# Patient Record
Sex: FEMALE | Race: WHITE | NOT HISPANIC OR LATINO | Employment: FULL TIME | URBAN - METROPOLITAN AREA
[De-identification: names, ages, dates, MRNs, and addresses within clinical notes are randomized per-mention and may not be internally consistent; named-entity substitution may affect disease eponyms.]

---

## 2017-09-07 RX ORDER — MEDROXYPROGESTERONE ACETATE 150 MG/ML
INJECTION, SUSPENSION INTRAMUSCULAR
Qty: 1 ML | Refills: 0 | OUTPATIENT
Start: 2017-09-07

## 2024-03-19 ENCOUNTER — HOSPITAL ENCOUNTER (EMERGENCY)
Facility: HOSPITAL | Age: 39
Discharge: HOME | End: 2024-03-19
Attending: STUDENT IN AN ORGANIZED HEALTH CARE EDUCATION/TRAINING PROGRAM | Admitting: STUDENT IN AN ORGANIZED HEALTH CARE EDUCATION/TRAINING PROGRAM
Payer: COMMERCIAL

## 2024-03-19 ENCOUNTER — APPOINTMENT (OUTPATIENT)
Dept: CT IMAGING | Facility: HOSPITAL | Age: 39
End: 2024-03-19
Payer: COMMERCIAL

## 2024-03-19 VITALS
TEMPERATURE: 97.9 F | SYSTOLIC BLOOD PRESSURE: 128 MMHG | OXYGEN SATURATION: 97 % | BODY MASS INDEX: 36.87 KG/M2 | HEART RATE: 72 BPM | DIASTOLIC BLOOD PRESSURE: 79 MMHG | RESPIRATION RATE: 18 BRPM | HEIGHT: 62 IN

## 2024-03-19 DIAGNOSIS — K29.20 ACUTE ALCOHOLIC GASTRITIS WITHOUT HEMORRHAGE: Primary | ICD-10-CM

## 2024-03-19 LAB
ALBUMIN SERPL-MCNC: 4.2 G/DL (ref 3.5–5.2)
ALBUMIN/GLOB SERPL: 1.8 G/DL
ALP SERPL-CCNC: 98 U/L (ref 39–117)
ALT SERPL W P-5'-P-CCNC: 24 U/L (ref 1–33)
ANION GAP SERPL CALCULATED.3IONS-SCNC: 11 MMOL/L (ref 5–15)
AST SERPL-CCNC: 14 U/L (ref 1–32)
BACTERIA UR QL AUTO: NORMAL /HPF
BASOPHILS # BLD AUTO: 0.02 10*3/MM3 (ref 0–0.2)
BASOPHILS NFR BLD AUTO: 0.3 % (ref 0–1.5)
BILIRUB SERPL-MCNC: 0.3 MG/DL (ref 0–1.2)
BILIRUB UR QL STRIP: NEGATIVE
BUN SERPL-MCNC: 9 MG/DL (ref 6–20)
BUN/CREAT SERPL: 10 (ref 7–25)
CALCIUM SPEC-SCNC: 9.2 MG/DL (ref 8.6–10.5)
CHLORIDE SERPL-SCNC: 106 MMOL/L (ref 98–107)
CLARITY UR: CLEAR
CO2 SERPL-SCNC: 21 MMOL/L (ref 22–29)
COLOR UR: YELLOW
CREAT SERPL-MCNC: 0.9 MG/DL (ref 0.57–1)
DEPRECATED RDW RBC AUTO: 41.3 FL (ref 37–54)
EGFRCR SERPLBLD CKD-EPI 2021: 82.5 ML/MIN/1.73
EOSINOPHIL # BLD AUTO: 0.1 10*3/MM3 (ref 0–0.4)
EOSINOPHIL NFR BLD AUTO: 1.6 % (ref 0.3–6.2)
ERYTHROCYTE [DISTWIDTH] IN BLOOD BY AUTOMATED COUNT: 11.6 % (ref 12.3–15.4)
GLOBULIN UR ELPH-MCNC: 2.3 GM/DL
GLUCOSE SERPL-MCNC: 120 MG/DL (ref 65–99)
GLUCOSE UR STRIP-MCNC: NEGATIVE MG/DL
HCG SERPL QL: NEGATIVE
HCT VFR BLD AUTO: 44.1 % (ref 34–46.6)
HGB BLD-MCNC: 14.5 G/DL (ref 12–15.9)
HGB UR QL STRIP.AUTO: NEGATIVE
HYALINE CASTS UR QL AUTO: NORMAL /LPF
IMM GRANULOCYTES # BLD AUTO: 0.01 10*3/MM3 (ref 0–0.05)
IMM GRANULOCYTES NFR BLD AUTO: 0.2 % (ref 0–0.5)
KETONES UR QL STRIP: NEGATIVE
LEUKOCYTE ESTERASE UR QL STRIP.AUTO: ABNORMAL
LIPASE SERPL-CCNC: 27 U/L (ref 13–60)
LYMPHOCYTES # BLD AUTO: 1.88 10*3/MM3 (ref 0.7–3.1)
LYMPHOCYTES NFR BLD AUTO: 30.2 % (ref 19.6–45.3)
MCH RBC QN AUTO: 31.8 PG (ref 26.6–33)
MCHC RBC AUTO-ENTMCNC: 32.9 G/DL (ref 31.5–35.7)
MCV RBC AUTO: 96.7 FL (ref 79–97)
MONOCYTES # BLD AUTO: 0.45 10*3/MM3 (ref 0.1–0.9)
MONOCYTES NFR BLD AUTO: 7.2 % (ref 5–12)
NEUTROPHILS NFR BLD AUTO: 3.76 10*3/MM3 (ref 1.7–7)
NEUTROPHILS NFR BLD AUTO: 60.5 % (ref 42.7–76)
NITRITE UR QL STRIP: NEGATIVE
NRBC BLD AUTO-RTO: 0 /100 WBC (ref 0–0.2)
PH UR STRIP.AUTO: 6.5 [PH] (ref 5–8)
PLATELET # BLD AUTO: 338 10*3/MM3 (ref 140–450)
PMV BLD AUTO: 9.2 FL (ref 6–12)
POTASSIUM SERPL-SCNC: 4.2 MMOL/L (ref 3.5–5.2)
PROT SERPL-MCNC: 6.5 G/DL (ref 6–8.5)
PROT UR QL STRIP: NEGATIVE
RBC # BLD AUTO: 4.56 10*6/MM3 (ref 3.77–5.28)
RBC # UR STRIP: NORMAL /HPF
REF LAB TEST METHOD: NORMAL
SODIUM SERPL-SCNC: 138 MMOL/L (ref 136–145)
SP GR UR STRIP: 1.02 (ref 1–1.03)
SQUAMOUS #/AREA URNS HPF: NORMAL /HPF
UROBILINOGEN UR QL STRIP: ABNORMAL
WBC # UR STRIP: NORMAL /HPF
WBC NRBC COR # BLD AUTO: 6.22 10*3/MM3 (ref 3.4–10.8)

## 2024-03-19 PROCEDURE — 25010000002 MORPHINE PER 10 MG: Performed by: STUDENT IN AN ORGANIZED HEALTH CARE EDUCATION/TRAINING PROGRAM

## 2024-03-19 PROCEDURE — 80053 COMPREHEN METABOLIC PANEL: CPT | Performed by: STUDENT IN AN ORGANIZED HEALTH CARE EDUCATION/TRAINING PROGRAM

## 2024-03-19 PROCEDURE — 96374 THER/PROPH/DIAG INJ IV PUSH: CPT

## 2024-03-19 PROCEDURE — 96375 TX/PRO/DX INJ NEW DRUG ADDON: CPT

## 2024-03-19 PROCEDURE — 85025 COMPLETE CBC W/AUTO DIFF WBC: CPT | Performed by: STUDENT IN AN ORGANIZED HEALTH CARE EDUCATION/TRAINING PROGRAM

## 2024-03-19 PROCEDURE — 74177 CT ABD & PELVIS W/CONTRAST: CPT

## 2024-03-19 PROCEDURE — 25010000002 ONDANSETRON PER 1 MG: Performed by: STUDENT IN AN ORGANIZED HEALTH CARE EDUCATION/TRAINING PROGRAM

## 2024-03-19 PROCEDURE — 81001 URINALYSIS AUTO W/SCOPE: CPT | Performed by: STUDENT IN AN ORGANIZED HEALTH CARE EDUCATION/TRAINING PROGRAM

## 2024-03-19 PROCEDURE — 99285 EMERGENCY DEPT VISIT HI MDM: CPT

## 2024-03-19 PROCEDURE — 25510000001 IOPAMIDOL 61 % SOLUTION: Performed by: STUDENT IN AN ORGANIZED HEALTH CARE EDUCATION/TRAINING PROGRAM

## 2024-03-19 PROCEDURE — 83690 ASSAY OF LIPASE: CPT | Performed by: STUDENT IN AN ORGANIZED HEALTH CARE EDUCATION/TRAINING PROGRAM

## 2024-03-19 PROCEDURE — 25810000003 SODIUM CHLORIDE 0.9 % SOLUTION: Performed by: STUDENT IN AN ORGANIZED HEALTH CARE EDUCATION/TRAINING PROGRAM

## 2024-03-19 PROCEDURE — 84703 CHORIONIC GONADOTROPIN ASSAY: CPT | Performed by: STUDENT IN AN ORGANIZED HEALTH CARE EDUCATION/TRAINING PROGRAM

## 2024-03-19 RX ORDER — ONDANSETRON 2 MG/ML
4 INJECTION INTRAMUSCULAR; INTRAVENOUS ONCE
Status: COMPLETED | OUTPATIENT
Start: 2024-03-19 | End: 2024-03-19

## 2024-03-19 RX ORDER — OMEPRAZOLE 20 MG/1
40 CAPSULE, DELAYED RELEASE ORAL DAILY
Qty: 60 CAPSULE | Refills: 0 | Status: SHIPPED | OUTPATIENT
Start: 2024-03-19 | End: 2024-04-18

## 2024-03-19 RX ORDER — MORPHINE SULFATE 2 MG/ML
4 INJECTION, SOLUTION INTRAMUSCULAR; INTRAVENOUS ONCE
Status: COMPLETED | OUTPATIENT
Start: 2024-03-19 | End: 2024-03-19

## 2024-03-19 RX ORDER — PANTOPRAZOLE SODIUM 40 MG/10ML
40 INJECTION, POWDER, LYOPHILIZED, FOR SOLUTION INTRAVENOUS ONCE
Status: COMPLETED | OUTPATIENT
Start: 2024-03-19 | End: 2024-03-19

## 2024-03-19 RX ORDER — SUCRALFATE 1 G/1
1 TABLET ORAL 4 TIMES DAILY
Qty: 120 TABLET | Refills: 0 | Status: SHIPPED | OUTPATIENT
Start: 2024-03-19 | End: 2024-04-18

## 2024-03-19 RX ORDER — ALUMINA, MAGNESIA, AND SIMETHICONE 2400; 2400; 240 MG/30ML; MG/30ML; MG/30ML
15 SUSPENSION ORAL ONCE
Status: COMPLETED | OUTPATIENT
Start: 2024-03-19 | End: 2024-03-19

## 2024-03-19 RX ADMIN — PANTOPRAZOLE SODIUM 40 MG: 40 INJECTION, POWDER, FOR SOLUTION INTRAVENOUS at 12:16

## 2024-03-19 RX ADMIN — IOPAMIDOL 85 ML: 612 INJECTION, SOLUTION INTRAVENOUS at 11:03

## 2024-03-19 RX ADMIN — MORPHINE SULFATE 4 MG: 2 INJECTION, SOLUTION INTRAMUSCULAR; INTRAVENOUS at 10:20

## 2024-03-19 RX ADMIN — SODIUM CHLORIDE 1000 ML: 9 INJECTION, SOLUTION INTRAVENOUS at 10:19

## 2024-03-19 RX ADMIN — ALUMINUM HYDROXIDE, MAGNESIUM HYDROXIDE, AND DIMETHICONE 15 ML: 400; 400; 40 SUSPENSION ORAL at 12:16

## 2024-03-19 RX ADMIN — ONDANSETRON 4 MG: 2 INJECTION INTRAMUSCULAR; INTRAVENOUS at 10:20

## 2024-03-19 NOTE — DISCHARGE INSTRUCTIONS
Please take all medication as prescribed.  Please decrease your alcohol use    You may get a bottle of Maalox over-the-counter and follow directions on the packaging to help relieve your symptoms as well    Please return to the ER with new or worsening symptoms including but not limited to uncontrolled pain, uncontrolled nausea or vomiting, fevers, chills    Please adhere to a bland diet over the next 1 to 2 weeks    Follow-up with your primary care physician within 1 week for repeat evaluation    I have placed a referral order to [facility] neurology, they will contact you to schedule an outpatient appointment

## 2024-03-19 NOTE — ED PROVIDER NOTES
EMERGENCY DEPARTMENT ENCOUNTER  Room Number:  38/38  PCP: Sonja Broussard MD  Independent Historians: Patient      HPI:  Chief Complaint: had concerns including Abdominal Pain and Back Pain.     Context: Magdalena Carreon is a 39 y.o. female with a medical history of cholecystectomy, alcohol use, seizures who presents to the ED c/o acute abdominal pain.  Patient states over the last 24 hours she has been having significant epigastric pain and associated nausea.  Patient denies vomiting, changes in bowel habits.  Patient states she is a daily alcohol user and has upwards of 3 alcoholic drinks per day.  Patient states her pain is located in the epigastrium and radiates to her back.  Patient does have history of previous cholecystectomy.      Review of prior external notes (non-ED) -and- Review of prior external test results outside of this encounter: Extensive review of the EPIC system as well as [facility] reveals no prior visit notes and no prior diagnostic studies available for review.    PAST MEDICAL HISTORY  Active Ambulatory Problems     Diagnosis Date Noted    No Active Ambulatory Problems     Resolved Ambulatory Problems     Diagnosis Date Noted    No Resolved Ambulatory Problems     Past Medical History:   Diagnosis Date    Abnormal Pap smear of cervix     Migraine     Seizure disorder          PAST SURGICAL HISTORY  Past Surgical History:   Procedure Laterality Date    CEREBRAL ANEURYSM REPAIR           FAMILY HISTORY  Family History   Problem Relation Age of Onset    Melanoma Father     Diabetes Father     Pancreatic cancer Paternal Grandmother     Diabetes Paternal Grandmother     Diabetes Maternal Grandmother          SOCIAL HISTORY  Social History     Socioeconomic History    Marital status: Single   Tobacco Use    Smoking status: Light Smoker   Substance and Sexual Activity    Drug use: Yes     Types: Marijuana    Sexual activity: Yes         ALLERGIES  Prednisone      REVIEW OF SYSTEMS  Review of  Systems  Included in HPI  All systems reviewed and negative except for those discussed in HPI.      PHYSICAL EXAM    I have reviewed the triage vital signs and nursing notes.    ED Triage Vitals [03/19/24 0912]   Temp Heart Rate Resp BP SpO2   97.9 °F (36.6 °C) 118 18 -- 99 %      Temp src Heart Rate Source Patient Position BP Location FiO2 (%)   Tympanic Monitor -- -- --       Physical Exam  GENERAL: alert, no acute distress  SKIN: Warm, dry  HENT: Normocephalic, atraumatic  EYES: no scleral icterus  CV: regular rhythm, regular rate  RESPIRATORY: normal effort, lungs clear  ABDOMEN: soft, tenderness to palpation in the epigastrium, soft, nondistended  MUSCULOSKELETAL: no deformity  NEURO: alert, moves all extremities, follows commands            LAB RESULTS  Recent Results (from the past 24 hour(s))   Comprehensive Metabolic Panel    Collection Time: 03/19/24  9:24 AM    Specimen: Blood   Result Value Ref Range    Glucose 120 (H) 65 - 99 mg/dL    BUN 9 6 - 20 mg/dL    Creatinine 0.90 0.57 - 1.00 mg/dL    Sodium 138 136 - 145 mmol/L    Potassium 4.2 3.5 - 5.2 mmol/L    Chloride 106 98 - 107 mmol/L    CO2 21.0 (L) 22.0 - 29.0 mmol/L    Calcium 9.2 8.6 - 10.5 mg/dL    Total Protein 6.5 6.0 - 8.5 g/dL    Albumin 4.2 3.5 - 5.2 g/dL    ALT (SGPT) 24 1 - 33 U/L    AST (SGOT) 14 1 - 32 U/L    Alkaline Phosphatase 98 39 - 117 U/L    Total Bilirubin 0.3 0.0 - 1.2 mg/dL    Globulin 2.3 gm/dL    A/G Ratio 1.8 g/dL    BUN/Creatinine Ratio 10.0 7.0 - 25.0    Anion Gap 11.0 5.0 - 15.0 mmol/L    eGFR 82.5 >60.0 mL/min/1.73   Lipase    Collection Time: 03/19/24  9:24 AM    Specimen: Blood   Result Value Ref Range    Lipase 27 13 - 60 U/L   hCG, Serum, Qualitative    Collection Time: 03/19/24  9:24 AM    Specimen: Blood   Result Value Ref Range    HCG Qualitative Negative Negative   CBC Auto Differential    Collection Time: 03/19/24  9:24 AM    Specimen: Blood   Result Value Ref Range    WBC 6.22 3.40 - 10.80 10*3/mm3    RBC 4.56  3.77 - 5.28 10*6/mm3    Hemoglobin 14.5 12.0 - 15.9 g/dL    Hematocrit 44.1 34.0 - 46.6 %    MCV 96.7 79.0 - 97.0 fL    MCH 31.8 26.6 - 33.0 pg    MCHC 32.9 31.5 - 35.7 g/dL    RDW 11.6 (L) 12.3 - 15.4 %    RDW-SD 41.3 37.0 - 54.0 fl    MPV 9.2 6.0 - 12.0 fL    Platelets 338 140 - 450 10*3/mm3    Neutrophil % 60.5 42.7 - 76.0 %    Lymphocyte % 30.2 19.6 - 45.3 %    Monocyte % 7.2 5.0 - 12.0 %    Eosinophil % 1.6 0.3 - 6.2 %    Basophil % 0.3 0.0 - 1.5 %    Immature Grans % 0.2 0.0 - 0.5 %    Neutrophils, Absolute 3.76 1.70 - 7.00 10*3/mm3    Lymphocytes, Absolute 1.88 0.70 - 3.10 10*3/mm3    Monocytes, Absolute 0.45 0.10 - 0.90 10*3/mm3    Eosinophils, Absolute 0.10 0.00 - 0.40 10*3/mm3    Basophils, Absolute 0.02 0.00 - 0.20 10*3/mm3    Immature Grans, Absolute 0.01 0.00 - 0.05 10*3/mm3    nRBC 0.0 0.0 - 0.2 /100 WBC   Urinalysis With Microscopic If Indicated (No Culture) - Urine, Clean Catch    Collection Time: 03/19/24 10:22 AM    Specimen: Urine, Clean Catch   Result Value Ref Range    Color, UA Yellow Yellow, Straw    Appearance, UA Clear Clear    pH, UA 6.5 5.0 - 8.0    Specific Gravity, UA 1.020 1.005 - 1.030    Glucose, UA Negative Negative    Ketones, UA Negative Negative    Bilirubin, UA Negative Negative    Blood, UA Negative Negative    Protein, UA Negative Negative    Leuk Esterase, UA Small (1+) (A) Negative    Nitrite, UA Negative Negative    Urobilinogen, UA 0.2 E.U./dL 0.2 - 1.0 E.U./dL   Urinalysis, Microscopic Only - Urine, Clean Catch    Collection Time: 03/19/24 10:22 AM    Specimen: Urine, Clean Catch   Result Value Ref Range    RBC, UA 0-2 None Seen, 0-2 /HPF    WBC, UA 0-2 None Seen, 0-2 /HPF    Bacteria, UA None Seen None Seen /HPF    Squamous Epithelial Cells, UA 0-2 None Seen, 0-2 /HPF    Hyaline Casts, UA None Seen None Seen /LPF    Methodology Automated Microscopy          RADIOLOGY  CT Abdomen Pelvis With Contrast    Result Date: 3/19/2024  CT ABDOMEN AND PELVIS WITH IV CONTRAST   HISTORY: 39-year-old female with left upper quadrant pain for 2 days.  TECHNIQUE: Radiation dose reduction techniques were utilized, including automated exposure control and exposure modulation based on body size. 3 mm images were obtained through the abdomen and pelvis after the administration of IV contrast. No priors for comparison.  FINDINGS: 1. Noncontrasted liver appears unremarkable and there is no biliary dilatation. Splenic size is normal. The pancreas appears unremarkable. There is nodular hyperplasia of both adrenal glands. Kidneys appear unremarkable other than a small right renal cyst.  2. There is a paucity of formed stool within the colon, but there is no colonic thickening or convincing evidence for colitis. There is no significant colonic diverticulosis. Appendix is within normal limits.  3. Uterus and adnexa appear unremarkable. There is a tubal ligation clip on the right, but there is not one on the left. There is a similar-appearing clip just deep to the right rectus muscle belly, image 121. There is no free fluid or lymphadenopathy.  4. There is no evidence for pneumonia or pleural effusions within the visualized lower chest.         MEDICATIONS GIVEN IN ER  Medications   ondansetron (ZOFRAN) injection 4 mg (4 mg Intravenous Given 3/19/24 1020)   morphine injection 4 mg (4 mg Intravenous Given 3/19/24 1020)   sodium chloride 0.9 % bolus 1,000 mL (1,000 mL Intravenous New Bag 3/19/24 1019)   iopamidol (ISOVUE-300) 61 % injection 100 mL (85 mL Intravenous Given 3/19/24 1103)   aluminum-magnesium hydroxide-simethicone (MAALOX MAX) 400-400-40 MG/5ML suspension 15 mL (15 mL Oral Given 3/19/24 1216)   pantoprazole (PROTONIX) injection 40 mg (40 mg Intravenous Given 3/19/24 1216)         ORDERS PLACED DURING THIS VISIT:  Orders Placed This Encounter   Procedures    CT Abdomen Pelvis With Contrast    Comprehensive Metabolic Panel    Lipase    hCG, Serum, Qualitative    Urinalysis With Microscopic If  Indicated (No Culture) - Urine, Clean Catch    CBC Auto Differential    Urinalysis, Microscopic Only - Urine, Clean Catch    Ambulatory Referral to Gastroenterology    CBC & Differential         OUTPATIENT MEDICATION MANAGEMENT:  No current Epic-ordered facility-administered medications on file.     Current Outpatient Medications Ordered in Epic   Medication Sig Dispense Refill    gabapentin (NEURONTIN) 100 MG capsule Take 100 mg by mouth Daily.      levETIRAcetam (KEPPRA) 1000 MG tablet   0    medroxyPROGESTERone (DEPO-PROVERA) 150 MG/ML injection Inject 1 mL into the shoulder, thigh, or buttocks every 3 (three) months. 1 mL 3    omeprazole (priLOSEC) 20 MG capsule Take 2 capsules by mouth Daily for 30 days. 60 capsule 0    sucralfate (CARAFATE) 1 g tablet Take 1 tablet by mouth 4 (Four) Times a Day for 30 days. 120 tablet 0       PROGRESS, DATA ANALYSIS, CONSULTS, AND MEDICAL DECISION MAKING  All labs have been independently interpreted by me.  All radiology studies have been reviewed by me. All EKG's have been independently viewed and interpreted by me.  Discussion below represents my analysis of pertinent findings related to patient's condition, differential diagnosis, treatment plan and final disposition.    Differential diagnosis includes but is not limited to gastritis, pancreatitis, gastroenteritis.    Clinical Scores:                   ED Course as of 03/19/24 1301   Tue Mar 19, 2024   1200 CT abdomen interpreted by me and demonstrates no evidence of free air or SBO [MW]   1259 Laboratory evaluation is overall unremarkable.  Radiological evaluation demonstrates no acute findings.  I suspect patient is suffering from gastritis secondary to alcohol use.  Administered Maalox and pantoprazole in the emergency department.  Will start patient on a course of omeprazole, Carafate and counseled on use of Maalox.  Placed ambulatory referral to [facility] neurology.  Counseled patient to follow closely with primary care.   Return precautions discussed and patient discharged in stable condition. [MW]      ED Course User Index  [MW] Abiodun Moreno MD             AS OF 13:01 EDT VITALS:    BP - 128/79  HR - 72  TEMP - 97.9 °F (36.6 °C) (Tympanic)  O2 SATS - 97%    COMPLEXITY OF CARE  Admission was considered but after careful review of the patient's presentation, physical examination, diagnostic results, and response to treatment the patient may be safely discharged with outpatient follow-up.      DIAGNOSIS  Final diagnoses:   Acute alcoholic gastritis without hemorrhage         DISPOSITION  ED Disposition       ED Disposition   Discharge    Condition   Stable    Comment   --                Please note that portions of this document were completed with a voice recognition program.    Note Disclaimer: At [facility], we believe that sharing information builds trust and better relationships. You are receiving this note because you recently visited [facility]. It is possible you will see health information before a provider has talked with you about it. This kind of information can be easy to misunderstand. To help you fully understand what it means for your health, we urge you to discuss this note with your provider.         Abiodun Moreno MD  03/19/24 1740

## 2024-03-19 NOTE — ED TRIAGE NOTES
Pt to ed from home via PV    T c/o RUQ pain radiating to back x1 days. Pt states she has had her gallbladder removed. Pt also reports diarrhea

## 2024-05-07 ENCOUNTER — HOSPITAL ENCOUNTER (EMERGENCY)
Facility: HOSPITAL | Age: 39
Discharge: HOME | End: 2024-05-07
Attending: EMERGENCY MEDICINE
Payer: COMMERCIAL

## 2024-05-07 VITALS
WEIGHT: 260 LBS | OXYGEN SATURATION: 98 % | HEART RATE: 72 BPM | DIASTOLIC BLOOD PRESSURE: 69 MMHG | HEIGHT: 62 IN | RESPIRATION RATE: 16 BRPM | BODY MASS INDEX: 47.84 KG/M2 | TEMPERATURE: 99 F | SYSTOLIC BLOOD PRESSURE: 107 MMHG

## 2024-05-07 DIAGNOSIS — G43.809 OTHER MIGRAINE WITHOUT STATUS MIGRAINOSUS, NOT INTRACTABLE: Primary | ICD-10-CM

## 2024-05-07 LAB
ANION GAP SERPL CALCULATED.3IONS-SCNC: 7.8 MMOL/L (ref 5–15)
BASOPHILS # BLD AUTO: 0.02 10*3/MM3 (ref 0–0.2)
BASOPHILS NFR BLD AUTO: 0.3 % (ref 0–1.5)
BUN SERPL-MCNC: 12 MG/DL (ref 6–20)
BUN/CREAT SERPL: 15 (ref 7–25)
CALCIUM SPEC-SCNC: 9.6 MG/DL (ref 8.6–10.5)
CHLORIDE SERPL-SCNC: 106 MMOL/L (ref 98–107)
CO2 SERPL-SCNC: 24.2 MMOL/L (ref 22–29)
CREAT SERPL-MCNC: 0.8 MG/DL (ref 0.57–1)
DEPRECATED RDW RBC AUTO: 47.4 FL (ref 37–54)
EGFRCR SERPLBLD CKD-EPI 2021: 95.1 ML/MIN/1.73
EOSINOPHIL # BLD AUTO: 0.13 10*3/MM3 (ref 0–0.4)
EOSINOPHIL NFR BLD AUTO: 1.6 % (ref 0.3–6.2)
ERYTHROCYTE [DISTWIDTH] IN BLOOD BY AUTOMATED COUNT: 12.6 % (ref 12.3–15.4)
GLUCOSE SERPL-MCNC: 96 MG/DL (ref 65–99)
HCT VFR BLD AUTO: 41.6 % (ref 34–46.6)
HGB BLD-MCNC: 13.5 G/DL (ref 12–15.9)
IMM GRANULOCYTES # BLD AUTO: 0.01 10*3/MM3 (ref 0–0.05)
IMM GRANULOCYTES NFR BLD AUTO: 0.1 % (ref 0–0.5)
LYMPHOCYTES # BLD AUTO: 2.64 10*3/MM3 (ref 0.7–3.1)
LYMPHOCYTES NFR BLD AUTO: 33.2 % (ref 19.6–45.3)
MCH RBC QN AUTO: 33 PG (ref 26.6–33)
MCHC RBC AUTO-ENTMCNC: 32.5 G/DL (ref 31.5–35.7)
MCV RBC AUTO: 101.7 FL (ref 79–97)
MONOCYTES # BLD AUTO: 0.52 10*3/MM3 (ref 0.1–0.9)
MONOCYTES NFR BLD AUTO: 6.5 % (ref 5–12)
NEUTROPHILS NFR BLD AUTO: 4.63 10*3/MM3 (ref 1.7–7)
NEUTROPHILS NFR BLD AUTO: 58.3 % (ref 42.7–76)
PLATELET # BLD AUTO: 331 10*3/MM3 (ref 140–450)
PMV BLD AUTO: 9.5 FL (ref 6–12)
POTASSIUM SERPL-SCNC: 4 MMOL/L (ref 3.5–5.2)
RBC # BLD AUTO: 4.09 10*6/MM3 (ref 3.77–5.28)
SODIUM SERPL-SCNC: 138 MMOL/L (ref 136–145)
WBC NRBC COR # BLD AUTO: 7.95 10*3/MM3 (ref 3.4–10.8)

## 2024-05-07 PROCEDURE — 25010000002 METOCLOPRAMIDE PER 10 MG

## 2024-05-07 PROCEDURE — 99283 EMERGENCY DEPT VISIT LOW MDM: CPT

## 2024-05-07 PROCEDURE — 85025 COMPLETE CBC W/AUTO DIFF WBC: CPT

## 2024-05-07 PROCEDURE — 99284 EMERGENCY DEPT VISIT MOD MDM: CPT

## 2024-05-07 PROCEDURE — 36415 COLL VENOUS BLD VENIPUNCTURE: CPT

## 2024-05-07 PROCEDURE — 96374 THER/PROPH/DIAG INJ IV PUSH: CPT

## 2024-05-07 PROCEDURE — 25010000002 KETOROLAC TROMETHAMINE PER 15 MG

## 2024-05-07 PROCEDURE — 96375 TX/PRO/DX INJ NEW DRUG ADDON: CPT

## 2024-05-07 PROCEDURE — 80048 BASIC METABOLIC PNL TOTAL CA: CPT

## 2024-05-07 RX ORDER — METOCLOPRAMIDE HYDROCHLORIDE 5 MG/ML
20 INJECTION INTRAMUSCULAR; INTRAVENOUS ONCE
Status: DISCONTINUED | OUTPATIENT
Start: 2024-05-07 | End: 2024-05-07

## 2024-05-07 RX ORDER — ONDANSETRON 4 MG/1
8 TABLET, ORALLY DISINTEGRATING ORAL EVERY 8 HOURS PRN
Qty: 12 TABLET | Refills: 0 | Status: SHIPPED | OUTPATIENT
Start: 2024-05-07

## 2024-05-07 RX ORDER — ACETAMINOPHEN 500 MG
1000 TABLET ORAL ONCE
Status: COMPLETED | OUTPATIENT
Start: 2024-05-07 | End: 2024-05-07

## 2024-05-07 RX ORDER — KETOROLAC TROMETHAMINE 30 MG/ML
30 INJECTION, SOLUTION INTRAMUSCULAR; INTRAVENOUS ONCE
Status: COMPLETED | OUTPATIENT
Start: 2024-05-07 | End: 2024-05-07

## 2024-05-07 RX ORDER — METOCLOPRAMIDE HYDROCHLORIDE 5 MG/ML
10 INJECTION INTRAMUSCULAR; INTRAVENOUS ONCE
Status: COMPLETED | OUTPATIENT
Start: 2024-05-07 | End: 2024-05-07

## 2024-05-07 RX ORDER — PROMETHAZINE HYDROCHLORIDE 25 MG/1
25 TABLET ORAL EVERY 6 HOURS PRN
Qty: 12 TABLET | Refills: 0 | Status: SHIPPED | OUTPATIENT
Start: 2024-05-07

## 2024-05-07 RX ADMIN — ACETAMINOPHEN 1000 MG: 500 TABLET ORAL at 16:24

## 2024-05-07 RX ADMIN — KETOROLAC TROMETHAMINE 30 MG: 30 INJECTION, SOLUTION INTRAMUSCULAR at 15:38

## 2024-05-07 RX ADMIN — METOCLOPRAMIDE 10 MG: 5 INJECTION, SOLUTION INTRAMUSCULAR; INTRAVENOUS at 15:39

## 2024-05-07 NOTE — DISCHARGE INSTRUCTIONS
Alternate ibuprofen 800 mg and acetaminophen 1000 mg every 4 hours.  Increase electrolyte fluids and small meals.  Use Zofran and Phenergan as prescribed as needed for nausea vomiting.  Recommend follow-up with PCP and/or neurology.  Return to emergency department for worsening symptoms or other medical emergencies.  Refer to the attached instructions for further information.

## 2024-05-07 NOTE — Clinical Note
[facility] [place]  [address]  [place] [address]    Magdalena Carreon was seen and treated in our emergency department on 5/7/2024.  She may return to work on 05/09/2024.         Thank you for choosing [facility].    Gali Leiva PA-C

## 2024-05-07 NOTE — FSED PROVIDER NOTE
Subjective   History of Present Illness  Patient is a 39-year-old female who presents to the emergency department with a headache that onset yesterday when she woke up.  Primarily right-sided behind her eye.  Patient reports 1 episode of vomiting.  Describes some lightheadedness that comes and goes.  Tylenol and ibuprofen have been providing some relief.  Has had some allergy symptoms over the past week, and is taking Allegra.  Denies confusion, visual changes, vertigo, dizziness, speech changes, weakness, numbness, fever.  Patient has long history of migraines.  Does not currently take any preventative or abortive medications.          Review of Systems   Constitutional:  Negative for appetite change, chills, diaphoresis, fatigue and fever.   HENT:  Positive for rhinorrhea and sneezing.    Eyes:  Negative for pain, redness and itching.   Respiratory:  Negative for cough and shortness of breath.    Gastrointestinal:  Positive for nausea and vomiting. Negative for abdominal pain and diarrhea.   Musculoskeletal:  Negative for arthralgias, back pain, myalgias, neck pain and neck stiffness.   Skin:  Negative for color change, pallor, rash and wound.   Neurological:  Positive for light-headedness and headaches. Negative for dizziness, seizures, syncope, facial asymmetry, speech difficulty, weakness and numbness.       Past Medical History:   Diagnosis Date    Abnormal Pap smear of cervix     Migraine     Seizure disorder        Allergies   Allergen Reactions    Prednisone Hives       Past Surgical History:   Procedure Laterality Date    CEREBRAL ANEURYSM REPAIR         Family History   Problem Relation Age of Onset    Melanoma Father     Diabetes Father     Pancreatic cancer Paternal Grandmother     Diabetes Paternal Grandmother     Diabetes Maternal Grandmother        Social History     Socioeconomic History    Marital status: Single   Tobacco Use    Smoking status: Light Smoker   Substance and Sexual Activity    Drug  use: Yes     Types: Marijuana    Sexual activity: Yes           Objective   Physical Exam  Constitutional:       General: She is not in acute distress.     Appearance: She is not ill-appearing, toxic-appearing or diaphoretic.   HENT:      Head: Normocephalic and atraumatic.      Comments: No temporal tenderness.  Eyes:      Extraocular Movements: Extraocular movements intact.      Conjunctiva/sclera: Conjunctivae normal.      Pupils: Pupils are equal, round, and reactive to light.   Musculoskeletal:         General: Normal range of motion.      Cervical back: Normal range of motion. No rigidity.   Skin:     General: Skin is warm and dry.   Neurological:      General: No focal deficit present.      Mental Status: She is alert and oriented to person, place, and time.      Cranial Nerves: No cranial nerve deficit.      Sensory: No sensory deficit.      Motor: No weakness.      Coordination: Coordination normal.      Gait: Gait normal.   Psychiatric:         Mood and Affect: Mood normal.         Behavior: Behavior normal.         Procedures           ED Course  ED Course as of 05/07/24 1633   Tue May 07, 2024   1557 CBC and BMP unremarkable.  [AS]   1623 Patient reports headache significantly improved from a 7/10 to a 3/10 in severity after Reglan and Toradol.  Will give Tylenol prior to discharge.  Patient has not taken any medications today prior to arrival. [AS]      ED Course User Index  [AS] Gali Leiva, PAAngelaC                                           Medical Decision Making  Patient is a 39-year-old female who presents with headache that onset yesterday.  There is associated nausea vomiting.  Does improve with Tylenol and ibuprofen, but will go away.  Patient has a history of migraines, and this is consistent.  No concerning neurological signs or symptoms.  No temporal tenderness.  No neck rigidity.  Patient overall appears well, no acute distress, nontoxic.  Vital signs are WNL.  Does improve with migraine  cocktail.  Discussed when to return to the emergency department.  Answered all questions.  Patient verbalized understanding and was agreeable to plan and discharge.    My differential diagnosis for headache includes but is not limited to:  Migraine, cluster, ischemic stroke, subarachnoid hemorrhage, intracranial hemorrhage, vascular malformation, cerebral aneurysm, vascular dissection, vasculitis, temporal arteritis, malignant hypertension, pheochromocytoma, cerebral venous thrombosis, preeclampsia; bacterial meningitis, viral meningitis, fungal meningitis, encephalitis, brain abscess, pleural empyema, sinusitis, dental infection, influenza, viral syndrome; carbon monoxide exposure, analgesic abuse, hypoglycemia; trigeminal neuralgia, postherpetic neuralgia, occipital neuralgia; subdural hematoma, concussion, musculoskeletal tension, cervical osteoarthritis; glaucoma, TMJ disease, pseudotumor cerebri, post LP headache, intracranial neoplasm, sleep apnea     Amount and/or Complexity of Data Reviewed  Labs: ordered.    Risk  Prescription drug management.        Final diagnoses:   Other migraine without status migrainosus, not intractable       ED Disposition  ED Disposition       ED Disposition   Discharge    Condition   Stable    Comment   --               Sonja Broussard MD  [address] [facility] [address] [phone]    Schedule an appointment as soon as possible for a visit            Medication List        New Prescriptions      ondansetron ODT 4 MG disintegrating tablet  Commonly known as: ZOFRAN-ODT  Place 2 tablets on the tongue Every 8 (Eight) Hours As Needed for Nausea or Vomiting.     promethazine 25 MG tablet  Commonly known as: PHENERGAN  Take 1 tablet by mouth Every 6 (Six) Hours As Needed for Nausea or Vomiting. May cause drowsiness.               Where to Get Your Medications        These medications were sent to [id number] - [place], [place] - [address] - [phone]  - [phone] FX  [address] [address]      Phone: [phone]   ondansetron ODT 4 MG disintegrating tablet  promethazine 25 MG tablet

## 2024-06-04 ENCOUNTER — APPOINTMENT (OUTPATIENT)
Dept: GENERAL RADIOLOGY | Facility: HOSPITAL | Age: 39
End: 2024-06-04
Payer: COMMERCIAL

## 2024-06-04 ENCOUNTER — HOSPITAL ENCOUNTER (OUTPATIENT)
Facility: HOSPITAL | Age: 39
Setting detail: OBSERVATION
Discharge: HOME | End: 2024-06-07
Attending: EMERGENCY MEDICINE | Admitting: HOSPITALIST
Payer: COMMERCIAL

## 2024-06-04 ENCOUNTER — APPOINTMENT (OUTPATIENT)
Dept: CT IMAGING | Facility: HOSPITAL | Age: 39
End: 2024-06-04
Payer: COMMERCIAL

## 2024-06-04 DIAGNOSIS — E87.21 ACUTE LACTIC ACIDOSIS: ICD-10-CM

## 2024-06-04 DIAGNOSIS — L03.818 CELLULITIS OF OTHER SPECIFIED SITE: ICD-10-CM

## 2024-06-04 DIAGNOSIS — A41.9 SEPSIS WITHOUT ACUTE ORGAN DYSFUNCTION, DUE TO UNSPECIFIED ORGANISM: Primary | ICD-10-CM

## 2024-06-04 LAB
ALBUMIN SERPL-MCNC: 4.1 G/DL (ref 3.5–5.2)
ALBUMIN/GLOB SERPL: 1.4 G/DL
ALP SERPL-CCNC: 102 U/L (ref 39–117)
ALT SERPL W P-5'-P-CCNC: 29 U/L (ref 1–33)
ANION GAP SERPL CALCULATED.3IONS-SCNC: 13.6 MMOL/L (ref 5–15)
AST SERPL-CCNC: 24 U/L (ref 1–32)
BASOPHILS # BLD AUTO: 0 10*3/MM3 (ref 0–0.2)
BASOPHILS NFR BLD AUTO: 0 % (ref 0–1.5)
BILIRUB SERPL-MCNC: <0.2 MG/DL (ref 0–1.2)
BUN SERPL-MCNC: 8 MG/DL (ref 6–20)
BUN/CREAT SERPL: 11.3 (ref 7–25)
CALCIUM SPEC-SCNC: 9.2 MG/DL (ref 8.6–10.5)
CHLORIDE SERPL-SCNC: 107 MMOL/L (ref 98–107)
CO2 SERPL-SCNC: 20.4 MMOL/L (ref 22–29)
CREAT SERPL-MCNC: 0.71 MG/DL (ref 0.57–1)
D-LACTATE SERPL-SCNC: 3.7 MMOL/L (ref 0.5–2)
DEPRECATED RDW RBC AUTO: 41.7 FL (ref 37–54)
EGFRCR SERPLBLD CKD-EPI 2021: 109.7 ML/MIN/1.73
EOSINOPHIL # BLD AUTO: 0 10*3/MM3 (ref 0–0.4)
EOSINOPHIL NFR BLD AUTO: 0 % (ref 0.3–6.2)
ERYTHROCYTE [DISTWIDTH] IN BLOOD BY AUTOMATED COUNT: 11.6 % (ref 12.3–15.4)
ETHANOL BLD-MCNC: <10 MG/DL (ref 0–10)
ETHANOL UR QL: <0.01 %
GLOBULIN UR ELPH-MCNC: 2.9 GM/DL
GLUCOSE SERPL-MCNC: 107 MG/DL (ref 65–99)
HCG SERPL QL: NEGATIVE
HCT VFR BLD AUTO: 31.6 % (ref 34–46.6)
HGB BLD-MCNC: 10.6 G/DL (ref 12–15.9)
HOLD SPECIMEN: NORMAL
IMM GRANULOCYTES # BLD AUTO: 0.02 10*3/MM3 (ref 0–0.05)
IMM GRANULOCYTES NFR BLD AUTO: 0.3 % (ref 0–0.5)
LIPASE SERPL-CCNC: 41 U/L (ref 13–60)
LYMPHOCYTES # BLD AUTO: 0.25 10*3/MM3 (ref 0.7–3.1)
LYMPHOCYTES NFR BLD AUTO: 4.3 % (ref 19.6–45.3)
MAGNESIUM SERPL-MCNC: 1.9 MG/DL (ref 1.6–2.6)
MCH RBC QN AUTO: 32.8 PG (ref 26.6–33)
MCHC RBC AUTO-ENTMCNC: 33.5 G/DL (ref 31.5–35.7)
MCV RBC AUTO: 97.8 FL (ref 79–97)
MONOCYTES # BLD AUTO: 0.07 10*3/MM3 (ref 0.1–0.9)
MONOCYTES NFR BLD AUTO: 1.2 % (ref 5–12)
NEUTROPHILS NFR BLD AUTO: 5.46 10*3/MM3 (ref 1.7–7)
NEUTROPHILS NFR BLD AUTO: 94.2 % (ref 42.7–76)
NRBC BLD AUTO-RTO: 0 /100 WBC (ref 0–0.2)
PLATELET # BLD AUTO: 243 10*3/MM3 (ref 140–450)
PMV BLD AUTO: 9.4 FL (ref 6–12)
POTASSIUM SERPL-SCNC: 3.9 MMOL/L (ref 3.5–5.2)
PROT SERPL-MCNC: 7 G/DL (ref 6–8.5)
RBC # BLD AUTO: 3.23 10*6/MM3 (ref 3.77–5.28)
SODIUM SERPL-SCNC: 141 MMOL/L (ref 136–145)
WBC NRBC COR # BLD AUTO: 5.8 10*3/MM3 (ref 3.4–10.8)
WHOLE BLOOD HOLD COAG: NORMAL
WHOLE BLOOD HOLD SPECIMEN: NORMAL

## 2024-06-04 PROCEDURE — 74177 CT ABD & PELVIS W/CONTRAST: CPT

## 2024-06-04 PROCEDURE — 87637 SARSCOV2&INF A&B&RSV AMP PRB: CPT | Performed by: PHYSICIAN ASSISTANT

## 2024-06-04 PROCEDURE — 83690 ASSAY OF LIPASE: CPT

## 2024-06-04 PROCEDURE — 25510000001 IOPAMIDOL 61 % SOLUTION: Performed by: EMERGENCY MEDICINE

## 2024-06-04 PROCEDURE — 25010000002 ONDANSETRON PER 1 MG: Performed by: EMERGENCY MEDICINE

## 2024-06-04 PROCEDURE — 83735 ASSAY OF MAGNESIUM: CPT | Performed by: EMERGENCY MEDICINE

## 2024-06-04 PROCEDURE — 84703 CHORIONIC GONADOTROPIN ASSAY: CPT | Performed by: EMERGENCY MEDICINE

## 2024-06-04 PROCEDURE — 83605 ASSAY OF LACTIC ACID: CPT | Performed by: EMERGENCY MEDICINE

## 2024-06-04 PROCEDURE — 96375 TX/PRO/DX INJ NEW DRUG ADDON: CPT

## 2024-06-04 PROCEDURE — 99291 CRITICAL CARE FIRST HOUR: CPT

## 2024-06-04 PROCEDURE — 71045 X-RAY EXAM CHEST 1 VIEW: CPT

## 2024-06-04 PROCEDURE — 85025 COMPLETE CBC W/AUTO DIFF WBC: CPT

## 2024-06-04 PROCEDURE — 36415 COLL VENOUS BLD VENIPUNCTURE: CPT

## 2024-06-04 PROCEDURE — 25810000003 LACTATED RINGERS SOLUTION: Performed by: EMERGENCY MEDICINE

## 2024-06-04 PROCEDURE — 80053 COMPREHEN METABOLIC PANEL: CPT

## 2024-06-04 PROCEDURE — 87040 BLOOD CULTURE FOR BACTERIA: CPT | Performed by: EMERGENCY MEDICINE

## 2024-06-04 PROCEDURE — 84145 PROCALCITONIN (PCT): CPT | Performed by: PHYSICIAN ASSISTANT

## 2024-06-04 PROCEDURE — 82077 ASSAY SPEC XCP UR&BREATH IA: CPT | Performed by: EMERGENCY MEDICINE

## 2024-06-04 RX ORDER — MORPHINE SULFATE 2 MG/ML
4 INJECTION, SOLUTION INTRAMUSCULAR; INTRAVENOUS ONCE
Status: COMPLETED | OUTPATIENT
Start: 2024-06-05 | End: 2024-06-05

## 2024-06-04 RX ORDER — ACETAMINOPHEN 500 MG
1000 TABLET ORAL ONCE
Status: COMPLETED | OUTPATIENT
Start: 2024-06-04 | End: 2024-06-04

## 2024-06-04 RX ORDER — ONDANSETRON 2 MG/ML
4 INJECTION INTRAMUSCULAR; INTRAVENOUS ONCE
Status: COMPLETED | OUTPATIENT
Start: 2024-06-05 | End: 2024-06-05

## 2024-06-04 RX ORDER — SODIUM CHLORIDE 0.9 % (FLUSH) 0.9 %
10 SYRINGE (ML) INJECTION AS NEEDED
Status: DISCONTINUED | OUTPATIENT
Start: 2024-06-04 | End: 2024-06-07 | Stop reason: HOSPADM

## 2024-06-04 RX ORDER — ONDANSETRON 2 MG/ML
4 INJECTION INTRAMUSCULAR; INTRAVENOUS ONCE
Status: COMPLETED | OUTPATIENT
Start: 2024-06-04 | End: 2024-06-04

## 2024-06-04 RX ADMIN — SODIUM CHLORIDE, POTASSIUM CHLORIDE, SODIUM LACTATE AND CALCIUM CHLORIDE 1000 ML: 600; 310; 30; 20 INJECTION, SOLUTION INTRAVENOUS at 23:30

## 2024-06-04 RX ADMIN — ONDANSETRON 4 MG: 2 INJECTION INTRAMUSCULAR; INTRAVENOUS at 22:42

## 2024-06-04 RX ADMIN — IOPAMIDOL 85 ML: 612 INJECTION, SOLUTION INTRAVENOUS at 23:21

## 2024-06-04 RX ADMIN — ACETAMINOPHEN 1000 MG: 500 TABLET ORAL at 22:41

## 2024-06-05 PROBLEM — L30.9 DERMATITIS, UNSPECIFIED: Status: ACTIVE | Noted: 2024-06-05

## 2024-06-05 PROBLEM — G43.909 MIGRAINE: Status: ACTIVE | Noted: 2024-06-05

## 2024-06-05 PROBLEM — A41.9 SEPSIS: Status: ACTIVE | Noted: 2024-06-05

## 2024-06-05 PROBLEM — G40.909 SEIZURE DISORDER: Status: ACTIVE | Noted: 2024-06-05

## 2024-06-05 PROBLEM — R50.2 DRUG INDUCED FEVER: Status: ACTIVE | Noted: 2024-06-05

## 2024-06-05 PROBLEM — Z88.2 ALLERGY TO SULFA DRUGS: Status: ACTIVE | Noted: 2024-06-05

## 2024-06-05 PROBLEM — L03.90 CELLULITIS: Status: ACTIVE | Noted: 2024-06-05

## 2024-06-05 PROBLEM — E87.21 ACUTE LACTIC ACIDOSIS: Status: ACTIVE | Noted: 2024-06-05

## 2024-06-05 LAB
ANION GAP SERPL CALCULATED.3IONS-SCNC: 11.2 MMOL/L (ref 5–15)
B PARAPERT DNA SPEC QL NAA+PROBE: NOT DETECTED
B PERT DNA SPEC QL NAA+PROBE: NOT DETECTED
BACTERIA UR QL AUTO: ABNORMAL /HPF
BILIRUB UR QL STRIP: NEGATIVE
BUN SERPL-MCNC: 8 MG/DL (ref 6–20)
BUN/CREAT SERPL: 10.4 (ref 7–25)
C PNEUM DNA NPH QL NAA+NON-PROBE: NOT DETECTED
CALCIUM SPEC-SCNC: 8.3 MG/DL (ref 8.6–10.5)
CHLORIDE SERPL-SCNC: 107 MMOL/L (ref 98–107)
CLARITY UR: CLEAR
CO2 SERPL-SCNC: 20.8 MMOL/L (ref 22–29)
COLOR UR: YELLOW
CREAT SERPL-MCNC: 0.77 MG/DL (ref 0.57–1)
D-LACTATE SERPL-SCNC: 1.1 MMOL/L (ref 0.5–2)
D-LACTATE SERPL-SCNC: 2.2 MMOL/L (ref 0.5–2)
DEPRECATED RDW RBC AUTO: 43.4 FL (ref 37–54)
EGFRCR SERPLBLD CKD-EPI 2021: 99.5 ML/MIN/1.73
ERYTHROCYTE [DISTWIDTH] IN BLOOD BY AUTOMATED COUNT: 11.9 % (ref 12.3–15.4)
FLUAV RNA RESP QL NAA+PROBE: NOT DETECTED
FLUAV SUBTYP SPEC NAA+PROBE: NOT DETECTED
FLUBV RNA ISLT QL NAA+PROBE: NOT DETECTED
FLUBV RNA RESP QL NAA+PROBE: NOT DETECTED
GLUCOSE SERPL-MCNC: 131 MG/DL (ref 65–99)
GLUCOSE UR STRIP-MCNC: NEGATIVE MG/DL
HADV DNA SPEC NAA+PROBE: NOT DETECTED
HCOV 229E RNA SPEC QL NAA+PROBE: NOT DETECTED
HCOV HKU1 RNA SPEC QL NAA+PROBE: NOT DETECTED
HCOV NL63 RNA SPEC QL NAA+PROBE: NOT DETECTED
HCOV OC43 RNA SPEC QL NAA+PROBE: NOT DETECTED
HCT VFR BLD AUTO: 38.5 % (ref 34–46.6)
HGB BLD-MCNC: 12.7 G/DL (ref 12–15.9)
HGB UR QL STRIP.AUTO: ABNORMAL
HMPV RNA NPH QL NAA+NON-PROBE: NOT DETECTED
HPIV1 RNA ISLT QL NAA+PROBE: NOT DETECTED
HPIV2 RNA SPEC QL NAA+PROBE: NOT DETECTED
HPIV3 RNA NPH QL NAA+PROBE: NOT DETECTED
HPIV4 P GENE NPH QL NAA+PROBE: NOT DETECTED
HYALINE CASTS UR QL AUTO: ABNORMAL /LPF
KETONES UR QL STRIP: NEGATIVE
LEUKOCYTE ESTERASE UR QL STRIP.AUTO: ABNORMAL
M PNEUMO IGG SER IA-ACNC: NOT DETECTED
MCH RBC QN AUTO: 32.7 PG (ref 26.6–33)
MCHC RBC AUTO-ENTMCNC: 33 G/DL (ref 31.5–35.7)
MCV RBC AUTO: 99.2 FL (ref 79–97)
MRSA DNA SPEC QL NAA+PROBE: NORMAL
NITRITE UR QL STRIP: NEGATIVE
PH UR STRIP.AUTO: 5.5 [PH] (ref 5–8)
PLATELET # BLD AUTO: 283 10*3/MM3 (ref 140–450)
PMV BLD AUTO: 9.5 FL (ref 6–12)
POTASSIUM SERPL-SCNC: 4.1 MMOL/L (ref 3.5–5.2)
PROCALCITONIN SERPL-MCNC: 0.21 NG/ML (ref 0–0.25)
PROT UR QL STRIP: ABNORMAL
RBC # BLD AUTO: 3.88 10*6/MM3 (ref 3.77–5.28)
RBC # UR STRIP: ABNORMAL /HPF
REF LAB TEST METHOD: ABNORMAL
RHINOVIRUS RNA SPEC NAA+PROBE: NOT DETECTED
RSV RNA NPH QL NAA+NON-PROBE: NOT DETECTED
RSV RNA RESP QL NAA+PROBE: NOT DETECTED
SARS-COV-2 RNA NPH QL NAA+NON-PROBE: NOT DETECTED
SARS-COV-2 RNA RESP QL NAA+PROBE: NOT DETECTED
SODIUM SERPL-SCNC: 139 MMOL/L (ref 136–145)
SP GR UR STRIP: >1.03 (ref 1–1.03)
SQUAMOUS #/AREA URNS HPF: ABNORMAL /HPF
UROBILINOGEN UR QL STRIP: ABNORMAL
WAXY CASTS #/AREA URNS LPF: ABNORMAL /LPF
WBC # UR STRIP: ABNORMAL /HPF
WBC NRBC COR # BLD AUTO: 11.71 10*3/MM3 (ref 3.4–10.8)
YEAST URNS QL MICRO: ABNORMAL /HPF

## 2024-06-05 PROCEDURE — 25010000002 ONDANSETRON PER 1 MG: Performed by: EMERGENCY MEDICINE

## 2024-06-05 PROCEDURE — 81001 URINALYSIS AUTO W/SCOPE: CPT

## 2024-06-05 PROCEDURE — 80048 BASIC METABOLIC PNL TOTAL CA: CPT | Performed by: NURSE PRACTITIONER

## 2024-06-05 PROCEDURE — 25010000002 METOCLOPRAMIDE PER 10 MG: Performed by: HOSPITALIST

## 2024-06-05 PROCEDURE — 25810000003 SODIUM CHLORIDE 0.9 % SOLUTION: Performed by: NURSE PRACTITIONER

## 2024-06-05 PROCEDURE — G0378 HOSPITAL OBSERVATION PER HR: HCPCS

## 2024-06-05 PROCEDURE — 25010000002 PROCHLORPERAZINE 10 MG/2ML SOLUTION: Performed by: HOSPITALIST

## 2024-06-05 PROCEDURE — 83605 ASSAY OF LACTIC ACID: CPT | Performed by: EMERGENCY MEDICINE

## 2024-06-05 PROCEDURE — 85027 COMPLETE CBC AUTOMATED: CPT | Performed by: NURSE PRACTITIONER

## 2024-06-05 PROCEDURE — 87641 MR-STAPH DNA AMP PROBE: CPT | Performed by: NURSE PRACTITIONER

## 2024-06-05 PROCEDURE — 0202U NFCT DS 22 TRGT SARS-COV-2: CPT | Performed by: HOSPITALIST

## 2024-06-05 PROCEDURE — 25010000002 PIPERACILLIN SOD-TAZOBACTAM PER 1 G: Performed by: NURSE PRACTITIONER

## 2024-06-05 PROCEDURE — 25010000002 MORPHINE PER 10 MG: Performed by: EMERGENCY MEDICINE

## 2024-06-05 PROCEDURE — 25010000002 METHYLPREDNISOLONE PER 125 MG: Performed by: HOSPITALIST

## 2024-06-05 PROCEDURE — 25010000002 DIPHENHYDRAMINE PER 50 MG: Performed by: HOSPITALIST

## 2024-06-05 PROCEDURE — 96365 THER/PROPH/DIAG IV INF INIT: CPT

## 2024-06-05 PROCEDURE — 25810000003 SODIUM CHLORIDE 0.9 % SOLUTION 250 ML FLEX CONT: Performed by: NURSE PRACTITIONER

## 2024-06-05 PROCEDURE — 25810000003 SODIUM CHLORIDE 0.9 % SOLUTION: Performed by: PHYSICIAN ASSISTANT

## 2024-06-05 PROCEDURE — 96375 TX/PRO/DX INJ NEW DRUG ADDON: CPT

## 2024-06-05 PROCEDURE — 96376 TX/PRO/DX INJ SAME DRUG ADON: CPT

## 2024-06-05 PROCEDURE — 63710000001 DIPHENHYDRAMINE PER 50 MG: Performed by: NURSE PRACTITIONER

## 2024-06-05 PROCEDURE — 25010000002 VANCOMYCIN 10 G RECONSTITUTED SOLUTION: Performed by: PHYSICIAN ASSISTANT

## 2024-06-05 PROCEDURE — 25010000002 VANCOMYCIN HCL 1.25 G RECONSTITUTED SOLUTION 1 EACH VIAL: Performed by: NURSE PRACTITIONER

## 2024-06-05 PROCEDURE — 25010000002 THIAMINE HCL 200 MG/2ML SOLUTION: Performed by: HOSPITALIST

## 2024-06-05 PROCEDURE — 25010000002 PIPERACILLIN SOD-TAZOBACTAM PER 1 G: Performed by: PHYSICIAN ASSISTANT

## 2024-06-05 RX ORDER — BISACODYL 10 MG
10 SUPPOSITORY, RECTAL RECTAL DAILY PRN
Status: DISCONTINUED | OUTPATIENT
Start: 2024-06-05 | End: 2024-06-07 | Stop reason: HOSPADM

## 2024-06-05 RX ORDER — CALCIUM CARBONATE 500 MG/1
2 TABLET, CHEWABLE ORAL 2 TIMES DAILY PRN
Status: DISCONTINUED | OUTPATIENT
Start: 2024-06-05 | End: 2024-06-07 | Stop reason: HOSPADM

## 2024-06-05 RX ORDER — BUTALBITAL, ACETAMINOPHEN AND CAFFEINE 50; 325; 40 MG/1; MG/1; MG/1
2 TABLET ORAL ONCE
Status: COMPLETED | OUTPATIENT
Start: 2024-06-05 | End: 2024-06-05

## 2024-06-05 RX ORDER — LORAZEPAM 1 MG/1
1 TABLET ORAL
Status: DISCONTINUED | OUTPATIENT
Start: 2024-06-05 | End: 2024-06-07 | Stop reason: HOSPADM

## 2024-06-05 RX ORDER — LEVETIRACETAM 500 MG/1
1000 TABLET ORAL 2 TIMES DAILY
Status: DISCONTINUED | OUTPATIENT
Start: 2024-06-05 | End: 2024-06-07 | Stop reason: HOSPADM

## 2024-06-05 RX ORDER — DIPHENHYDRAMINE HCL 25 MG
25 CAPSULE ORAL ONCE
Status: COMPLETED | OUTPATIENT
Start: 2024-06-05 | End: 2024-06-05

## 2024-06-05 RX ORDER — HYDROMORPHONE HYDROCHLORIDE 1 MG/ML
0.5 INJECTION, SOLUTION INTRAMUSCULAR; INTRAVENOUS; SUBCUTANEOUS
Status: DISCONTINUED | OUTPATIENT
Start: 2024-06-05 | End: 2024-06-07 | Stop reason: HOSPADM

## 2024-06-05 RX ORDER — NITROGLYCERIN 0.4 MG/1
0.4 TABLET SUBLINGUAL
Status: DISCONTINUED | OUTPATIENT
Start: 2024-06-05 | End: 2024-06-07 | Stop reason: HOSPADM

## 2024-06-05 RX ORDER — POLYETHYLENE GLYCOL 3350 17 G/17G
17 POWDER, FOR SOLUTION ORAL DAILY PRN
Status: DISCONTINUED | OUTPATIENT
Start: 2024-06-05 | End: 2024-06-07 | Stop reason: HOSPADM

## 2024-06-05 RX ORDER — BACLOFEN 10 MG/1
TABLET ORAL
COMMUNITY
Start: 2024-06-03

## 2024-06-05 RX ORDER — METOCLOPRAMIDE HYDROCHLORIDE 5 MG/ML
10 INJECTION INTRAMUSCULAR; INTRAVENOUS ONCE
Status: COMPLETED | OUTPATIENT
Start: 2024-06-05 | End: 2024-06-05

## 2024-06-05 RX ORDER — MIDAZOLAM HYDROCHLORIDE 1 MG/ML
4 INJECTION INTRAMUSCULAR; INTRAVENOUS
Status: DISCONTINUED | OUTPATIENT
Start: 2024-06-05 | End: 2024-06-07 | Stop reason: HOSPADM

## 2024-06-05 RX ORDER — METHYLPREDNISOLONE SODIUM SUCCINATE 125 MG/2ML
125 INJECTION, POWDER, LYOPHILIZED, FOR SOLUTION INTRAMUSCULAR; INTRAVENOUS ONCE
Status: COMPLETED | OUTPATIENT
Start: 2024-06-05 | End: 2024-06-05

## 2024-06-05 RX ORDER — HYDROCODONE BITARTRATE AND ACETAMINOPHEN 5; 325 MG/1; MG/1
1 TABLET ORAL EVERY 6 HOURS PRN
Status: DISCONTINUED | OUTPATIENT
Start: 2024-06-05 | End: 2024-06-07 | Stop reason: HOSPADM

## 2024-06-05 RX ORDER — DIPHENHYDRAMINE HYDROCHLORIDE 50 MG/ML
25 INJECTION INTRAMUSCULAR; INTRAVENOUS ONCE
Status: COMPLETED | OUTPATIENT
Start: 2024-06-05 | End: 2024-06-05

## 2024-06-05 RX ORDER — SODIUM CHLORIDE 9 MG/ML
40 INJECTION, SOLUTION INTRAVENOUS AS NEEDED
Status: DISCONTINUED | OUTPATIENT
Start: 2024-06-05 | End: 2024-06-07 | Stop reason: HOSPADM

## 2024-06-05 RX ORDER — SODIUM CHLORIDE 0.9 % (FLUSH) 0.9 %
10 SYRINGE (ML) INJECTION AS NEEDED
Status: DISCONTINUED | OUTPATIENT
Start: 2024-06-05 | End: 2024-06-07 | Stop reason: HOSPADM

## 2024-06-05 RX ORDER — ACETAMINOPHEN 160 MG/5ML
650 SOLUTION ORAL EVERY 4 HOURS PRN
Status: DISCONTINUED | OUTPATIENT
Start: 2024-06-05 | End: 2024-06-07 | Stop reason: HOSPADM

## 2024-06-05 RX ORDER — SODIUM CHLORIDE 9 MG/ML
100 INJECTION, SOLUTION INTRAVENOUS CONTINUOUS
Status: DISCONTINUED | OUTPATIENT
Start: 2024-06-05 | End: 2024-06-07 | Stop reason: HOSPADM

## 2024-06-05 RX ORDER — NICOTINE 21 MG/24HR
1 PATCH, TRANSDERMAL 24 HOURS TRANSDERMAL
Status: DISCONTINUED | OUTPATIENT
Start: 2024-06-05 | End: 2024-06-07 | Stop reason: HOSPADM

## 2024-06-05 RX ORDER — ONDANSETRON 2 MG/ML
4 INJECTION INTRAMUSCULAR; INTRAVENOUS EVERY 6 HOURS PRN
Status: DISCONTINUED | OUTPATIENT
Start: 2024-06-05 | End: 2024-06-07 | Stop reason: HOSPADM

## 2024-06-05 RX ORDER — LORAZEPAM 1 MG/1
2 TABLET ORAL
Status: DISCONTINUED | OUTPATIENT
Start: 2024-06-05 | End: 2024-06-07 | Stop reason: HOSPADM

## 2024-06-05 RX ORDER — MIDAZOLAM HYDROCHLORIDE 1 MG/ML
2 INJECTION INTRAMUSCULAR; INTRAVENOUS
Status: DISCONTINUED | OUTPATIENT
Start: 2024-06-05 | End: 2024-06-07 | Stop reason: HOSPADM

## 2024-06-05 RX ORDER — ONDANSETRON 4 MG/1
4 TABLET, ORALLY DISINTEGRATING ORAL EVERY 6 HOURS PRN
Status: DISCONTINUED | OUTPATIENT
Start: 2024-06-05 | End: 2024-06-07 | Stop reason: HOSPADM

## 2024-06-05 RX ORDER — ACETAMINOPHEN 650 MG/1
650 SUPPOSITORY RECTAL EVERY 4 HOURS PRN
Status: DISCONTINUED | OUTPATIENT
Start: 2024-06-05 | End: 2024-06-07 | Stop reason: HOSPADM

## 2024-06-05 RX ORDER — PROCHLORPERAZINE EDISYLATE 5 MG/ML
10 INJECTION INTRAMUSCULAR; INTRAVENOUS ONCE
Status: COMPLETED | OUTPATIENT
Start: 2024-06-05 | End: 2024-06-05

## 2024-06-05 RX ORDER — SULFAMETHOXAZOLE AND TRIMETHOPRIM 800; 160 MG/1; MG/1
1 TABLET ORAL 2 TIMES DAILY
COMMUNITY
End: 2024-06-07 | Stop reason: HOSPADM

## 2024-06-05 RX ORDER — IBUPROFEN 200 MG
400 TABLET ORAL EVERY 6 HOURS PRN
COMMUNITY

## 2024-06-05 RX ORDER — ACETAMINOPHEN 325 MG/1
650 TABLET ORAL EVERY 4 HOURS PRN
Status: DISCONTINUED | OUTPATIENT
Start: 2024-06-05 | End: 2024-06-07 | Stop reason: HOSPADM

## 2024-06-05 RX ORDER — AMOXICILLIN 250 MG
2 CAPSULE ORAL 2 TIMES DAILY PRN
Status: DISCONTINUED | OUTPATIENT
Start: 2024-06-05 | End: 2024-06-07 | Stop reason: HOSPADM

## 2024-06-05 RX ORDER — THIAMINE HYDROCHLORIDE 100 MG/ML
200 INJECTION, SOLUTION INTRAMUSCULAR; INTRAVENOUS EVERY 8 HOURS SCHEDULED
Status: DISCONTINUED | OUTPATIENT
Start: 2024-06-05 | End: 2024-06-07 | Stop reason: HOSPADM

## 2024-06-05 RX ORDER — FOLIC ACID 1 MG/1
1 TABLET ORAL DAILY
Status: DISCONTINUED | OUTPATIENT
Start: 2024-06-05 | End: 2024-06-07 | Stop reason: HOSPADM

## 2024-06-05 RX ORDER — SODIUM CHLORIDE 0.9 % (FLUSH) 0.9 %
10 SYRINGE (ML) INJECTION EVERY 12 HOURS SCHEDULED
Status: DISCONTINUED | OUTPATIENT
Start: 2024-06-05 | End: 2024-06-07 | Stop reason: HOSPADM

## 2024-06-05 RX ORDER — BISACODYL 5 MG/1
5 TABLET, DELAYED RELEASE ORAL DAILY PRN
Status: DISCONTINUED | OUTPATIENT
Start: 2024-06-05 | End: 2024-06-07 | Stop reason: HOSPADM

## 2024-06-05 RX ORDER — BUTALBITAL, ACETAMINOPHEN AND CAFFEINE 50; 325; 40 MG/1; MG/1; MG/1
1 TABLET ORAL EVERY 4 HOURS PRN
Status: DISCONTINUED | OUTPATIENT
Start: 2024-06-05 | End: 2024-06-07 | Stop reason: HOSPADM

## 2024-06-05 RX ADMIN — MORPHINE SULFATE 4 MG: 2 INJECTION, SOLUTION INTRAMUSCULAR; INTRAVENOUS at 00:13

## 2024-06-05 RX ADMIN — PIPERACILLIN AND TAZOBACTAM 4.5 G: 4; .5 INJECTION, POWDER, FOR SOLUTION INTRAVENOUS at 17:13

## 2024-06-05 RX ADMIN — THIAMINE HYDROCHLORIDE 200 MG: 100 INJECTION, SOLUTION INTRAMUSCULAR; INTRAVENOUS at 20:27

## 2024-06-05 RX ADMIN — DIPHENHYDRAMINE HYDROCHLORIDE 25 MG: 50 INJECTION, SOLUTION INTRAMUSCULAR; INTRAVENOUS at 11:00

## 2024-06-05 RX ADMIN — METHYLPREDNISOLONE SODIUM SUCCINATE 125 MG: 125 INJECTION, POWDER, FOR SOLUTION INTRAMUSCULAR; INTRAVENOUS at 17:14

## 2024-06-05 RX ADMIN — LEVETIRACETAM 1000 MG: 500 TABLET, FILM COATED ORAL at 20:27

## 2024-06-05 RX ADMIN — PIPERACILLIN AND TAZOBACTAM 4.5 G: 4; .5 INJECTION, POWDER, FOR SOLUTION INTRAVENOUS at 08:53

## 2024-06-05 RX ADMIN — Medication 10 ML: at 08:53

## 2024-06-05 RX ADMIN — ONDANSETRON 4 MG: 2 INJECTION INTRAMUSCULAR; INTRAVENOUS at 00:13

## 2024-06-05 RX ADMIN — BUTALBITAL, ACETAMINOPHEN, AND CAFFEINE 1 TABLET: 50; 325; 40 TABLET ORAL at 17:13

## 2024-06-05 RX ADMIN — VANCOMYCIN HYDROCHLORIDE 2250 MG: 10 INJECTION, POWDER, LYOPHILIZED, FOR SOLUTION INTRAVENOUS at 02:00

## 2024-06-05 RX ADMIN — SODIUM CHLORIDE 100 ML/HR: 9 INJECTION, SOLUTION INTRAVENOUS at 17:14

## 2024-06-05 RX ADMIN — ACETAMINOPHEN 325MG 650 MG: 325 TABLET ORAL at 14:20

## 2024-06-05 RX ADMIN — ACETAMINOPHEN 325MG 650 MG: 325 TABLET ORAL at 04:46

## 2024-06-05 RX ADMIN — METOCLOPRAMIDE 10 MG: 5 INJECTION, SOLUTION INTRAMUSCULAR; INTRAVENOUS at 11:00

## 2024-06-05 RX ADMIN — THIAMINE HYDROCHLORIDE 200 MG: 100 INJECTION, SOLUTION INTRAMUSCULAR; INTRAVENOUS at 14:20

## 2024-06-05 RX ADMIN — ACETAMINOPHEN 325MG 650 MG: 325 TABLET ORAL at 08:53

## 2024-06-05 RX ADMIN — PROCHLORPERAZINE EDISYLATE 10 MG: 5 INJECTION INTRAMUSCULAR; INTRAVENOUS at 10:59

## 2024-06-05 RX ADMIN — NICOTINE 1 PATCH: 21 PATCH, EXTENDED RELEASE TRANSDERMAL at 08:53

## 2024-06-05 RX ADMIN — SODIUM CHLORIDE 100 ML/HR: 9 INJECTION, SOLUTION INTRAVENOUS at 03:21

## 2024-06-05 RX ADMIN — BUTALBITAL, ACETAMINOPHEN, AND CAFFEINE 2 TABLET: 50; 325; 40 TABLET ORAL at 03:20

## 2024-06-05 RX ADMIN — DIPHENHYDRAMINE HYDROCHLORIDE 25 MG: 25 CAPSULE ORAL at 21:17

## 2024-06-05 RX ADMIN — SODIUM CHLORIDE 1250 MG: 9 INJECTION, SOLUTION INTRAVENOUS at 14:19

## 2024-06-05 RX ADMIN — PIPERACILLIN AND TAZOBACTAM 3.38 G: 3; .375 INJECTION, POWDER, FOR SOLUTION INTRAVENOUS at 00:52

## 2024-06-05 RX ADMIN — FOLIC ACID 1 MG: 1 TABLET ORAL at 12:49

## 2024-06-05 RX ADMIN — LEVETIRACETAM 1000 MG: 500 TABLET, FILM COATED ORAL at 12:49

## 2024-06-05 NOTE — PROGRESS NOTES
Discharge Planning Assessment  [facility]     Patient Name: Magdalena Carreon  MRN: [id number]  Today's Date: 6/5/2024    Admit Date: 6/4/2024    Plan: Return home with family   Discharge Needs Assessment       Row Name 06/05/24 0859       Living Environment    People in Home significant other;child(cintia), dependent;child(cintia), adult    Name(s) of People in Home S/O Kevan Sánchez [phone] and children ages 16 & 21    Current Living Arrangements home    Potentially Unsafe Housing Conditions none    Primary Care Provided by self    Provides Primary Care For no one    Family Caregiver if Needed child(cintia), adult    Quality of Family Relationships helpful    Able to Return to Prior Arrangements yes       Resource/Environmental Concerns    Resource/Environmental Concerns none    Transportation Concerns none       Transition Planning    Patient/Family Anticipates Transition to home with family    Patient/Family Anticipated Services at Transition none    Transportation Anticipated family or friend will provide       Discharge Needs Assessment    Readmission Within the Last 30 Days no previous admission in last 30 days    Equipment Currently Used at Home none    Concerns to be Addressed denies needs/concerns at this time    Anticipated Changes Related to Illness none    Equipment Needed After Discharge none                   Discharge Plan       Row Name 06/05/24 0900       Plan    Plan Return home with family    Patient/Family in Agreement with Plan yes    Plan Comments Spoke with patient at bedside.  Patient lives with S/O Kevan Sánchez [phone] and children ages 16 & 21.  She does not use any DME, has never used HH.  PCp is Dr. Sonja Broussard and pharmacy is [facility] on [address].  Patient plans to return home at [place].  Family will assist if needed.  CCP will follow.  Emma LEWIS                  Continued Care and Services - Admitted Since 6/4/2024    No active coordination exists for this  encounter.       Expected Discharge Date and Time       Expected Discharge Date Expected Discharge Time    Jun 7, 2024            Demographic Summary       Row Name 06/05/24 0858       General Information    Admission Type inpatient    Arrived From home    Referral Source admission list    Reason for Consult discharge planning    Preferred Language English                   Functional Status       Row Name 06/05/24 0858       Functional Status    Usual Activity Tolerance good    Current Activity Tolerance moderate       Functional Status, IADL    Medications independent    Meal Preparation independent    Housekeeping independent    Laundry independent    Shopping independent       Mental Status    General Appearance WDL WDL       Mental Status Summary    Recent Changes in Mental Status/Cognitive Functioning no changes                            Becky S. [place], RN

## 2024-06-05 NOTE — PROGRESS NOTES
Clinical Pharmacy Services: Medication History    Magdalena Carreon is a 39 y.o. female presenting to [facility] for Sepsis [A41.9]  Cellulitis of other specified site [L03.818]  Sepsis without acute organ dysfunction, due to unspecified organism [A41.9]  Acute lactic acidosis [E87.21]    She  has a past medical history of Abnormal Pap smear of cervix, Migraine, and Seizure disorder.    Allergies as of 06/04/2024 - Reviewed 06/04/2024   Allergen Reaction Noted    Prednisone Hives 03/19/2024       Medication information was obtained from: patient   Pharmacy and Phone Number:     Prior to Admission Medications       Prescriptions Last Dose Informant Patient Reported? Taking?    ibuprofen (ADVIL,MOTRIN) 200 MG tablet  Self Yes Yes    Take 2 tablets by mouth Every 6 (Six) Hours As Needed for Mild Pain.    levETIRAcetam (KEPPRA) 1000 MG tablet 6/4/2024 Self Yes Yes    Take 1 tablet by mouth 2 (Two) Times a Day.    ondansetron ODT (ZOFRAN-ODT) 4 MG disintegrating tablet Past Week Self No Yes    Place 2 tablets on the tongue Every 8 (Eight) Hours As Needed for Nausea or Vomiting.    promethazine (PHENERGAN) 25 MG tablet Past Week Self No Yes    Take 1 tablet by mouth Every 6 (Six) Hours As Needed for Nausea or Vomiting. May cause drowsiness.    sulfamethoxazole-trimethoprim (BACTRIM DS,SEPTRA DS) 800-160 MG per tablet 6/4/2024 Self Yes Yes    Take 1 tablet by mouth 2 (Two) Times a Day.    baclofen (LIORESAL) 10 MG tablet  Self Yes No    Has new Rx that she has not started as of June 2024       Medication notes:     This medication list is complete to the best of my knowledge as of 6/5/2024    Please call if questions.    Renee Soler, PharmDERICK, BCPS  6/5/2024 08:36 EDT

## 2024-06-05 NOTE — ED NOTES
Nursing report ED to floor  Magdalena Carreon  39 y.o.  female    HPI :  HPI (Adult)  Stated Reason for Visit: pt to er for Abd pain, N?V? D after statrting abx today  History Obtained From: patient    Chief Complaint  Chief Complaint   Patient presents with    Chills    Nausea    Vomiting     Pt is a 39 y.o. female with history of alcohol abuse who presents for nausea and vomiting with fever, chills, generalized bodyaches, headache, and abdominal cramping that began this evening around 6:37 PM.  Patient states she is also had a slight cough.  States she felt okay this morning.  Denies known sick contacts.  States she was just prescribed Bactrim and she took a dose around 6 PM, had not eaten much prior to taking it, and then went and ate at [employer] and had 1 piece of quesadilla and then started to feel ill.  Denies chest pain, shortness of breath, diarrhea, urinary symptoms, sore throat.  Admits to drinking 3-4 shots of liquor today.      Medical Record Review:  Reviewed pelvic ultrasound from 5/13/2022  IMPRESSION:   1. Normal ultrasound appearance of uterus, uterine endometrial echo complex and ovaries.       PAST MEDICAL HISTORY       Active Ambulatory Problems     Diagnosis Date Noted    No Active Ambulatory Problems           Resolved Ambulatory Problems     Diagnosis Date Noted    No Resolved Ambulatory Problems           Past Medical History:   Diagnosis Date    Abnormal Pap smear of cervix      Migraine      Seizure disorder        Admitting doctor:   Joey Appiah MD    Admitting diagnosis:   The primary encounter diagnosis was Sepsis without acute organ dysfunction, due to unspecified organism. Diagnoses of Cellulitis of other specified site and Acute lactic acidosis were also pertinent to this visit.    Code status:   Current Code Status       Date Active Code Status Order ID Comments User Context       Not on file        Allergies:   Prednisone    Isolation:   Enhanced Droplet/Contact     Intake and  Output  No intake or output data in the 24 hours ending 06/05/24 0135    Weight:       06/04/24 2209   Weight: 118 kg (260 lb 2.3 oz)     Most recent vitals:   Vitals:    06/04/24 2316 06/04/24 2337 06/05/24 0001 06/05/24 0101   BP:    114/69   BP Location:       Patient Position:       Pulse: (!) 126 (!) 125  116   Resp: 18   20   Temp:   (!) 101.1 °F (38.4 °C)    TempSrc:   Oral    SpO2: 93% 95%  91%   Weight:       Height:         Active LDAs/IV Access:   Lines, Drains & Airways       Active LDAs       Name Placement date Placement time Site Days    Peripheral IV 06/04/24 2242 Left Antecubital 06/04/24 2242  Antecubital  less than 1    Peripheral IV 06/05/24 0051 Right Antecubital 06/05/24  0051  Antecubital  less than 1                Labs (abnormal labs have a star):   Labs Reviewed   COMPREHENSIVE METABOLIC PANEL - Abnormal; Notable for the following components:       Result Value    Glucose 107 (*)     CO2 20.4 (*)     All other components within normal limits    Narrative:     GFR Normal >60  Chronic Kidney Disease <60  Kidney Failure <15     URINALYSIS W/ MICROSCOPIC IF INDICATED (NO CULTURE) - Abnormal; Notable for the following components:    Specific Gravity, UA >1.030 (*)     Blood, UA Large (3+) (*)     Protein, UA Trace (*)     Leuk Esterase, UA Trace (*)     All other components within normal limits   CBC WITH AUTO DIFFERENTIAL - Abnormal; Notable for the following components:    RBC 3.23 (*)     Hemoglobin 10.6 (*)     Hematocrit 31.6 (*)     MCV 97.8 (*)     RDW 11.6 (*)     Neutrophil % 94.2 (*)     Lymphocyte % 4.3 (*)     Monocyte % 1.2 (*)     Eosinophil % 0.0 (*)     Lymphocytes, Absolute 0.25 (*)     Monocytes, Absolute 0.07 (*)     All other components within normal limits   LACTIC ACID, PLASMA - Abnormal; Notable for the following components:    Lactate 3.7 (*)     All other components within normal limits   URINALYSIS, MICROSCOPIC ONLY - Abnormal; Notable for the following components:     RBC, UA 3-5 (*)     Squamous Epithelial Cells, UA 3-6 (*)     All other components within normal limits   COVID-19/FLUA&B/RSV, NP SWAB IN TRANSPORT MEDIA 1 HR TAT - Normal    Narrative:     Fact sheet for providers: https://www.fda.gov/media/588039/download    Fact sheet for patients: https://www.fda.gov/media/598092/download    Test performed by PCR.   LIPASE - Normal   HCG, SERUM, QUALITATIVE - Normal   MAGNESIUM - Normal   BLOOD CULTURE   BLOOD CULTURE   RAINBOW DRAW    Narrative:     The following orders were created for panel order [place] Draw.  Procedure                               Abnormality         Status                     ---------                               -----------         ------                     Green Top (Gel)[[id number]]                                  Final result               Lavender Top[[id number]]                                     Final result               Gold Top - SST[[id number]]                                                              Light Blue Top[[id number]]                                   Final result                 Please view results for these tests on the individual orders.   ETHANOL   LACTIC ACID, REFLEX   PROCALCITONIN   CBC AND DIFFERENTIAL    Narrative:     The following orders were created for panel order CBC & Differential.  Procedure                               Abnormality         Status                     ---------                               -----------         ------                     CBC Auto Differential[[id number]]        Abnormal            Final result                 Please view results for these tests on the individual orders.   GREEN TOP   LAVENDER TOP   LIGHT BLUE TOP     EKG:   No orders to display     Meds given in ED:   Medications   sodium chloride 0.9 % flush 10 mL (has no administration in time range)   lactated ringers bolus 1,000 mL (has no administration in time range)   Pharmacy to dose vancomycin (has no administration in time  range)   vancomycin 2250 mg/500 mL 0.9% NS IVPB ([facility]) (has no administration in time range)   Vancomycin HCl 1,250 mg in sodium chloride 0.9 % 250 mL VTB (has no administration in time range)   ondansetron (ZOFRAN) injection 4 mg (4 mg Intravenous Given 6/4/24 2242)   acetaminophen (TYLENOL) tablet 1,000 mg (1,000 mg Oral Given 6/4/24 2241)   lactated ringers bolus 1,000 mL (1,000 mL Intravenous New Bag 6/4/24 2330)   iopamidol (ISOVUE-300) 61 % injection 100 mL (85 mL Intravenous Given 6/4/24 2321)   morphine injection 4 mg (4 mg Intravenous Given 6/5/24 0013)   ondansetron (ZOFRAN) injection 4 mg (4 mg Intravenous Given 6/5/24 0013)   piperacillin-tazobactam (ZOSYN) 3.375 g IVPB in 100 mL NS MBP (CD) (3.375 g Intravenous New Bag 6/5/24 0052)     Imaging results:  CT Abdomen Pelvis With Contrast    Result Date: 6/5/2024  Electronically signed by Indra Segovia M.D. on 06-05-24 at 0001    XR Chest 1 View    Result Date: 6/4/2024  Electronically signed by Indra Segovia M.D. on 06-04-24 at 2352     Ambulatory status:   - with assist x1    Social issues:   Social History     Socioeconomic History    Marital status: Single   Tobacco Use    Smoking status: Light Smoker   Substance and Sexual Activity    Drug use: Yes     Types: Marijuana    Sexual activity: Yes     Peripheral Neurovascular  Peripheral Neurovascular (Adult)  Peripheral Neurovascular WDL: WDL    Neuro Cognitive  Neuro Cognitive (Adult)  Cognitive/Neuro/Behavioral WDL: .WDL except  Additional Documentation: (S) Dizziness Assessment (Group), Headache Assessment (Group) (hx of migraines)  Headache Assessment  Headache Location: generalized  Description/Character: sharp  Associated Signs/Symptoms: dizziness, photophobia, vomiting, nausea  Dizziness Assessment  Dizziness Reported Symptoms: (S) constant  Dizziness Occurs With:  (opening eyes)    Learning  Learning Assessment (Adult)  Learning Readiness and Ability: no barriers identified  Education  Provided  Person Taught: patient    Respiratory  Respiratory WDL  Respiratory WDL: (S) .WDL except, cough  Cough Frequency: frequent  Cough Type: (S) productive    Abdominal Pain     Pain Assessments  Pain (Adult)  (0-10) Pain Rating: Rest: 5  (0-10) Pain Rating: Activity: 0  Response to Pain Interventions: nonverbal indicators absent/decreased    NIH Stroke Scale     Shyam Foster RN  06/05/24 01:35 EDT

## 2024-06-05 NOTE — PROGRESS NOTES
[facility] Clinical Pharmacy Services: Piperacillin-Tazobactam Consult    Pt Name: Magdalena Carreon   : 1985    Indication: Sepsis    Relevant clinical data and objective history reviewed:    Past Medical History:   Diagnosis Date    Abnormal Pap smear of cervix     Migraine     Seizure disorder      Creatinine   Date Value Ref Range Status   2024 0.71 0.57 - 1.00 mg/dL Final   2024 0.80 0.57 - 1.00 mg/dL Final   2024 0.90 0.57 - 1.00 mg/dL Final   2022 0.92 0.55 - 1.02 mg/dL Final   2021 0.70 0.7 - 1.5 mg/dL Final   2020 0.7 0.7 - 1.5 mg/dL Final     BUN   Date Value Ref Range Status   2024 8 6 - 20 mg/dL Final   2022 10 7 - 19 mg/dL Final     Estimated Creatinine Clearance: 126.9 mL/min (by C-G formula based on SCr of 0.71 mg/dL).    Lab Results   Component Value Date    WBC 5.80 2024     Temp Readings from Last 3 Encounters:   24 (!) 101.1 °F (38.4 °C) (Oral)   24 99 °F (37.2 °C) (Oral)   24 97.9 °F (36.6 °C) (Tympanic)      Assessment/Plan  Estimated CrCl >20 mL/min at this time; BMI 47.75 kg/m2  Will start piperacillin-tazobactam 4.5 g IV every 8 hours     Pharmacy will continue to follow daily while on piperacillin-tazobactam and adjust as needed. Thank you for this consult.    Isac Adames [facility]  Clinical Pharmacist

## 2024-06-05 NOTE — PLAN OF CARE
Pt. VS WNL. C/o persistent headaches, some relief with oral pain meds and IV migraine cocktail. Pt. Receiving IV abx and IVFs. Pt. Up with assist x1, adequate UOP. Pt. On home dose of oral Keppra. Pt. Received IV steroid dose. Pt. With generalized rash. Will place SCDs when available. Pt. Resting comfortably at present, will continue to monitor closely for the remainder of this RN's shift.      Problem: Adult Inpatient Plan of Care  Goal: Plan of Care Review  Outcome: Ongoing, Progressing  Flowsheets (Taken 6/5/2024 1831)  Progress: improving  Plan of Care Reviewed With: patient

## 2024-06-05 NOTE — ED PROVIDER NOTES
MD ATTESTATION NOTE    The ANAYELI and I have discussed this patient's history, physical exam, and treatment plan.  I have reviewed the documentation and personally had a face to face interaction with the patient. I affirm the documentation and agree with the treatment and plan.  The attached note describes my personal findings.      I provided a substantive portion of the care of the patient.  I personally performed the physical exam in its entirety, and below are my findings.      Brief HPI: This patient is a 39-year-old female presenting to the emergency room today with fevers and chills as well as generalized bodyaches, abdominal pain, as well as nausea and vomiting that began earlier this evening.  She does have a nonproductive cough but denies shortness of breath, chest pain, diarrhea, or dysuria/hematuria.      PHYSICAL EXAM  ED Triage Vitals [06/04/24 2209]   Temp Heart Rate Resp BP SpO2   (!) 102.2 °F (39 °C) (!) 136 18 (!) 195/117 92 %      Temp src Heart Rate Source Patient Position BP Location FiO2 (%)   Tympanic Monitor Sitting Right arm --         GENERAL: Resting comfortably and in no acute distress, nontoxic in appearance  HENT: nares patent  EYES: no scleral icterus  CV: regular rhythm, tachycardic, no M/R/G  RESPIRATORY: normal effort, lungs clear bilaterally  ABDOMEN: soft, nontender, no rebound or guarding  MUSCULOSKELETAL: no deformity, no edema  NEURO: alert, moves all extremities, follows commands  PSYCH:  calm, cooperative  SKIN: warm, dry, mild erythema present to the lower abdomen with warmth to the touch    Vital signs and nursing notes reviewed.        Differential diagnosis includes but is not limited to sepsis, septic shock, urinary tract infection, pneumonia, COVID-19, viral upper respiratory infection, acute bronchitis, acute diverticulitis, acute cholecystitis, acute appendicitis.      Plan: This patient is certainly probably septic given her multiple positive SIRS criteria.  We will  begin IV fluids as well as antipyretics and antiemetics as we obtain labs, urinalysis, chest x-ray, as well as CT scan of the abdomen and pelvis.  Will monitor and reassess following.      Chest x-ray independently interpreted by myself with my interpretation showing no cardiomegaly no area of edema, infiltrate, or pneumothorax.      2325  We are certainly concerned for sepsis given the patient's multiple positive SIRS criteria.  Currently her WBC count is normal as is her chest x-ray.  We are awaiting the remainder of her labs as well as CT scans to hopefully determine the source of infection.  We will monitor closely.    0120  The patient's presentation, workup, as well as diagnosis and treatment plan was discussed at length with ISAAC Timmons for A.  She agrees to admit the patient to the hospital today for Dr. Tamez.        Critical Care    Performed by: Oliva Reynaga PA-C  Authorized by: Eleazar Nelson MD    Critical care provider statement:     Critical care time (minutes):  32    Critical care time was exclusive of:  Separately billable procedures and treating other patients    Critical care was necessary to treat or prevent imminent or life-threatening deterioration of the following conditions:  Sepsis    Critical care was time spent personally by me on the following activities:  Blood draw for specimens, discussions with consultants, development of treatment plan with patient or surrogate, evaluation of patient's response to treatment, examination of patient, obtaining history from patient or surrogate, ordering and performing treatments and interventions, ordering and review of laboratory studies, ordering and review of radiographic studies, pulse oximetry, re-evaluation of patient's condition and review of old charts    Care discussed with: admitting provider             Eleazar Nelson MD  06/05/24 0124

## 2024-06-05 NOTE — H&P
HISTORY AND PHYSICAL   [facility]        Patient Identification:  Name: Magdalena Carreon  Age: 39 y.o.  Sex: female  :  1985  MRN: [id number]                     Primary Care Physician: Sonja Broussard MD    Chief Complaint: Nausea and vomiting with fever    History of Present Illness:       The patient  is a 39 y.o. female with history of alcohol abuse who presents for nausea and vomiting with fever, chills, generalized bodyaches, headache, and abdominal cramping that began this evening around 6:30-7:00 PM.  Patient states she has also had a slight cough.  States she felt okay this morning.  Denies known sick contacts.  States she was just prescribed Bactrim for abdominal wall cellulitis and she took a dose around 6 PM, had not eaten much prior to taking it, and then went and ate at [employer] and had 1 piece of quesadilla and then started to feel ill.  Denies chest pain, shortness of breath, diarrhea, urinary symptoms, sore throat.  Admits to drinking 3-4 shots of liquor today.  She says she thinks she may have had an allergic reaction to Bactrim in the past.  After she took 1 dose she started feeling worse and had fevers.  She has had headaches as well and said redness all over her body now.  The patient was evaluated in the ER and cultures were done she was started on antibiotics and admitted for further evaluation treatment.  She denies having significant outdoor exposure or any tick bites that she is aware of.  She works in a warehouse and drives a forklift.    Past Medical History:  Past Medical History:   Diagnosis Date    Abnormal Pap smear of cervix     Migraine     Seizure disorder      Past Surgical History:  Past Surgical History:   Procedure Laterality Date    CEREBRAL ANEURYSM REPAIR        Home Meds:  Medications Prior to Admission   Medication Sig Dispense Refill Last Dose    ibuprofen (ADVIL,MOTRIN) 200 MG tablet Take 2 tablets by mouth Every 6 (Six) Hours As Needed for Mild  Pain.       levETIRAcetam (KEPPRA) 1000 MG tablet Take 1 tablet by mouth 2 (Two) Times a Day.  0 6/4/2024 at 1000    ondansetron ODT (ZOFRAN-ODT) 4 MG disintegrating tablet Place 2 tablets on the tongue Every 8 (Eight) Hours As Needed for Nausea or Vomiting. 12 tablet 0 Past Week    promethazine (PHENERGAN) 25 MG tablet Take 1 tablet by mouth Every 6 (Six) Hours As Needed for Nausea or Vomiting. May cause drowsiness. 12 tablet 0 Past Week    sulfamethoxazole-trimethoprim (BACTRIM DS,SEPTRA DS) 800-160 MG per tablet Take 1 tablet by mouth 2 (Two) Times a Day.   6/4/2024    baclofen (LIORESAL) 10 MG tablet Has new Rx that she has not started as of June 2024        Current meds    Current Facility-Administered Medications:     acetaminophen (TYLENOL) tablet 650 mg, 650 mg, Oral, Q4H PRN, 650 mg at 06/05/24 1420 **OR** acetaminophen (TYLENOL) 160 MG/5ML oral solution 650 mg, 650 mg, Oral, Q4H PRN **OR** acetaminophen (TYLENOL) suppository 650 mg, 650 mg, Rectal, Q4H PRN, Neena Melton APRN    sennosides-docusate (PERICOLACE) 8.6-50 MG per tablet 2 tablet, 2 tablet, Oral, BID PRN **AND** polyethylene glycol (MIRALAX) packet 17 g, 17 g, Oral, Daily PRN **AND** bisacodyl (DULCOLAX) EC tablet 5 mg, 5 mg, Oral, Daily PRN **AND** bisacodyl (DULCOLAX) suppository 10 mg, 10 mg, Rectal, Daily PRN, Neena Melton APRN    butalbital-acetaminophen-caffeine (FIORICET, ESGIC) -40 MG per tablet 1 tablet, 1 tablet, Oral, Q4H PRN, Nik Muhammad MD    calcium carbonate (TUMS) chewable tablet 500 mg (200 mg elemental), 2 tablet, Oral, BID PRN, Neena Melton APRN    folic acid (FOLVITE) tablet 1 mg, 1 mg, Oral, Daily, Nik Muhammad MD, 1 mg at 06/05/24 1249    HYDROcodone-acetaminophen (NORCO) 5-325 MG per tablet 1 tablet, 1 tablet, Oral, Q6H PRN, Nik Muhammad MD    HYDROmorphone (DILAUDID) injection 0.5 mg, 0.5 mg, Intravenous, Q2H PRN, Nik Muhammad MD    lactated ringers bolus 1,000 mL, 1,000 mL,  Intravenous, Once, Oliva Reynaga PA-C, Stopped at 06/05/24 0735    levETIRAcetam (KEPPRA) tablet 1,000 mg, 1,000 mg, Oral, BID, Nik Muhammad MD, 1,000 mg at 06/05/24 1249    LORazepam (ATIVAN) tablet 1 mg, 1 mg, Oral, Q1H PRN **OR** midazolam (VERSED) injection 2 mg, 2 mg, Intravenous, Q1H PRN **OR** LORazepam (ATIVAN) tablet 2 mg, 2 mg, Oral, Q1H PRN **OR** midazolam (VERSED) injection 4 mg, 4 mg, Intravenous, Q1H PRN **OR** midazolam (VERSED) injection 4 mg, 4 mg, Intravenous, Q15 Min PRN **OR** midazolam (VERSED) injection 4 mg, 4 mg, Intramuscular, Q15 Min PRN, Nik Muhammad MD    Magnesium Standard Dose Replacement - Follow Nurse / BPA Driven Protocol, , Does not apply, PRN, Nik Muhammad MD    methylPREDNISolone sodium succinate (SOLU-Medrol) injection 125 mg, 125 mg, Intravenous, Once, Nik Muhammad MD    nicotine (NICODERM CQ) 21 MG/24HR patch 1 patch, 1 patch, Transdermal, Q24H, Neena Melton APRN, 1 patch at 06/05/24 0853    nitroglycerin (NITROSTAT) SL tablet 0.4 mg, 0.4 mg, Sublingual, Q5 Min PRN, Neena Melton APRN    ondansetron ODT (ZOFRAN-ODT) disintegrating tablet 4 mg, 4 mg, Oral, Q6H PRN **OR** ondansetron (ZOFRAN) injection 4 mg, 4 mg, Intravenous, Q6H PRN, Neena Melton APRN    Pharmacy to dose vancomycin, , Does not apply, Continuous PRN, Neena Melton APRN    piperacillin-tazobactam (ZOSYN) 4.5 g IVPB in 100 mL NS MBP (CD), 4.5 g, Intravenous, Q8H, Neena Melton APRN, 4.5 g at 06/05/24 0853    sodium chloride 0.9 % flush 10 mL, 10 mL, Intravenous, PRN, Emergency, Triage Protocol, MD    sodium chloride 0.9 % flush 10 mL, 10 mL, Intravenous, Q12H, Neena Melton APRN, 10 mL at 06/05/24 0853    sodium chloride 0.9 % flush 10 mL, 10 mL, Intravenous, PRN, Neena Melton APRN    sodium chloride 0.9 % infusion 40 mL, 40 mL, Intravenous, PRN, Neena Melton APRN    sodium chloride 0.9 % infusion, 100 mL/hr, Intravenous,  Continuous, Neena Melton APRN, Last Rate: 100 mL/hr at 06/05/24 0321, 100 mL/hr at 06/05/24 0321    thiamine (B-1) injection 200 mg, 200 mg, Intravenous, Q8H, 200 mg at 06/05/24 1420 **FOLLOWED BY** [START ON 6/10/2024] thiamine (VITAMIN B-1) tablet 100 mg, 100 mg, Oral, Daily, Nik Muhammad MD    Vancomycin HCl 1,250 mg in sodium chloride 0.9 % 250 mL VTB, 1,250 mg, Intravenous, Q12H, Neena Melton APRN, Last Rate: 200 mL/hr at 06/05/24 1419, 1,250 mg at 06/05/24 1419  Allergies:  Allergies   Allergen Reactions    Prednisone Hives     Immunizations:  Immunization History   Administered Date(s) Administered    COVID-19 (MODERNA) 1st,2nd,3rd Dose Monovalent 04/08/2021, 05/06/2021     Social History:   Social History     Social History Narrative    Not on file     Social History     Socioeconomic History    Marital status: Single   Tobacco Use    Smoking status: Every Day     Current packs/day: 1.00     Average packs/day: 1 pack/day for 25.0 years (25.0 ttl pk-yrs)     Types: Cigarettes     Start date: 6/5/1999   Vaping Use    Vaping status: Never Used   Substance and Sexual Activity    Drug use: Yes     Types: Marijuana    Sexual activity: Yes       Family History:  Family History   Problem Relation Age of Onset    Melanoma Father     Diabetes Father     Pancreatic cancer Paternal Grandmother     Diabetes Paternal Grandmother     Diabetes Maternal Grandmother         Review of Systems  See history of present illness and past medical history.  Patient denies headache, dizziness, syncope, falls, trauma, change in vision, change in hearing, change in taste, changes in weight, changes in appetite, focal weakness, numbness, or paresthesia.  Patient denies chest pain, palpitations, dyspnea, orthopnea, PND, cough, sinus pressure, rhinorrhea, epistaxis, hemoptysis, nausea, vomiting, hematemesis, diarrhea, constipation or hematochezia. Denies cold or heat intolerance, polydipsia, polyuria, polyphagia. Denies  "hematuria, pyuria, dysuria, hesitancy, frequency or urgency. Denies consumption of raw and under cooked meats foods or change in water source.  Denies fever, chills, sweats, night sweats.  Denies missing any routine medications. Remainder of ROS is negative.    Objective:  tMax 24 hrs: Temp (24hrs), Av.2 °F (37.9 °C), Min:98.1 °F (36.7 °C), Max:102.2 °F (39 °C)    Vitals Ranges:   Temp:  [98.1 °F (36.7 °C)-102.2 °F (39 °C)] 101.3 °F (38.5 °C)  Heart Rate:  [103-136] 103  Resp:  [18-20] 18  BP: (109-195)/() 109/50      Exam:  /50 (BP Location: Right arm, Patient Position: Lying)   Pulse 103   Temp (!) 101.3 °F (38.5 °C) (Oral) Comment: rn notified  Resp 18   Ht 157.2 cm (61.89\")   Wt 118 kg (260 lb)   LMP 2024 (Approximate)   SpO2 97%   Breastfeeding No   BMI 47.72 kg/m²     General Appearance:    Alert, cooperative, no distress, appears stated age   Head:    Normocephalic, without obvious abnormality, atraumatic   Eyes:    PERRL, conjunctivae/corneas clear, EOM's intact, both eyes   Ears:    Normal external ear canals, both ears   Nose:   Nares normal, septum midline, mucosa normal, no drainage    or sinus tenderness   Throat:   Lips, mucosa, and tongue normal   Neck:   Supple, symmetrical, trachea midline, no adenopathy;     thyroid:  no enlargement/tenderness/nodules; no carotid    bruit or JVD   Back:     Symmetric, no curvature, ROM normal, no CVA tenderness   Lungs:     Clear to auscultation bilaterally, respirations unlabored   Chest Wall:    No tenderness or deformity    Heart:    Regular rate and rhythm, S1 and S2 normal, no murmur, rub   or gallop   Abdomen:     Soft, nontender, bowel sounds active all four quadrants,     no masses, no hepatomegaly, no splenomegaly   Extremities:   Extremities normal, atraumatic, no cyanosis or edema   Pulses:   2+ and symmetric all extremities   Skin:   Skin color, texture, turgor normal, dermatitis rash over anterior abdominal wall and she " is red all over which may be due to drug reaction   Lymph nodes:   Cervical, supraclavicular, and axillary nodes normal   Neurologic:   CNII-XII intact, normal strength, sensation intact throughout      .    Data Review:  Lab Results (last 72 hours)       Procedure Component Value Units Date/Time    STAT Lactic Acid, Reflex [[id number]]  (Normal) Collected: 06/05/24 1517    Specimen: Blood Updated: 06/05/24 1631     Lactate 1.1 mmol/L     STAT Lactic Acid, Reflex [[id number]]  (Abnormal) Collected: 06/05/24 0930    Specimen: Blood Updated: 06/05/24 1014     Lactate 2.2 mmol/L     STAT Lactic Acid, Reflex [[id number]]  (Abnormal) Collected: 06/05/24 0651    Specimen: Blood Updated: 06/05/24 0739     Lactate 2.2 mmol/L     Basic Metabolic Panel [[id number]]  (Abnormal) Collected: 06/05/24 0651    Specimen: Blood Updated: 06/05/24 0738     Glucose 131 mg/dL      BUN 8 mg/dL      Creatinine 0.77 mg/dL      Sodium 139 mmol/L      Potassium 4.1 mmol/L      Chloride 107 mmol/L      CO2 20.8 mmol/L      Calcium 8.3 mg/dL      BUN/Creatinine Ratio 10.4     Anion Gap 11.2 mmol/L      eGFR 99.5 mL/min/1.73     Narrative:      GFR Normal >60  Chronic Kidney Disease <60  Kidney Failure <15      CBC (No Diff) [[id number]]  (Abnormal) Collected: 06/05/24 0651    Specimen: Blood Updated: 06/05/24 0733     WBC 11.71 10*3/mm3      RBC 3.88 10*6/mm3      Hemoglobin 12.7 g/dL      Hematocrit 38.5 %      MCV 99.2 fL      MCH 32.7 pg      MCHC 33.0 g/dL      RDW 11.9 %      RDW-SD 43.4 fl      MPV 9.5 fL      Platelets 283 10*3/mm3     MRSA Screen, PCR (Inpatient) - Swab, Nares [[id number]]  (Normal) Collected: 06/05/24 0310    Specimen: Swab from Nares Updated: 06/05/24 0730     MRSA PCR No MRSA Detected    Narrative:      The negative predictive value of this diagnostic test is high and should only be used to consider de-escalating anti-MRSA therapy. A positive result may indicate colonization with MRSA and must be correlated clinically.     "STAT Lactic Acid, Reflex [[id number]]  (Abnormal) Collected: 06/05/24 0202    Specimen: Blood Updated: 06/05/24 0242     Lactate 2.2 mmol/L     Procalcitonin [[id number]]  (Normal) Collected: 06/04/24 2235    Specimen: Blood Updated: 06/05/24 0139     Procalcitonin 0.21 ng/mL     Narrative:      As a Marker for Sepsis (Non-Neonates):    1. <0.5 ng/mL represents a low risk of severe sepsis and/or septic shock.  2. >2 ng/mL represents a high risk of severe sepsis and/or septic shock.    As a Marker for Lower Respiratory Tract Infections that require antibiotic therapy:    PCT on Admission    Antibiotic Therapy       6-12 Hrs later    >0.5                Strongly Recommended  >0.25 - <0.5        Recommended   0.1 - 0.25          Discouraged              Remeasure/reassess PCT  <0.1                Strongly Discouraged     Remeasure/reassess PCT    As 28 day mortality risk marker: \"Change in Procalcitonin Result\" (>80% or <=80%) if Day 0 (or Day 1) and Day 4 values are available. Refer to http://www.[employer]-pct-calculator.com    Change in PCT <=80%  A decrease of PCT levels below or equal to 80% defines a positive change in PCT test result representing a higher risk for 28-day all-cause mortality of patients diagnosed with severe sepsis for septic shock.    Change in PCT >80%  A decrease of PCT levels of more than 80% defines a negative change in PCT result representing a lower risk for 28-day all-cause mortality of patients diagnosed with severe sepsis or septic shock.       Urinalysis, Microscopic Only - Urine, Clean Catch [[id number]]  (Abnormal) Collected: 06/05/24 0008    Specimen: Urine, Clean Catch Updated: 06/05/24 0109     RBC, UA 3-5 /HPF      WBC, UA 0-2 /HPF      Bacteria, UA None Seen /HPF      Squamous Epithelial Cells, UA 3-6 /HPF      Yeast, UA Small/1+ Budding Yeast /HPF      Hyaline Casts, UA None Seen /LPF      Waxy Casts 0-2 /LPF      Methodology Automated Microscopy    Urinalysis With Microscopic If " Indicated (No Culture) - Urine, Clean Catch [[id number]]  (Abnormal) Collected: 06/05/24 0008    Specimen: Urine, Clean Catch Updated: 06/05/24 0045     Color, UA Yellow     Appearance, UA Clear     pH, UA 5.5     Specific Gravity, UA >1.030     Glucose, UA Negative     Ketones, UA Negative     Bilirubin, UA Negative     Blood, UA Large (3+)     Protein, UA Trace     Leuk Esterase, UA Trace     Nitrite, UA Negative     Urobilinogen, UA 1.0 E.U./dL    COVID-19, FLU A/B, RSV PCR 1 HR TAT - Swab, Nasopharynx [[id number]]  (Normal) Collected: 06/04/24 2328    Specimen: Swab from Nasopharynx Updated: 06/05/24 0029     COVID19 Not Detected     Influenza A PCR Not Detected     Influenza B PCR Not Detected     RSV, PCR Not Detected    Narrative:      Fact sheet for providers: https://www.fda.gov/media/707260/download    Fact sheet for patients: https://www.fda.gov/media/520396/download    Test performed by PCR.    Lactic Acid, Plasma [[id number]]  (Abnormal) Collected: 06/04/24 2235    Specimen: Blood Updated: 06/04/24 2319     Lactate 3.7 mmol/L     Comprehensive Metabolic Panel [[id number]]  (Abnormal) Collected: 06/04/24 2235    Specimen: Blood Updated: 06/04/24 2316     Glucose 107 mg/dL      BUN 8 mg/dL      Creatinine 0.71 mg/dL      Sodium 141 mmol/L      Potassium 3.9 mmol/L      Comment: Specimen hemolyzed.  Result may be falsely elevated.        Chloride 107 mmol/L      CO2 20.4 mmol/L      Calcium 9.2 mg/dL      Total Protein 7.0 g/dL      Albumin 4.1 g/dL      ALT (SGPT) 29 U/L      Comment: Specimen hemolyzed.  Result may  be falsely elevated.        AST (SGOT) 24 U/L      Comment: Specimen hemolyzed.  Result may be falsely elevated.        Alkaline Phosphatase 102 U/L      Total Bilirubin <0.2 mg/dL      Globulin 2.9 gm/dL      A/G Ratio 1.4 g/dL      BUN/Creatinine Ratio 11.3     Anion Gap 13.6 mmol/L      eGFR 109.7 mL/min/1.73     Narrative:      GFR Normal >60  Chronic Kidney Disease <60  Kidney Failure <15       Magnesium [[id number]]  (Normal) Collected: 06/04/24 2235    Specimen: Blood Updated: 06/04/24 2316     Magnesium 1.9 mg/dL     Lipase [[id number]]  (Normal) Collected: 06/04/24 2235    Specimen: Blood Updated: 06/04/24 2316     Lipase 41 U/L     Ethanol [[id number]] Collected: 06/04/24 2235    Specimen: Blood Updated: 06/04/24 2316     Ethanol <10 mg/dL      Ethanol % <0.010 %     CBC & Differential [[id number]]  (Abnormal) Collected: 06/04/24 2235    Specimen: Blood Updated: 06/04/24 2301    Narrative:      The following orders were created for panel order CBC & Differential.  Procedure                               Abnormality         Status                     ---------                               -----------         ------                     CBC Auto Differential[[id number]]        Abnormal            Final result                 Please view results for these tests on the individual orders.    CBC Auto Differential [[id number]]  (Abnormal) Collected: 06/04/24 2235    Specimen: Blood Updated: 06/04/24 2301     WBC 5.80 10*3/mm3      RBC 3.23 10*6/mm3      Hemoglobin 10.6 g/dL      Hematocrit 31.6 %      MCV 97.8 fL      MCH 32.8 pg      MCHC 33.5 g/dL      RDW 11.6 %      RDW-SD 41.7 fl      MPV 9.4 fL      Platelets 243 10*3/mm3      Neutrophil % 94.2 %      Lymphocyte % 4.3 %      Monocyte % 1.2 %      Eosinophil % 0.0 %      Basophil % 0.0 %      Immature Grans % 0.3 %      Neutrophils, Absolute 5.46 10*3/mm3      Lymphocytes, Absolute 0.25 10*3/mm3      Monocytes, Absolute 0.07 10*3/mm3      Eosinophils, Absolute 0.00 10*3/mm3      Basophils, Absolute 0.00 10*3/mm3      Immature Grans, Absolute 0.02 10*3/mm3      nRBC 0.0 /100 WBC     hCG, Serum, Qualitative [[id number]]  (Normal) Collected: 06/04/24 2235    Specimen: Blood Updated: 06/04/24 2259     HCG Qualitative Negative    Center Draw [[id number]] Collected: 06/04/24 2235    Specimen: Blood Updated: 06/04/24 2246    Narrative:      The following orders  were created for panel order [place] Draw.  Procedure                               Abnormality         Status                     ---------                               -----------         ------                     Green Top (Gel)[[id number]]                                  Final result               Lavender Top[[id number]]                                     Final result               Gold Top - SST[[id number]]                                                              Light Blue Top[[id number]]                                   Final result                 Please view results for these tests on the individual orders.    Green Top (Gel) [[id number]] Collected: 06/04/24 2235    Specimen: Blood Updated: 06/04/24 2246     Extra Tube Hold for add-ons.     Comment: Auto resulted.       Lavender Top [[id number]] Collected: 06/04/24 2235    Specimen: Blood Updated: 06/04/24 2246     Extra Tube hold for add-on     Comment: Auto resulted       Light Blue Top [[id number]] Collected: 06/04/24 2235    Specimen: Blood Updated: 06/04/24 2246     Extra Tube Hold for add-ons.     Comment: Auto resulted       Blood Culture - Blood, Arm, Right [[id number]] Collected: 06/04/24 2237    Specimen: Blood from Arm, Right Updated: 06/04/24 2244    Blood Culture - Blood, Arm, Left [[id number]] Collected: 06/04/24 2237    Specimen: Blood from Arm, Left Updated: 06/04/24 2244                     Imaging Results (All)       Procedure Component Value Units Date/Time    CT Abdomen Pelvis With Contrast [[id number]] Collected: 06/05/24 0001     Updated: 06/05/24 0001    Narrative:        Patient: ASHOK HUIZAR  Time Out: 00:01  Exam(s): CT ABDOMEN + PELVIS With Contrast     EXAM:  CT Abdomen and Pelvis With Intravenous Contrast    CLINICAL HISTORY:  Reason for exam: generalized abdominal pain, nausea, vomiting, fever,   tachycardia.    TECHNIQUE:  Axial computed tomography images of the abdomen and pelvis with   intravenous contrast.  CTDI is 30 mGy  and DLP is 1409 mGy-cm.  This CT   exam was performed according to the principle of ALARA (As Low As   Reasonably Achievable) by using one or more of the following dose   reduction techniques: automated exposure control, adjustment of the mA   and or kV according to patient size, and or use of iterative   reconstruction technique.    COMPARISON:  No relevant prior studies available.    FINDINGS:  Lung bases:  Unremarkable.  No mass.  No consolidation.    ABDOMEN:  Liver:  Hepatomegaly.  Gallbladder and bile ducts:  Cholecystectomy.  No ductal dilation.  Pancreas:  Unremarkable.  No mass.  No ductal dilation.  Spleen:  Unremarkable.  No splenomegaly.  Adrenals:  Unremarkable.  No mass.  Kidneys and ureters:  Unremarkable.  No solid mass.  No hydronephrosis.  Stomach and bowel:  Unremarkable.  No mucosal thickening.  No bowel   obstruction.    PELVIS:  Appendix:  Normal appendix.  Bladder:  Unremarkable.  No mass.  Reproductive:  Previous tubal ligation.  Right tubal ligation clip is   appropriately positioned.  Left tubal ligation clip has migrated into the   anterior pelvic cavity.  Unremarkable uterus.    ABDOMEN and PELVIS:  Intraperitoneal space:  Unremarkable.  No free air, significant free   fluid, or fluid collection.  Bones joints:  No acute fracture.  No dislocation.  Soft tissues: Fat-containing periumbilical hernia.  Vasculature:  Unremarkable.  No abdominal aortic aneurysm.  Lymph nodes:  Unremarkable.  No enlarged lymph nodes.    IMPRESSION:       No acute findings in the abdomen or pelvis.      Impression:          Electronically signed by Indra Segovia M.D. on 06-05-24 at 0001    XR Chest 1 View [[id number]] Collected: 06/04/24 2353     Updated: 06/04/24 2353    Narrative:        Patient: ASHOK HUIZAR  Time Out: 23:52  Exam(s): XR CXR 1 VIEW     EXAM:  XR Chest, 1 View    CLINICAL HISTORY:  Reason for exam: cough, fever, chills, n v.    TECHNIQUE:  Frontal view of the chest.    COMPARISON:  No relevant  prior studies available.    FINDINGS:  Lungs:  Unremarkable.  No consolidation.  Pleural space:  Unremarkable.  No pleural effusion or pneumothorax.  Heart:  Unremarkable.  No cardiomegaly or pulmonary vascular congestion.  Bones joints:  No acute fracture.  No dislocation.    IMPRESSION:       No evidence of acute cardiopulmonary disease.      Impression:          Electronically signed by Indra Segovia M.D. on 06-04-24 at 2352          Past Medical History:   Diagnosis Date    Abnormal Pap smear of cervix     Migraine     Seizure disorder        Assessment:  Active Hospital Problems    Diagnosis  POA    **Sepsis [A41.9]  Yes    Acute lactic acidosis [E87.21]  Unknown    Cellulitis [L03.90]  Unknown    Migraine [G43.909]  Unknown    Seizure disorder [G40.909]  Unknown    Allergy to sulfa drugs [Z88.2]  Not Applicable    Drug induced fever [R50.2]  Unknown    Dermatitis, unspecified, on abdominal wall [L30.9]  Unknown      Resolved Hospital Problems   No resolved problems to display.       Plan:  The patient is admitted to the hospital for further evaluation treatment with concern for sepsis.  Sounds like she has probably had an allergic reaction to the Bactrim and likely has drug fever.  Cultures were done and she started on broad-spectrum antibiotics.  I am going to give her a dose of Solu-Medrol and ask for infectious disease consult.  Will get some follow-up lab and follow-up on cultures.  I suggest she avoid sulfa drugs in the future.    Nik Muhammad MD  6/5/2024  17:08 EDT

## 2024-06-05 NOTE — ED PROVIDER NOTES
Date seen: 6/5/2024        History provided by: Patient        Chief Complaints: Nausea and vomiting    HPI:  Pt is a 39 y.o. female with history of alcohol abuse who presents for nausea and vomiting with fever, chills, generalized bodyaches, headache, and abdominal cramping that began this evening around 6:30-7:00 PM.  Patient states she has also had a slight cough.  States she felt okay this morning.  Denies known sick contacts.  States she was just prescribed Bactrim for abdominal wall cellulitis and she took a dose around 6 PM, had not eaten much prior to taking it, and then went and ate at [employer] and had 1 piece of quesadilla and then started to feel ill.  Denies chest pain, shortness of breath, diarrhea, urinary symptoms, sore throat.  Admits to drinking 3-4 shots of liquor today.          Medical Record Review:  Reviewed pelvic ultrasound from 5/13/2022  IMPRESSION:   1. Normal ultrasound appearance of uterus, uterine endometrial echo complex and ovaries.       PAST MEDICAL HISTORY  Active Ambulatory Problems     Diagnosis Date Noted    No Active Ambulatory Problems     Resolved Ambulatory Problems     Diagnosis Date Noted    No Resolved Ambulatory Problems     Past Medical History:   Diagnosis Date    Abnormal Pap smear of cervix     Migraine     Seizure disorder          PAST SURGICAL HISTORY  Past Surgical History:   Procedure Laterality Date    CEREBRAL ANEURYSM REPAIR           FAMILY HISTORY  Family History   Problem Relation Age of Onset    Melanoma Father     Diabetes Father     Pancreatic cancer Paternal Grandmother     Diabetes Paternal Grandmother     Diabetes Maternal Grandmother          SOCIAL HISTORY  Social History     Socioeconomic History    Marital status: Single   Tobacco Use    Smoking status: Light Smoker   Substance and Sexual Activity    Drug use: Yes     Types: Marijuana    Sexual activity: Yes         ALLERGIES  Prednisone            PHYSICAL EXAM  ED Triage Vitals [06/04/24 2209]    Temp Heart Rate Resp BP SpO2   (!) 102.2 °F (39 °C) (!) 136 18 (!) 195/117 92 %      Temp src Heart Rate Source Patient Position BP Location FiO2 (%)   Tympanic Monitor Sitting Right arm --       Physical Exam  Vitals reviewed.   Constitutional:       General: She is not in acute distress.     Appearance: Normal appearance. She is ill-appearing.   HENT:      Head: Normocephalic.      Nose: Nose normal.      Mouth/Throat:      Mouth: Mucous membranes are moist.   Eyes:      Extraocular Movements: Extraocular movements intact.      Conjunctiva/sclera: Conjunctivae normal.   Cardiovascular:      Rate and Rhythm: Regular rhythm. Tachycardia present.      Pulses: Normal pulses.   Pulmonary:      Effort: Pulmonary effort is normal. No respiratory distress.      Breath sounds: Normal breath sounds.   Abdominal:      General: There is no distension.      Palpations: Abdomen is soft.      Tenderness: There is no abdominal tenderness. There is no guarding.   Musculoskeletal:         General: No swelling or deformity. Normal range of motion.      Cervical back: Normal range of motion. No rigidity.      Right lower leg: No edema.      Left lower leg: No edema.   Skin:     General: Skin is warm.   Neurological:      General: No focal deficit present.      Mental Status: She is alert and oriented to person, place, and time.   Psychiatric:         Mood and Affect: Mood normal.         Vital signs and nursing notes reviewed.            Differential Diagnosis includes but not limited to: COVID-19, influenza, pneumonia, gastroenteritis          LAB RESULTS  Recent Results (from the past 24 hour(s))   Comprehensive Metabolic Panel    Collection Time: 06/04/24 10:35 PM    Specimen: Blood   Result Value Ref Range    Glucose 107 (H) 65 - 99 mg/dL    BUN 8 6 - 20 mg/dL    Creatinine 0.71 0.57 - 1.00 mg/dL    Sodium 141 136 - 145 mmol/L    Potassium 3.9 3.5 - 5.2 mmol/L    Chloride 107 98 - 107 mmol/L    CO2 20.4 (L) 22.0 - 29.0 mmol/L     Calcium 9.2 8.6 - 10.5 mg/dL    Total Protein 7.0 6.0 - 8.5 g/dL    Albumin 4.1 3.5 - 5.2 g/dL    ALT (SGPT) 29 1 - 33 U/L    AST (SGOT) 24 1 - 32 U/L    Alkaline Phosphatase 102 39 - 117 U/L    Total Bilirubin <0.2 0.0 - 1.2 mg/dL    Globulin 2.9 gm/dL    A/G Ratio 1.4 g/dL    BUN/Creatinine Ratio 11.3 7.0 - 25.0    Anion Gap 13.6 5.0 - 15.0 mmol/L    eGFR 109.7 >60.0 mL/min/1.73   Lipase    Collection Time: 06/04/24 10:35 PM    Specimen: Blood   Result Value Ref Range    Lipase 41 13 - 60 U/L   Green Top (Gel)    Collection Time: 06/04/24 10:35 PM   Result Value Ref Range    Extra Tube Hold for add-ons.    Lavender Top    Collection Time: 06/04/24 10:35 PM   Result Value Ref Range    Extra Tube hold for add-on    Light Blue Top    Collection Time: 06/04/24 10:35 PM   Result Value Ref Range    Extra Tube Hold for add-ons.    CBC Auto Differential    Collection Time: 06/04/24 10:35 PM    Specimen: Blood   Result Value Ref Range    WBC 5.80 3.40 - 10.80 10*3/mm3    RBC 3.23 (L) 3.77 - 5.28 10*6/mm3    Hemoglobin 10.6 (L) 12.0 - 15.9 g/dL    Hematocrit 31.6 (L) 34.0 - 46.6 %    MCV 97.8 (H) 79.0 - 97.0 fL    MCH 32.8 26.6 - 33.0 pg    MCHC 33.5 31.5 - 35.7 g/dL    RDW 11.6 (L) 12.3 - 15.4 %    RDW-SD 41.7 37.0 - 54.0 fl    MPV 9.4 6.0 - 12.0 fL    Platelets 243 140 - 450 10*3/mm3    Neutrophil % 94.2 (H) 42.7 - 76.0 %    Lymphocyte % 4.3 (L) 19.6 - 45.3 %    Monocyte % 1.2 (L) 5.0 - 12.0 %    Eosinophil % 0.0 (L) 0.3 - 6.2 %    Basophil % 0.0 0.0 - 1.5 %    Immature Grans % 0.3 0.0 - 0.5 %    Neutrophils, Absolute 5.46 1.70 - 7.00 10*3/mm3    Lymphocytes, Absolute 0.25 (L) 0.70 - 3.10 10*3/mm3    Monocytes, Absolute 0.07 (L) 0.10 - 0.90 10*3/mm3    Eosinophils, Absolute 0.00 0.00 - 0.40 10*3/mm3    Basophils, Absolute 0.00 0.00 - 0.20 10*3/mm3    Immature Grans, Absolute 0.02 0.00 - 0.05 10*3/mm3    nRBC 0.0 0.0 - 0.2 /100 WBC   hCG, Serum, Qualitative    Collection Time: 06/04/24 10:35 PM    Specimen: Blood    Result Value Ref Range    HCG Qualitative Negative Negative   Lactic Acid, Plasma    Collection Time: 06/04/24 10:35 PM    Specimen: Blood   Result Value Ref Range    Lactate 3.7 (C) 0.5 - 2.0 mmol/L   Magnesium    Collection Time: 06/04/24 10:35 PM    Specimen: Blood   Result Value Ref Range    Magnesium 1.9 1.6 - 2.6 mg/dL   Ethanol    Collection Time: 06/04/24 10:35 PM    Specimen: Blood   Result Value Ref Range    Ethanol <10 0 - 10 mg/dL    Ethanol % <0.010 %   Procalcitonin    Collection Time: 06/04/24 10:35 PM    Specimen: Blood   Result Value Ref Range    Procalcitonin 0.21 0.00 - 0.25 ng/mL   COVID-19, FLU A/B, RSV PCR 1 HR TAT - Swab, Nasopharynx    Collection Time: 06/04/24 11:28 PM    Specimen: Nasopharynx; Swab   Result Value Ref Range    COVID19 Not Detected Not Detected - Ref. Range    Influenza A PCR Not Detected Not Detected    Influenza B PCR Not Detected Not Detected    RSV, PCR Not Detected Not Detected   Urinalysis With Microscopic If Indicated (No Culture) - Urine, Clean Catch    Collection Time: 06/05/24 12:08 AM    Specimen: Urine, Clean Catch   Result Value Ref Range    Color, UA Yellow Yellow, Straw    Appearance, UA Clear Clear    pH, UA 5.5 5.0 - 8.0    Specific Gravity, UA >1.030 (H) 1.005 - 1.030    Glucose, UA Negative Negative    Ketones, UA Negative Negative    Bilirubin, UA Negative Negative    Blood, UA Large (3+) (A) Negative    Protein, UA Trace (A) Negative    Leuk Esterase, UA Trace (A) Negative    Nitrite, UA Negative Negative    Urobilinogen, UA 1.0 E.U./dL 0.2 - 1.0 E.U./dL   Urinalysis, Microscopic Only - Urine, Clean Catch    Collection Time: 06/05/24 12:08 AM    Specimen: Urine, Clean Catch   Result Value Ref Range    RBC, UA 3-5 (A) None Seen, 0-2 /HPF    WBC, UA 0-2 None Seen, 0-2 /HPF    Bacteria, UA None Seen None Seen /HPF    Squamous Epithelial Cells, UA 3-6 (A) None Seen, 0-2 /HPF    Yeast, UA Small/1+ Budding Yeast None Seen /HPF    Hyaline Casts, UA None Seen  None Seen /LPF    Waxy Casts 0-2 None Seen /LPF    Methodology Automated Microscopy        Ordered the above labs and reviewed the results.          RADIOLOGY  CT Abdomen Pelvis With Contrast    Result Date: 6/5/2024  Patient: ASHOK HUIZAR  Time Out: 00:01 Exam(s): CT ABDOMEN + PELVIS With Contrast EXAM: CT Abdomen and Pelvis With Intravenous Contrast CLINICAL HISTORY: Reason for exam: generalized abdominal pain, nausea, vomiting, fever, tachycardia. TECHNIQUE: Axial computed tomography images of the abdomen and pelvis with intravenous contrast.  CTDI is 30 mGy and DLP is 1409 mGy-cm.  This CT exam was performed according to the principle of ALARA (As Low As Reasonably Achievable) by using one or more of the following dose reduction techniques: automated exposure control, adjustment of the mA and or kV according to patient size, and or use of iterative reconstruction technique. COMPARISON: No relevant prior studies available. FINDINGS: Lung bases:  Unremarkable.  No mass.  No consolidation. ABDOMEN: Liver:  Hepatomegaly. Gallbladder and bile ducts:  Cholecystectomy.  No ductal dilation. Pancreas:  Unremarkable.  No mass.  No ductal dilation. Spleen:  Unremarkable.  No splenomegaly. Adrenals:  Unremarkable.  No mass. Kidneys and ureters:  Unremarkable.  No solid mass.  No hydronephrosis. Stomach and bowel:  Unremarkable.  No mucosal thickening.  No bowel obstruction. PELVIS: Appendix:  Normal appendix. Bladder:  Unremarkable.  No mass. Reproductive:  Previous tubal ligation.  Right tubal ligation clip is appropriately positioned.  Left tubal ligation clip has migrated into the anterior pelvic cavity.  Unremarkable uterus. ABDOMEN and PELVIS: Intraperitoneal space:  Unremarkable.  No free air, significant free fluid, or fluid collection. Bones joints:  No acute fracture.  No dislocation. Soft tissues: Fat-containing periumbilical hernia. Vasculature:  Unremarkable.  No abdominal aortic aneurysm. Lymph nodes:   Unremarkable.  No enlarged lymph nodes. IMPRESSION:     No acute findings in the abdomen or pelvis.     Electronically signed by Indra Segovia M.D. on 06-05-24 at 0001    XR Chest 1 View    Result Date: 6/4/2024  Patient: ASHOK HUIZAR  Time Out: 23:52 Exam(s): XR CXR 1 VIEW EXAM: XR Chest, 1 View CLINICAL HISTORY: Reason for exam: cough, fever, chills, n v. TECHNIQUE: Frontal view of the chest. COMPARISON: No relevant prior studies available. FINDINGS: Lungs:  Unremarkable.  No consolidation. Pleural space:  Unremarkable.  No pleural effusion or pneumothorax. Heart:  Unremarkable.  No cardiomegaly or pulmonary vascular congestion. Bones joints:  No acute fracture.  No dislocation. IMPRESSION:     No evidence of acute cardiopulmonary disease.     Electronically signed by Indra Segovia M.D. on 06-04-24 at 2352     Ordered the above noted radiological studies. Reviewed by me in PACS.      - My independent interpretation of CXR:  no pneumothorax                  Medications given in ED:   Medications   sodium chloride 0.9 % flush 10 mL (has no administration in time range)   lactated ringers bolus 1,000 mL (has no administration in time range)   Pharmacy to dose vancomycin (has no administration in time range)   vancomycin 2250 mg/500 mL 0.9% NS IVPB ([facility]) (has no administration in time range)   Vancomycin HCl 1,250 mg in sodium chloride 0.9 % 250 mL VTB (has no administration in time range)   ondansetron (ZOFRAN) injection 4 mg (4 mg Intravenous Given 6/4/24 2242)   acetaminophen (TYLENOL) tablet 1,000 mg (1,000 mg Oral Given 6/4/24 2241)   lactated ringers bolus 1,000 mL (1,000 mL Intravenous New Bag 6/4/24 2330)   iopamidol (ISOVUE-300) 61 % injection 100 mL (85 mL Intravenous Given 6/4/24 2321)   morphine injection 4 mg (4 mg Intravenous Given 6/5/24 0013)   ondansetron (ZOFRAN) injection 4 mg (4 mg Intravenous Given 6/5/24 0013)   piperacillin-tazobactam (ZOSYN) 3.375 g IVPB in 100 mL NS MBP (CD) (0 g Intravenous  Stopped 6/5/24 0152)             Procedures:   Procedures                     Progress Notes/ED Course:   ED Course as of 06/05/24 0158   Tue Jun 04, 2024   2330 I spoke with Dr. Nelson about patient, agrees with plan of care.   [SA]   Wed Jun 05, 2024   0008 CT Abdomen Pelvis With Contrast [SA]   0008 XR Chest 1 View  CT abdomen pelvis and chest x-ray negative for acute findings, no infectious process identified at this time.  Patient meeting severe sepsis criteria.  Lactic acid 3.7.  White blood cell count within normal limits.  Urinalysis and COVID-19 swab pending at this time.  Will start broad-spectrum antibiotics. [SA]   0157 Pt re-checked.  Tested negative for COVID-19, influenza, RSV.  UA negative for acute infectious process.  Will admit for severe sepsis presumably abdominal wall cellulitis which she was being treated for outpatient with Bactrim.  Labs significant for lactic acidosis, presented febrile with tachycardia.  Temperature and heart rate improving with Tylenol and IV fluids.  Blood pressure stable.  Discussed need for admission, reviewed treatment plan and reason for admission with pt/family and admitting physician.  Pt/family voiced understanding of the plan for admission for further testing/treatment as needed.    [SA]      ED Course User Index  [SA] Oliva Reynaga PA-C                 Critical care excluding treating other patients and other billable procedures, 16 minutes.   My critical care time is exclusively of critical care time provided by MD and did not run concurrent.               DIAGNOSIS  Final diagnoses:   Sepsis without acute organ dysfunction, due to unspecified organism   Cellulitis of other specified site   Acute lactic acidosis                 Disposition:   ED Disposition       ED Disposition   Decision to Admit    Condition   --    Comment   Level of Care: Telemetry [5]   Diagnosis: Sepsis [[id number]]   Admitting Physician: DENISE PEREZ [[id number]]   Attending Physician:  DENISE PEREZ [[id number]]   Certification: I Certify That Inpatient Hospital Services Are Medically Necessary For Greater Than 2 Midnights                       Latest Documented Vital Signs:  As of 01:58 EDT  BP- 114/69 HR- 116 Temp- (!) 101.1 °F (38.4 °C) (Oral) O2 sat- 91%      --        Provider Attestation:   I personally reviewed the past medical history, past surgical history, social history, family history, current medications, and allergies as they appear in the chart.    The patient was seen and examined by myself and Dr. Nelson who agrees with plan.      Please note that portions of this were completed with a voice recognition program.     Note Disclaimer: At [facility], we believe that sharing information builds trust and better relationships. You are receiving this note because you are receiving care at [facility] or recently visited. It is possible you will see health information before a provider has talked with you about it. This kind of information can be easy to misunderstand. To help you fully understand what it means for your health, we urge you to discuss this note with your provider.        Provider note signed by:       Oliva Reynaga PA-C  06/05/24 0159

## 2024-06-06 LAB
ALBUMIN SERPL-MCNC: 3.4 G/DL (ref 3.5–5.2)
ALBUMIN/GLOB SERPL: 1.3 G/DL
ALP SERPL-CCNC: 115 U/L (ref 39–117)
ALT SERPL W P-5'-P-CCNC: 77 U/L (ref 1–33)
ANION GAP SERPL CALCULATED.3IONS-SCNC: 9.1 MMOL/L (ref 5–15)
AST SERPL-CCNC: 31 U/L (ref 1–32)
BASOPHILS # BLD AUTO: 0 10*3/MM3 (ref 0–0.2)
BASOPHILS NFR BLD AUTO: 0 % (ref 0–1.5)
BILIRUB SERPL-MCNC: 0.3 MG/DL (ref 0–1.2)
BUN SERPL-MCNC: 7 MG/DL (ref 6–20)
BUN/CREAT SERPL: 9.9 (ref 7–25)
CALCIUM SPEC-SCNC: 8.9 MG/DL (ref 8.6–10.5)
CHLORIDE SERPL-SCNC: 111 MMOL/L (ref 98–107)
CO2 SERPL-SCNC: 18.9 MMOL/L (ref 22–29)
CREAT SERPL-MCNC: 0.71 MG/DL (ref 0.57–1)
DEPRECATED RDW RBC AUTO: 42.7 FL (ref 37–54)
EGFRCR SERPLBLD CKD-EPI 2021: 109.7 ML/MIN/1.73
EOSINOPHIL # BLD AUTO: 0.01 10*3/MM3 (ref 0–0.4)
EOSINOPHIL NFR BLD AUTO: 0.1 % (ref 0.3–6.2)
ERYTHROCYTE [DISTWIDTH] IN BLOOD BY AUTOMATED COUNT: 11.9 % (ref 12.3–15.4)
GLOBULIN UR ELPH-MCNC: 2.7 GM/DL
GLUCOSE SERPL-MCNC: 148 MG/DL (ref 65–99)
HCT VFR BLD AUTO: 38.4 % (ref 34–46.6)
HGB BLD-MCNC: 12.7 G/DL (ref 12–15.9)
IMM GRANULOCYTES # BLD AUTO: 0.04 10*3/MM3 (ref 0–0.05)
IMM GRANULOCYTES NFR BLD AUTO: 0.5 % (ref 0–0.5)
LYMPHOCYTES # BLD AUTO: 0.4 10*3/MM3 (ref 0.7–3.1)
LYMPHOCYTES NFR BLD AUTO: 4.7 % (ref 19.6–45.3)
MCH RBC QN AUTO: 32.7 PG (ref 26.6–33)
MCHC RBC AUTO-ENTMCNC: 33.1 G/DL (ref 31.5–35.7)
MCV RBC AUTO: 99 FL (ref 79–97)
MONOCYTES # BLD AUTO: 0.1 10*3/MM3 (ref 0.1–0.9)
MONOCYTES NFR BLD AUTO: 1.2 % (ref 5–12)
NEUTROPHILS NFR BLD AUTO: 7.98 10*3/MM3 (ref 1.7–7)
NEUTROPHILS NFR BLD AUTO: 93.5 % (ref 42.7–76)
NRBC BLD AUTO-RTO: 0 /100 WBC (ref 0–0.2)
PLATELET # BLD AUTO: 253 10*3/MM3 (ref 140–450)
PMV BLD AUTO: 9.7 FL (ref 6–12)
POTASSIUM SERPL-SCNC: 4.1 MMOL/L (ref 3.5–5.2)
PROT SERPL-MCNC: 6.1 G/DL (ref 6–8.5)
RBC # BLD AUTO: 3.88 10*6/MM3 (ref 3.77–5.28)
SODIUM SERPL-SCNC: 139 MMOL/L (ref 136–145)
WBC NRBC COR # BLD AUTO: 8.53 10*3/MM3 (ref 3.4–10.8)

## 2024-06-06 PROCEDURE — 25010000002 PROCHLORPERAZINE 10 MG/2ML SOLUTION: Performed by: PSYCHIATRY & NEUROLOGY

## 2024-06-06 PROCEDURE — 63710000001 DIPHENHYDRAMINE PER 50 MG: Performed by: PSYCHIATRY & NEUROLOGY

## 2024-06-06 PROCEDURE — 96376 TX/PRO/DX INJ SAME DRUG ADON: CPT

## 2024-06-06 PROCEDURE — G0378 HOSPITAL OBSERVATION PER HR: HCPCS

## 2024-06-06 PROCEDURE — 25010000002 CEFAZOLIN PER 500 MG: Performed by: INTERNAL MEDICINE

## 2024-06-06 PROCEDURE — 25010000002 THIAMINE HCL 200 MG/2ML SOLUTION: Performed by: HOSPITALIST

## 2024-06-06 PROCEDURE — 25810000003 SODIUM CHLORIDE 0.9 % SOLUTION 250 ML FLEX CONT: Performed by: NURSE PRACTITIONER

## 2024-06-06 PROCEDURE — 80053 COMPREHEN METABOLIC PANEL: CPT | Performed by: HOSPITALIST

## 2024-06-06 PROCEDURE — 25010000002 METHYLPREDNISOLONE PER 125 MG: Performed by: HOSPITALIST

## 2024-06-06 PROCEDURE — 25010000002 VANCOMYCIN HCL 1.25 G RECONSTITUTED SOLUTION 1 EACH VIAL: Performed by: NURSE PRACTITIONER

## 2024-06-06 PROCEDURE — 99222 1ST HOSP IP/OBS MODERATE 55: CPT | Performed by: PSYCHIATRY & NEUROLOGY

## 2024-06-06 PROCEDURE — 25010000002 PIPERACILLIN SOD-TAZOBACTAM PER 1 G: Performed by: NURSE PRACTITIONER

## 2024-06-06 PROCEDURE — 85025 COMPLETE CBC W/AUTO DIFF WBC: CPT | Performed by: HOSPITALIST

## 2024-06-06 PROCEDURE — 25010000002 MAGNESIUM SULFATE IN D5W 1G/100ML (PREMIX) 1-5 GM/100ML-% SOLUTION: Performed by: PSYCHIATRY & NEUROLOGY

## 2024-06-06 RX ORDER — FLUCONAZOLE 200 MG/1
200 TABLET ORAL EVERY 24 HOURS
Status: DISCONTINUED | OUTPATIENT
Start: 2024-06-06 | End: 2024-06-07 | Stop reason: HOSPADM

## 2024-06-06 RX ORDER — METHYLPREDNISOLONE SODIUM SUCCINATE 125 MG/2ML
125 INJECTION, POWDER, LYOPHILIZED, FOR SOLUTION INTRAMUSCULAR; INTRAVENOUS ONCE
Status: COMPLETED | OUTPATIENT
Start: 2024-06-06 | End: 2024-06-06

## 2024-06-06 RX ORDER — DIPHENHYDRAMINE HCL 25 MG
25 CAPSULE ORAL EVERY 8 HOURS
Status: COMPLETED | OUTPATIENT
Start: 2024-06-06 | End: 2024-06-07

## 2024-06-06 RX ORDER — PROCHLORPERAZINE EDISYLATE 5 MG/ML
10 INJECTION INTRAMUSCULAR; INTRAVENOUS EVERY 8 HOURS
Status: COMPLETED | OUTPATIENT
Start: 2024-06-06 | End: 2024-06-07

## 2024-06-06 RX ORDER — MAGNESIUM SULFATE 1 G/100ML
1 INJECTION INTRAVENOUS ONCE
Status: COMPLETED | OUTPATIENT
Start: 2024-06-06 | End: 2024-06-06

## 2024-06-06 RX ADMIN — LEVETIRACETAM 1000 MG: 500 TABLET, FILM COATED ORAL at 09:10

## 2024-06-06 RX ADMIN — THIAMINE HYDROCHLORIDE 200 MG: 100 INJECTION, SOLUTION INTRAMUSCULAR; INTRAVENOUS at 21:18

## 2024-06-06 RX ADMIN — Medication 10 ML: at 09:13

## 2024-06-06 RX ADMIN — FOLIC ACID 1 MG: 1 TABLET ORAL at 09:10

## 2024-06-06 RX ADMIN — LEVETIRACETAM 1000 MG: 500 TABLET, FILM COATED ORAL at 21:18

## 2024-06-06 RX ADMIN — PROCHLORPERAZINE EDISYLATE 10 MG: 5 INJECTION INTRAMUSCULAR; INTRAVENOUS at 16:13

## 2024-06-06 RX ADMIN — BUTALBITAL, ACETAMINOPHEN, AND CAFFEINE 1 TABLET: 50; 325; 40 TABLET ORAL at 02:10

## 2024-06-06 RX ADMIN — PIPERACILLIN AND TAZOBACTAM 4.5 G: 4; .5 INJECTION, POWDER, FOR SOLUTION INTRAVENOUS at 00:09

## 2024-06-06 RX ADMIN — MAGNESIUM SULFATE IN DEXTROSE 1 G: 10 INJECTION, SOLUTION INTRAVENOUS at 16:14

## 2024-06-06 RX ADMIN — FLUCONAZOLE 200 MG: 200 TABLET ORAL at 10:55

## 2024-06-06 RX ADMIN — CEFAZOLIN 2000 MG: 2 INJECTION, POWDER, FOR SOLUTION INTRAVENOUS at 09:11

## 2024-06-06 RX ADMIN — DIPHENHYDRAMINE HYDROCHLORIDE 25 MG: 25 CAPSULE ORAL at 16:13

## 2024-06-06 RX ADMIN — THIAMINE HYDROCHLORIDE 200 MG: 100 INJECTION, SOLUTION INTRAMUSCULAR; INTRAVENOUS at 05:09

## 2024-06-06 RX ADMIN — NYSTATIN, TRIAMCINOLONE ACETONIDE 1 APPLICATION: 100000; 1 CREAM TOPICAL at 21:18

## 2024-06-06 RX ADMIN — NICOTINE 1 PATCH: 21 PATCH, EXTENDED RELEASE TRANSDERMAL at 09:13

## 2024-06-06 RX ADMIN — CEFAZOLIN 2000 MG: 2 INJECTION, POWDER, FOR SOLUTION INTRAVENOUS at 16:14

## 2024-06-06 RX ADMIN — SODIUM CHLORIDE 1250 MG: 9 INJECTION, SOLUTION INTRAVENOUS at 00:37

## 2024-06-06 RX ADMIN — NYSTATIN, TRIAMCINOLONE ACETONIDE 1 APPLICATION: 100000; 1 CREAM TOPICAL at 15:11

## 2024-06-06 RX ADMIN — HYDROCODONE BITARTRATE AND ACETAMINOPHEN 1 TABLET: 5; 325 TABLET ORAL at 07:43

## 2024-06-06 RX ADMIN — Medication 10 ML: at 21:19

## 2024-06-06 RX ADMIN — THIAMINE HYDROCHLORIDE 200 MG: 100 INJECTION, SOLUTION INTRAMUSCULAR; INTRAVENOUS at 15:11

## 2024-06-06 RX ADMIN — METHYLPREDNISOLONE SODIUM SUCCINATE 125 MG: 125 INJECTION INTRAMUSCULAR; INTRAVENOUS at 16:13

## 2024-06-06 RX ADMIN — ANTACID TABLETS 2 TABLET: 500 TABLET, CHEWABLE ORAL at 21:32

## 2024-06-06 NOTE — CONSULTS
"Neurology Consult Note    Consult Date: 6/6/2024    Referring MD: Eleazar Nelson MD    Reason for Consult I have been asked to see the patient in neurological consultation to render advice and opinion regarding migraine    Magdalena Carreon is a 39 y.o. 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  Last seizure 2 years ago.  She also has chronic migraines which recently have been worse with persistent occipital headache and associated light sensitivity.  She is admitted for cellulitis which is being treated with antibiotics.  We were consulted for persistent migraine.  She reports that her headache is a bit positional and occasionally feels worse with lying down.  She denies any unilateral weakness or numbness but does endorse an odd yawning like sensation at times.    Past Medical History:   Diagnosis Date    Abnormal Pap smear of cervix     Migraine     Seizure disorder        Exam  /83 (BP Location: Right arm, Patient Position: Lying)   Pulse 86   Temp 98.1 °F (36.7 °C) (Oral)   Resp 18   Ht 157.2 cm (61.89\")   Wt 118 kg (260 lb)   LMP 04/29/2024 (Approximate)   SpO2 94%   Breastfeeding No   BMI 47.72 kg/m²   Gen: NAD, vitals reviewed  Eyes: Fundi normal  HEENT: No tenderness over the occipital nerves bilaterally  MS: oriented x3, recent/remote memory intact, normal attention/concentration, language intact, no neglect.  CN: visual acuity grossly normal, PERRL, EOMI, no facial droop, no dysarthria  Motor: 5/5 throughout upper and lower extremities, normal tone    DATA:    Lab Results   Component Value Date    GLUCOSE 148 (H) 06/06/2024    CALCIUM 8.9 06/06/2024     06/06/2024    K 4.1 06/06/2024    CO2 18.9 (L) 06/06/2024     (H) 06/06/2024    BUN 7 06/06/2024    CREATININE 0.71 06/06/2024    EGFRIFAFRI >60 01/12/2022    BCR 9.9 06/06/2024    ANIONGAP 9.1 06/06/2024     Lab Results   Component Value Date    " WBC 8.53 06/06/2024    HGB 12.7 06/06/2024    HCT 38.4 06/06/2024    MCV 99.0 (H) 06/06/2024     06/06/2024       Lab review: CBC, BMP unremarkable    Imaging review: CT venogram ordered    Diagnoses:  Chronic migraine without aura, without status migrainosus, not intractable, with new occipital component of headache  Cellulitis    Comment: Chronic migraine with current exacerbation and persistent occipital headache which is a change in the character of her usual headache.  Her headache does have somewhat of a positional quality which raises the question of cerebral venous sinus thrombosis.  I would recommend checking a CT venogram and also continuing to treat for migraine.  Fundus exam is normal which essentially rules out pseudotumor.  I think LP can be deferred for now.    Incidentally she has fairly stable epilepsy, last seizure 2 years ago.  Her neurologist retired and she needs to reestablish care.    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  May have some bearing on chronic headaches

## 2024-06-06 NOTE — PROGRESS NOTES
"DAILY PROGRESS NOTE  [facility]    Patient Identification:  Name: Magdalena Carreon  Age: 39 y.o.  Sex: female  :  1985  MRN: [id number]         Primary Care Physician: Sonja Broussard MD    Subjective:  Interval History: The patient is feeling better today but still complaining of headaches.  Her temperature is down and she still has rash on her abdomen and is a little bit flushed.    Objective:    Scheduled Meds:ceFAZolin, 2,000 mg, Intravenous, Q8H  fluconazole, 200 mg, Oral, Q24H  folic acid, 1 mg, Oral, Daily  lactated ringers, 1,000 mL, Intravenous, Once  levETIRAcetam, 1,000 mg, Oral, BID  nicotine, 1 patch, Transdermal, Q24H  nystatin-triamcinolone, 1 Application, Topical, Q12H  sodium chloride, 10 mL, Intravenous, Q12H  thiamine (B-1) IV, 200 mg, Intravenous, Q8H   Followed by  [START ON 6/10/2024] thiamine, 100 mg, Oral, Daily      Continuous Infusions:sodium chloride, 100 mL/hr, Last Rate: 100 mL/hr (24 1714)        Vital signs in last 24 hours:  Temp:  [97.9 °F (36.6 °C)-101.3 °F (38.5 °C)] 97.9 °F (36.6 °C)  Heart Rate:  [] 86  Resp:  [18] 18  BP: (101-127)/(50-98) 127/98    Intake/Output:    Intake/Output Summary (Last 24 hours) at 2024 1158  Last data filed at 2024 0923  Gross per 24 hour   Intake 1018 ml   Output --   Net 1018 ml       Exam:  /98 (BP Location: Left arm, Patient Position: Sitting)   Pulse 86   Temp 97.9 °F (36.6 °C) (Oral)   Resp 18   Ht 157.2 cm (61.89\")   Wt 118 kg (260 lb)   LMP 2024 (Approximate)   SpO2 94%   Breastfeeding No   BMI 47.72 kg/m²     General Appearance:    Alert, cooperative, no distress   Head:    Normocephalic, without obvious abnormality, atraumatic   Eyes:       Throat:   Lips, tongue, gums normal   Neck:   Supple, symmetrical, trachea midline, no JVD   Lungs:     Clear to auscultation bilaterally, respirations unlabored   Chest Wall:    No tenderness or deformity    Heart:    Regular rate and " rhythm, S1 and S2 normal, no murmur,no  Rub or gallop   Abdomen:     Soft, nontender, bowel sounds active, no masses, no organomegaly    Extremities:   Extremities normal, atraumatic, no cyanosis or edema   Pulses:      Skin:   Skin is warm and dry,  no rashes or palpable lesions   Neurologic:   no focal deficits noted      Lab Results (last 72 hours)       Procedure Component Value Units Date/Time    Comprehensive Metabolic Panel [[id number]]  (Abnormal) Collected: 06/06/24 0620    Specimen: Blood Updated: 06/06/24 0647     Glucose 148 mg/dL      BUN 7 mg/dL      Creatinine 0.71 mg/dL      Sodium 139 mmol/L      Potassium 4.1 mmol/L      Chloride 111 mmol/L      CO2 18.9 mmol/L      Calcium 8.9 mg/dL      Total Protein 6.1 g/dL      Albumin 3.4 g/dL      ALT (SGPT) 77 U/L      AST (SGOT) 31 U/L      Alkaline Phosphatase 115 U/L      Total Bilirubin 0.3 mg/dL      Globulin 2.7 gm/dL      A/G Ratio 1.3 g/dL      BUN/Creatinine Ratio 9.9     Anion Gap 9.1 mmol/L      eGFR 109.7 mL/min/1.73     Narrative:      GFR Normal >60  Chronic Kidney Disease <60  Kidney Failure <15      CBC & Differential [[id number]]  (Abnormal) Collected: 06/06/24 0620    Specimen: Blood Updated: 06/06/24 0628    Narrative:      The following orders were created for panel order CBC & Differential.  Procedure                               Abnormality         Status                     ---------                               -----------         ------                     CBC Auto Differential[[id number]]        Abnormal            Final result                 Please view results for these tests on the individual orders.    CBC Auto Differential [[id number]]  (Abnormal) Collected: 06/06/24 0620    Specimen: Blood Updated: 06/06/24 0628     WBC 8.53 10*3/mm3      RBC 3.88 10*6/mm3      Hemoglobin 12.7 g/dL      Hematocrit 38.4 %      MCV 99.0 fL      MCH 32.7 pg      MCHC 33.1 g/dL      RDW 11.9 %      RDW-SD 42.7 fl      MPV 9.7 fL      Platelets 253  10*3/mm3      Neutrophil % 93.5 %      Lymphocyte % 4.7 %      Monocyte % 1.2 %      Eosinophil % 0.1 %      Basophil % 0.0 %      Immature Grans % 0.5 %      Neutrophils, Absolute 7.98 10*3/mm3      Lymphocytes, Absolute 0.40 10*3/mm3      Monocytes, Absolute 0.10 10*3/mm3      Eosinophils, Absolute 0.01 10*3/mm3      Basophils, Absolute 0.00 10*3/mm3      Immature Grans, Absolute 0.04 10*3/mm3      nRBC 0.0 /100 WBC     Blood Culture - Blood, Arm, Right [[id number]]  (Normal) Collected: 06/04/24 2237    Specimen: Blood from Arm, Right Updated: 06/05/24 2246     Blood Culture No growth at 24 hours    Blood Culture - Blood, Arm, Left [[id number]]  (Normal) Collected: 06/04/24 2237    Specimen: Blood from Arm, Left Updated: 06/05/24 2246     Blood Culture No growth at 24 hours    Respiratory Panel PCR w/COVID-19(SARS-CoV-2) ADALBERTO/BERNADETTE/KAYE/PAD/COR/DAVEY In-House, NP Swab in UTM/VTM, 2 HR TAT - Swab, Nasopharynx [[id number]]  (Normal) Collected: 06/05/24 1758    Specimen: Swab from Nasopharynx Updated: 06/05/24 2048     ADENOVIRUS, PCR Not Detected     Coronavirus 229E Not Detected     Coronavirus HKU1 Not Detected     Coronavirus NL63 Not Detected     Coronavirus OC43 Not Detected     COVID19 Not Detected     Human Metapneumovirus Not Detected     Human Rhinovirus/Enterovirus Not Detected     Influenza A PCR Not Detected     Influenza B PCR Not Detected     Parainfluenza Virus 1 Not Detected     Parainfluenza Virus 2 Not Detected     Parainfluenza Virus 3 Not Detected     Parainfluenza Virus 4 Not Detected     RSV, PCR Not Detected     Bordetella pertussis pcr Not Detected     Bordetella parapertussis PCR Not Detected     Chlamydophila pneumoniae PCR Not Detected     Mycoplasma pneumo by PCR Not Detected    Narrative:      In the setting of a positive respiratory panel with a viral infection PLUS a negative procalcitonin without other underlying concern for bacterial infection, consider observing off antibiotics or  discontinuation of antibiotics and continue supportive care. If the respiratory panel is positive for atypical bacterial infection (Bordetella pertussis, Chlamydophila pneumoniae, or Mycoplasma pneumoniae), consider antibiotic de-escalation to target atypical bacterial infection.    STAT Lactic Acid, Reflex [[id number]]  (Normal) Collected: 06/05/24 1517    Specimen: Blood Updated: 06/05/24 1631     Lactate 1.1 mmol/L     STAT Lactic Acid, Reflex [[id number]]  (Abnormal) Collected: 06/05/24 0930    Specimen: Blood Updated: 06/05/24 1014     Lactate 2.2 mmol/L     STAT Lactic Acid, Reflex [[id number]]  (Abnormal) Collected: 06/05/24 0651    Specimen: Blood Updated: 06/05/24 0739     Lactate 2.2 mmol/L     Basic Metabolic Panel [[id number]]  (Abnormal) Collected: 06/05/24 0651    Specimen: Blood Updated: 06/05/24 0738     Glucose 131 mg/dL      BUN 8 mg/dL      Creatinine 0.77 mg/dL      Sodium 139 mmol/L      Potassium 4.1 mmol/L      Chloride 107 mmol/L      CO2 20.8 mmol/L      Calcium 8.3 mg/dL      BUN/Creatinine Ratio 10.4     Anion Gap 11.2 mmol/L      eGFR 99.5 mL/min/1.73     Narrative:      GFR Normal >60  Chronic Kidney Disease <60  Kidney Failure <15      CBC (No Diff) [[id number]]  (Abnormal) Collected: 06/05/24 0651    Specimen: Blood Updated: 06/05/24 0733     WBC 11.71 10*3/mm3      RBC 3.88 10*6/mm3      Hemoglobin 12.7 g/dL      Hematocrit 38.5 %      MCV 99.2 fL      MCH 32.7 pg      MCHC 33.0 g/dL      RDW 11.9 %      RDW-SD 43.4 fl      MPV 9.5 fL      Platelets 283 10*3/mm3     MRSA Screen, PCR (Inpatient) - Swab, Nares [[id number]]  (Normal) Collected: 06/05/24 0310    Specimen: Swab from Nares Updated: 06/05/24 0730     MRSA PCR No MRSA Detected    Narrative:      The negative predictive value of this diagnostic test is high and should only be used to consider de-escalating anti-MRSA therapy. A positive result may indicate colonization with MRSA and must be correlated clinically.    STAT Lactic Acid,  "Reflex [[id number]]  (Abnormal) Collected: 06/05/24 0202    Specimen: Blood Updated: 06/05/24 0242     Lactate 2.2 mmol/L     Procalcitonin [[id number]]  (Normal) Collected: 06/04/24 2235    Specimen: Blood Updated: 06/05/24 0139     Procalcitonin 0.21 ng/mL     Narrative:      As a Marker for Sepsis (Non-Neonates):    1. <0.5 ng/mL represents a low risk of severe sepsis and/or septic shock.  2. >2 ng/mL represents a high risk of severe sepsis and/or septic shock.    As a Marker for Lower Respiratory Tract Infections that require antibiotic therapy:    PCT on Admission    Antibiotic Therapy       6-12 Hrs later    >0.5                Strongly Recommended  >0.25 - <0.5        Recommended   0.1 - 0.25          Discouraged              Remeasure/reassess PCT  <0.1                Strongly Discouraged     Remeasure/reassess PCT    As 28 day mortality risk marker: \"Change in Procalcitonin Result\" (>80% or <=80%) if Day 0 (or Day 1) and Day 4 values are available. Refer to http://www.[employer]-pct-calculator.com    Change in PCT <=80%  A decrease of PCT levels below or equal to 80% defines a positive change in PCT test result representing a higher risk for 28-day all-cause mortality of patients diagnosed with severe sepsis for septic shock.    Change in PCT >80%  A decrease of PCT levels of more than 80% defines a negative change in PCT result representing a lower risk for 28-day all-cause mortality of patients diagnosed with severe sepsis or septic shock.       Urinalysis, Microscopic Only - Urine, Clean Catch [[id number]]  (Abnormal) Collected: 06/05/24 0008    Specimen: Urine, Clean Catch Updated: 06/05/24 0109     RBC, UA 3-5 /HPF      WBC, UA 0-2 /HPF      Bacteria, UA None Seen /HPF      Squamous Epithelial Cells, UA 3-6 /HPF      Yeast, UA Small/1+ Budding Yeast /HPF      Hyaline Casts, UA None Seen /LPF      Waxy Casts 0-2 /LPF      Methodology Automated Microscopy    Urinalysis With Microscopic If Indicated (No Culture) " - Urine, Clean Catch [[id number]]  (Abnormal) Collected: 06/05/24 0008    Specimen: Urine, Clean Catch Updated: 06/05/24 0045     Color, UA Yellow     Appearance, UA Clear     pH, UA 5.5     Specific Gravity, UA >1.030     Glucose, UA Negative     Ketones, UA Negative     Bilirubin, UA Negative     Blood, UA Large (3+)     Protein, UA Trace     Leuk Esterase, UA Trace     Nitrite, UA Negative     Urobilinogen, UA 1.0 E.U./dL    COVID-19, FLU A/B, RSV PCR 1 HR TAT - Swab, Nasopharynx [[id number]]  (Normal) Collected: 06/04/24 2328    Specimen: Swab from Nasopharynx Updated: 06/05/24 0029     COVID19 Not Detected     Influenza A PCR Not Detected     Influenza B PCR Not Detected     RSV, PCR Not Detected    Narrative:      Fact sheet for providers: https://www.fda.gov/media/151720/download    Fact sheet for patients: https://www.fda.gov/media/537880/download    Test performed by PCR.    Lactic Acid, Plasma [[id number]]  (Abnormal) Collected: 06/04/24 2235    Specimen: Blood Updated: 06/04/24 2319     Lactate 3.7 mmol/L     Comprehensive Metabolic Panel [[id number]]  (Abnormal) Collected: 06/04/24 2235    Specimen: Blood Updated: 06/04/24 2316     Glucose 107 mg/dL      BUN 8 mg/dL      Creatinine 0.71 mg/dL      Sodium 141 mmol/L      Potassium 3.9 mmol/L      Comment: Specimen hemolyzed.  Result may be falsely elevated.        Chloride 107 mmol/L      CO2 20.4 mmol/L      Calcium 9.2 mg/dL      Total Protein 7.0 g/dL      Albumin 4.1 g/dL      ALT (SGPT) 29 U/L      Comment: Specimen hemolyzed.  Result may  be falsely elevated.        AST (SGOT) 24 U/L      Comment: Specimen hemolyzed.  Result may be falsely elevated.        Alkaline Phosphatase 102 U/L      Total Bilirubin <0.2 mg/dL      Globulin 2.9 gm/dL      A/G Ratio 1.4 g/dL      BUN/Creatinine Ratio 11.3     Anion Gap 13.6 mmol/L      eGFR 109.7 mL/min/1.73     Narrative:      GFR Normal >60  Chronic Kidney Disease <60  Kidney Failure <15      Magnesium  [[id number]]  (Normal) Collected: 06/04/24 2235    Specimen: Blood Updated: 06/04/24 2316     Magnesium 1.9 mg/dL     Lipase [[id number]]  (Normal) Collected: 06/04/24 2235    Specimen: Blood Updated: 06/04/24 2316     Lipase 41 U/L     Ethanol [[id number]] Collected: 06/04/24 2235    Specimen: Blood Updated: 06/04/24 2316     Ethanol <10 mg/dL      Ethanol % <0.010 %     CBC & Differential [[id number]]  (Abnormal) Collected: 06/04/24 2235    Specimen: Blood Updated: 06/04/24 2301    Narrative:      The following orders were created for panel order CBC & Differential.  Procedure                               Abnormality         Status                     ---------                               -----------         ------                     CBC Auto Differential[[id number]]        Abnormal            Final result                 Please view results for these tests on the individual orders.    CBC Auto Differential [[id number]]  (Abnormal) Collected: 06/04/24 2235    Specimen: Blood Updated: 06/04/24 2301     WBC 5.80 10*3/mm3      RBC 3.23 10*6/mm3      Hemoglobin 10.6 g/dL      Hematocrit 31.6 %      MCV 97.8 fL      MCH 32.8 pg      MCHC 33.5 g/dL      RDW 11.6 %      RDW-SD 41.7 fl      MPV 9.4 fL      Platelets 243 10*3/mm3      Neutrophil % 94.2 %      Lymphocyte % 4.3 %      Monocyte % 1.2 %      Eosinophil % 0.0 %      Basophil % 0.0 %      Immature Grans % 0.3 %      Neutrophils, Absolute 5.46 10*3/mm3      Lymphocytes, Absolute 0.25 10*3/mm3      Monocytes, Absolute 0.07 10*3/mm3      Eosinophils, Absolute 0.00 10*3/mm3      Basophils, Absolute 0.00 10*3/mm3      Immature Grans, Absolute 0.02 10*3/mm3      nRBC 0.0 /100 WBC     hCG, Serum, Qualitative [[id number]]  (Normal) Collected: 06/04/24 2235    Specimen: Blood Updated: 06/04/24 2259     HCG Qualitative Negative    [place] Draw [[id number]] Collected: 06/04/24 2235    Specimen: Blood Updated: 06/04/24 2246    Narrative:      The following orders were created  "for panel order [place] Draw.  Procedure                               Abnormality         Status                     ---------                               -----------         ------                     Green Top (Gel)[[id number]]                                  Final result               Lavender Top[[id number]]                                     Final result               Gold Top - SST[[id number]]                                                              Light Blue Top[[id number]]                                   Final result                 Please view results for these tests on the individual orders.    Green Top (Gel) [[id number]] Collected: 06/04/24 2235    Specimen: Blood Updated: 06/04/24 2246     Extra Tube Hold for add-ons.     Comment: Auto resulted.       Lavender Top [[id number]] Collected: 06/04/24 2235    Specimen: Blood Updated: 06/04/24 2246     Extra Tube hold for add-on     Comment: Auto resulted       Light Blue Top [[id number]] Collected: 06/04/24 2235    Specimen: Blood Updated: 06/04/24 2246     Extra Tube Hold for add-ons.     Comment: Auto resulted             Data Review:  Results from last 7 days   Lab Units 06/06/24 0620 06/05/24 0651 06/04/24 2235   SODIUM mmol/L 139 139 141   POTASSIUM mmol/L 4.1 4.1 3.9   CHLORIDE mmol/L 111* 107 107   CO2 mmol/L 18.9* 20.8* 20.4*   BUN mg/dL 7 8 8   CREATININE mg/dL 0.71 0.77 0.71   GLUCOSE mg/dL 148* 131* 107*   CALCIUM mg/dL 8.9 8.3* 9.2     Results from last 7 days   Lab Units 06/06/24 0620 06/05/24 0651 06/04/24 2235   WBC 10*3/mm3 8.53 11.71* 5.80   HEMOGLOBIN g/dL 12.7 12.7 10.6*   HEMATOCRIT % 38.4 38.5 31.6*   PLATELETS 10*3/mm3 253 283 243             No results found for: \"TROPONINT\"      Results from last 7 days   Lab Units 06/06/24 0620 06/04/24 2235   ALK PHOS U/L 115 102   BILIRUBIN mg/dL 0.3 <0.2   ALT (SGPT) U/L 77* 29   AST (SGOT) U/L 31 24             No results found for: \"POCGLU\"        Past Medical History:   Diagnosis " Date    Abnormal Pap smear of cervix     Migraine     Seizure disorder        Assessment:  Active Hospital Problems    Diagnosis  POA    **Sepsis [A41.9]  Yes    Acute lactic acidosis [E87.21]  Unknown    Cellulitis [L03.90]  Unknown    Migraine [G43.909]  Unknown    Seizure disorder [G40.909]  Unknown    Allergy to sulfa drugs [Z88.2]  Not Applicable    Drug induced fever [R50.2]  Unknown    Dermatitis, unspecified, on abdominal wall [L30.9]  Unknown      Resolved Hospital Problems   No resolved problems to display.       Plan:  Looks like probably had allergic reaction to sulfa and definitely is better with a big dose of Solu-Medrol and infectious disease consult noted.  Will try some Mycolog cream twice daily to the rash on the abdomen and ask for neurology consult.  Probably can go home tomorrow on some oral Keflex and try some topical Mycolog cream for her rash and see a dermatologist as outpatient.  Advised to avoid sulfa drugs in the future.    Nik Muhammad MD  6/6/2024  11:58 EDT

## 2024-06-06 NOTE — CONSULTS
CONSULT NOTE    Infectious Diseases - Delmis Urban MD  [facility]       Patient Identification:  Name: Magdalena Carreon  Age: 39 y.o.  Sex: female  :  1985  MRN: [id number]             Date of Consultation: 2024      Primary Care Physician: Sonja Broussard MD                               Requesting Physician: Dr. Muhammad  Reason for Consultation: Fever    History of presenting illness: Patient is a 39-year-old female with past medical history remarkable for morbid obesity, history of migraine and seizure disorder and history of PUPPP eruption of pregnancy was in her usual state of her health until about a month ago when she developed itchy rash under her abdominal fold in the suprapubic area in the groin area.  Patient was trying various over-the-counter measures such as topical steroids and antifungal treatment to those areas without any significant improvement and continued to have itching in that area.  Lately this area has spread involving the upper part of her abdomen and going into her flank area.  Patient did not have any fever chills or systemic signs of ill health.  Patient had recent video visit with her primary care provider to get her FMLA papers signed due to her migraine and seizures and while she was interacting with her provider she mention this rash to her.  Patient was started on Bactrim which she took 1 dose and subsequently went out to eat and afterwards developed fever nausea and vomiting and worsening of rash.  This resulted in her coming to the emergency room in the evening of 2024.  Her Bactrim was discontinued blood cultures were drawn and patient was started on Zosyn and vancomycin.  She was noted to be febrile with normal white blood cell count.  Patient is currently feeling better and temperature pattern is better but her rash has gotten worse.  Infectious disease service is consulted.  Patient denies any pain and discomfort at these rashes that have  scattered and spread in her abdomen and surrounding area.  Evaluation revealed erythematous rash involving the torso and upper shoulder which patient was unaware of.  Impression:  This presentation and the above context is consistent with:  1-febrile illness due to Bactrim with worsening rash versus systemic illness due to evolving bacteremia from skin sources  2-progressive itchy rash with pustular areas involving the abdominal wall initially starting in the skin fold and progressing without systemic signs and symptoms of inflammation and infection until after receiving Bactrim is concerning for localized intertriginous rash with secondary infection due to scratching and possible evolving cellulitis  3-morbid obesity  4-history of migraine and seizure disorder  5-history of prednisone allergy      Recommendations/Discussions:  At this juncture I agree with the care plan consisting of discontinuing Bactrim, general workup for febrile illness such as respiratory viral panel blood cultures and MRSA screen.  Since her MRSA screen is negative and she is feeling better even the rash is still present would recommend de-escalating antibiotic therapy and discontinue vancomycin.  Would recommend simplify Zosyn to cephalosporin class such as ceftriaxone or cefazolin while providing symptomatic management of her rash and its progression of Bactrim.  Monitor fever pattern and follow-up on blood culture results.  If she remains hemodynamically stable and fever free then it would not be unreasonable to discharge her on short course of oral Keflex to address secondary cellulitis of the abdominal wall with plans for out of hospital dermatology evaluation.  Short course of fluconazole is not unreasonable in this situation.  Thank you Dr. Muhammad for letting me be the part of your patient care please see above impression and recommendation            Past Medical History:  Past Medical History:   Diagnosis Date    Abnormal Pap smear of  cervix     Migraine     Seizure disorder      Past Surgical History:  Past Surgical History:   Procedure Laterality Date    CEREBRAL ANEURYSM REPAIR        Home Meds:  Medications Prior to Admission   Medication Sig Dispense Refill Last Dose    ibuprofen (ADVIL,MOTRIN) 200 MG tablet Take 2 tablets by mouth Every 6 (Six) Hours As Needed for Mild Pain.       levETIRAcetam (KEPPRA) 1000 MG tablet Take 1 tablet by mouth 2 (Two) Times a Day.  0 6/4/2024 at 1000    ondansetron ODT (ZOFRAN-ODT) 4 MG disintegrating tablet Place 2 tablets on the tongue Every 8 (Eight) Hours As Needed for Nausea or Vomiting. 12 tablet 0 Past Week    promethazine (PHENERGAN) 25 MG tablet Take 1 tablet by mouth Every 6 (Six) Hours As Needed for Nausea or Vomiting. May cause drowsiness. 12 tablet 0 Past Week    sulfamethoxazole-trimethoprim (BACTRIM DS,SEPTRA DS) 800-160 MG per tablet Take 1 tablet by mouth 2 (Two) Times a Day.   6/4/2024    baclofen (LIORESAL) 10 MG tablet Has new Rx that she has not started as of June 2024        Current Meds:     Current Facility-Administered Medications:     acetaminophen (TYLENOL) tablet 650 mg, 650 mg, Oral, Q4H PRN, 650 mg at 06/05/24 1420 **OR** acetaminophen (TYLENOL) 160 MG/5ML oral solution 650 mg, 650 mg, Oral, Q4H PRN **OR** acetaminophen (TYLENOL) suppository 650 mg, 650 mg, Rectal, Q4H PRN, Neena Melton APRN    sennosides-docusate (PERICOLACE) 8.6-50 MG per tablet 2 tablet, 2 tablet, Oral, BID PRN **AND** polyethylene glycol (MIRALAX) packet 17 g, 17 g, Oral, Daily PRN **AND** bisacodyl (DULCOLAX) EC tablet 5 mg, 5 mg, Oral, Daily PRN **AND** bisacodyl (DULCOLAX) suppository 10 mg, 10 mg, Rectal, Daily PRN, Neena Melton APRN    butalbital-acetaminophen-caffeine (FIORICET, ESGIC) -40 MG per tablet 1 tablet, 1 tablet, Oral, Q4H PRN, Nik Muhammad MD, 1 tablet at 06/06/24 0210    calcium carbonate (TUMS) chewable tablet 500 mg (200 mg elemental), 2 tablet, Oral, BID PRN,  Neena Melton APRN    folic acid (FOLVITE) tablet 1 mg, 1 mg, Oral, Daily, Nik Muhammad MD, 1 mg at 06/05/24 1249    HYDROcodone-acetaminophen (NORCO) 5-325 MG per tablet 1 tablet, 1 tablet, Oral, Q6H PRN, Nik Muhammad MD    HYDROmorphone (DILAUDID) injection 0.5 mg, 0.5 mg, Intravenous, Q2H PRN, Nik Muhammad MD    lactated ringers bolus 1,000 mL, 1,000 mL, Intravenous, Once, Oliva Reynaga PA-C, Stopped at 06/05/24 0735    levETIRAcetam (KEPPRA) tablet 1,000 mg, 1,000 mg, Oral, BID, Nik Muhammad MD, 1,000 mg at 06/05/24 2027    LORazepam (ATIVAN) tablet 1 mg, 1 mg, Oral, Q1H PRN **OR** midazolam (VERSED) injection 2 mg, 2 mg, Intravenous, Q1H PRN **OR** LORazepam (ATIVAN) tablet 2 mg, 2 mg, Oral, Q1H PRN **OR** midazolam (VERSED) injection 4 mg, 4 mg, Intravenous, Q1H PRN **OR** midazolam (VERSED) injection 4 mg, 4 mg, Intravenous, Q15 Min PRN **OR** midazolam (VERSED) injection 4 mg, 4 mg, Intramuscular, Q15 Min PRN, Nik Muhammad MD    Magnesium Standard Dose Replacement - Follow Nurse / BPA Driven Protocol, , Does not apply, PRN, Nik Muhammad MD    nicotine (NICODERM CQ) 21 MG/24HR patch 1 patch, 1 patch, Transdermal, Q24H, Neena Melton APRN, 1 patch at 06/05/24 0853    nitroglycerin (NITROSTAT) SL tablet 0.4 mg, 0.4 mg, Sublingual, Q5 Min PRN, Neena Melton APRN    ondansetron ODT (ZOFRAN-ODT) disintegrating tablet 4 mg, 4 mg, Oral, Q6H PRN **OR** ondansetron (ZOFRAN) injection 4 mg, 4 mg, Intravenous, Q6H PRN, Neena Melton APRN    Pharmacy to dose vancomycin, , Does not apply, Continuous PRN, Neena Melton APRN    piperacillin-tazobactam (ZOSYN) 4.5 g IVPB in 100 mL NS MBP (CD), 4.5 g, Intravenous, Q8H, Neena Melton APRN, 4.5 g at 06/06/24 0009    sodium chloride 0.9 % flush 10 mL, 10 mL, Intravenous, PRN, Emergency, Triage Protocol, MD    sodium chloride 0.9 % flush 10 mL, 10 mL, Intravenous, Q12H, Neena Melton APRN, 10 mL at 06/05/24  "0853    sodium chloride 0.9 % flush 10 mL, 10 mL, Intravenous, PRN, Neena Melton APRN    sodium chloride 0.9 % infusion 40 mL, 40 mL, Intravenous, PRN, Neena Melton APRN    sodium chloride 0.9 % infusion, 100 mL/hr, Intravenous, Continuous, Neena Melton APRN, Last Rate: 100 mL/hr at 06/05/24 1714, 100 mL/hr at 06/05/24 1714    thiamine (B-1) injection 200 mg, 200 mg, Intravenous, Q8H, 200 mg at 06/06/24 0509 **FOLLOWED BY** [START ON 6/10/2024] thiamine (VITAMIN B-1) tablet 100 mg, 100 mg, Oral, Daily, Nik Muhammad MD    Vancomycin HCl 1,250 mg in sodium chloride 0.9 % 250 mL VTB, 1,250 mg, Intravenous, Q12H, Neena Melton APRN, Last Rate: 200 mL/hr at 06/06/24 0037, 1,250 mg at 06/06/24 0037  Allergies:  Allergies   Allergen Reactions    Prednisone Hives     Social History:   Social History     Tobacco Use    Smoking status: Every Day     Current packs/day: 1.00     Average packs/day: 1 pack/day for 25.0 years (25.0 ttl pk-yrs)     Types: Cigarettes     Start date: 6/5/1999    Smokeless tobacco: Not on file   Substance Use Topics    Alcohol use: Not on file     Comment: before pregnancy      Family History:  Family History   Problem Relation Age of Onset    Melanoma Father     Diabetes Father     Pancreatic cancer Paternal Grandmother     Diabetes Paternal Grandmother     Diabetes Maternal Grandmother           Review of Systems  See history of present illness and past medical history  Database reviewed    Vitals:   /77   Pulse 86   Temp 98.1 °F (36.7 °C)   Resp 18   Ht 157.2 cm (61.89\")   Wt 118 kg (260 lb)   LMP 04/29/2024 (Approximate)   SpO2 94%   Breastfeeding No   BMI 47.72 kg/m²   I/O:   Intake/Output Summary (Last 24 hours) at 6/6/2024 0622  Last data filed at 6/5/2024 1817  Gross per 24 hour   Intake 778 ml   Output --   Net 778 ml     Exam:  Patient is examined using the personal protective equipment as per guidelines from infection control for this " particular patient as enacted.  Hand washing was performed before and after patient interaction.  General Appearance:    Alert, cooperative, no distress, appears stated age   Head:    Normocephalic, without obvious abnormality, atraumatic   Eyes:    PERRL, conjunctivae/corneas clear, EOM's intact, both eyes   Ears:    Normal external ear canals, both ears   Nose:   Nares normal, septum midline, mucosa normal, no drainage    or sinus tenderness   Throat:   Lips, tongue, gums normal; oral mucosa pink and moist   Neck:   Supple, symmetrical, trachea midline, no adenopathy;     thyroid:  no enlargement/tenderness/nodules; no carotid    bruit or JVD   Back:     Symmetric, no curvature, ROM normal, no CVA tenderness   Lungs:     Clear to auscultation bilaterally, respirations unlabored   Chest Wall:    No tenderness or deformity    Heart:    Regular rate and rhythm, S1 and S2 normal, no murmur, rub   or gallop   Abdomen:   Obese soft and nontender   Extremities:   Extremities normal, atraumatic, no cyanosis or edema   Pulses:   Pulses palpable in all extremities; symmetric all extremities   Skin: Scattered area of maculopapular rash with excoriation and folliculitis type lesion involving the anterior abdominal wall with extension along the shoulder and upper torso.  No obvious cellulitis noted.   Neurologic:   CNII-XII intact, motor strength grossly intact, sensation grossly intact to light touch, no focal deficits noted       Data Review:    I reviewed the patient's new clinical results.  Results from last 7 days   Lab Units 06/05/24  0651 06/04/24  2235   WBC 10*3/mm3 11.71* 5.80   HEMOGLOBIN g/dL 12.7 10.6*   PLATELETS 10*3/mm3 283 243     Results from last 7 days   Lab Units 06/05/24  0651 06/04/24  2235   SODIUM mmol/L 139 141   POTASSIUM mmol/L 4.1 3.9   CHLORIDE mmol/L 107 107   CO2 mmol/L 20.8* 20.4*   BUN mg/dL 8 8   CREATININE mg/dL 0.77 0.71   CALCIUM mg/dL 8.3* 9.2   GLUCOSE mg/dL 131* 107*     Microbiology  Results (last 10 days)       Procedure Component Value - Date/Time    Respiratory Panel PCR w/COVID-19(SARS-CoV-2) ADALBERTO/BERNADETTE/KAYE/PAD/COR/DAVEY In-House, NP Swab in UTM/VTM, 2 HR TAT - Swab, Nasopharynx [[id number]]  (Normal) Collected: 06/05/24 1758    Lab Status: Final result Specimen: Swab from Nasopharynx Updated: 06/05/24 2048     ADENOVIRUS, PCR Not Detected     Coronavirus 229E Not Detected     Coronavirus HKU1 Not Detected     Coronavirus NL63 Not Detected     Coronavirus OC43 Not Detected     COVID19 Not Detected     Human Metapneumovirus Not Detected     Human Rhinovirus/Enterovirus Not Detected     Influenza A PCR Not Detected     Influenza B PCR Not Detected     Parainfluenza Virus 1 Not Detected     Parainfluenza Virus 2 Not Detected     Parainfluenza Virus 3 Not Detected     Parainfluenza Virus 4 Not Detected     RSV, PCR Not Detected     Bordetella pertussis pcr Not Detected     Bordetella parapertussis PCR Not Detected     Chlamydophila pneumoniae PCR Not Detected     Mycoplasma pneumo by PCR Not Detected    Narrative:      In the setting of a positive respiratory panel with a viral infection PLUS a negative procalcitonin without other underlying concern for bacterial infection, consider observing off antibiotics or discontinuation of antibiotics and continue supportive care. If the respiratory panel is positive for atypical bacterial infection (Bordetella pertussis, Chlamydophila pneumoniae, or Mycoplasma pneumoniae), consider antibiotic de-escalation to target atypical bacterial infection.    MRSA Screen, PCR (Inpatient) - Swab, Nares [[id number]]  (Normal) Collected: 06/05/24 0310    Lab Status: Final result Specimen: Swab from Nares Updated: 06/05/24 0730     MRSA PCR No MRSA Detected    Narrative:      The negative predictive value of this diagnostic test is high and should only be used to consider de-escalating anti-MRSA therapy. A positive result may indicate colonization with MRSA and must be  correlated clinically.    COVID-19, FLU A/B, RSV PCR 1 HR TAT - Swab, Nasopharynx [[id number]]  (Normal) Collected: 06/04/24 2328    Lab Status: Final result Specimen: Swab from Nasopharynx Updated: 06/05/24 0029     COVID19 Not Detected     Influenza A PCR Not Detected     Influenza B PCR Not Detected     RSV, PCR Not Detected    Narrative:      Fact sheet for providers: https://www.fda.gov/media/631445/download    Fact sheet for patients: https://www.fda.gov/media/766728/download    Test performed by PCR.    Blood Culture - Blood, Arm, Right [[id number]]  (Normal) Collected: 06/04/24 2237    Lab Status: Preliminary result Specimen: Blood from Arm, Right Updated: 06/05/24 2246     Blood Culture No growth at 24 hours    Blood Culture - Blood, Arm, Left [[id number]]  (Normal) Collected: 06/04/24 2237    Lab Status: Preliminary result Specimen: Blood from Arm, Left Updated: 06/05/24 2246     Blood Culture No growth at 24 hours              Assessment:  Active Hospital Problems    Diagnosis  POA    **Sepsis [A41.9]  Yes    Acute lactic acidosis [E87.21]  Unknown    Cellulitis [L03.90]  Unknown    Migraine [G43.909]  Unknown    Seizure disorder [G40.909]  Unknown    Allergy to sulfa drugs [Z88.2]  Not Applicable    Drug induced fever [R50.2]  Unknown    Dermatitis, unspecified, on abdominal wall [L30.9]  Unknown      Resolved Hospital Problems   No resolved problems to display.         Plan:  See above  Delmis Wu MD   6/6/2024  06:22 EDT    Parts of this note may be an electronic transcription/translation of spoken language to printed text using the Dragon dictation system.

## 2024-06-06 NOTE — PLAN OF CARE
Goal Outcome Evaluation:           Progress: improving  Outcome Evaluation: Pt afebrile throughout night, vitals WNL. Normal sinus on monitor, sinus tach with activity. C/o headache- medicated per MAR. Blood cultures pending.

## 2024-06-06 NOTE — PAYOR COMM NOTE
"Magdalena Huizar (39 y.o. Female)        PLEASE SEE ATTACHED FOR INPT AUTH.     REF#[id number]    PLEASE CALL   OR  [phone] WITH INPT AUTH AND DAYS APPROVED.     THANK YOU    NIA MONSON LPN CCP   Date of Birth   1985    Social Security Number       Address   [address] [place] [address]    Home Phone   [phone]    MRN   [id number]       [place]   None    Marital Status   Single                            Admission Date   24- INPT ORDERS Admission Type   Emergency    Admitting Provider   Nik Muhammad MD    Attending Provider   Nik Muhammad MD    Department, Room/Bed   [address], [address]/       Discharge Date       Discharge Disposition       Discharge Destination                                 Attending Provider: Nik Muhammad MD    Allergies: Prednisone    Isolation: None   Infection: None   Code Status: CPR    Ht: 157.2 cm (61.89\")   Wt: 118 kg (260 lb)    Admission Cmt: None   Principal Problem: Sepsis [A41.9]                   Active Insurance as of 2024       Primary Coverage       Payor Plan Insurance Group Employer/Plan Group    [employer] BLUE [employer]OSS [employer] BLUE [employer]OSS BLUE [employer] PPO [id number]       Payor Plan Address Payor Plan Phone Number Payor Plan Fax Number Effective Dates    [address] [phone]  2024 - None Entered    [facility] [address]         Subscriber Name Subscriber Birth Date Member ID       MAGDALENA HUIZAR 1985 [id number]                     Emergency Contacts        (Rel.) Home Phone Work Phone Mobile Phone    Kevan Sánchez (Significant Other) [phone] -- --              [place]: [facility] [id number]  Tax ID [id number]     History & Physical        Nik Muhammad MD at 24 1708          HISTORY AND PHYSICAL   [facility]        Patient Identification:  Name: Magdalena Huizar  Age: 39 y.o.  Sex: female  :  1985  MRN: [id number]                   "   Primary Care Physician: Sonja Broussard MD    Chief Complaint: Nausea and vomiting with fever    History of Present Illness:       The patient  is a 39 y.o. female with history of alcohol abuse who presents for nausea and vomiting with fever, chills, generalized bodyaches, headache, and abdominal cramping that began this evening around 6:30-7:00 PM.  Patient states she has also had a slight cough.  States she felt okay this morning.  Denies known sick contacts.  States she was just prescribed Bactrim for abdominal wall cellulitis and she took a dose around 6 PM, had not eaten much prior to taking it, and then went and ate at [employer] and had 1 piece of quesadilla and then started to feel ill.  Denies chest pain, shortness of breath, diarrhea, urinary symptoms, sore throat.  Admits to drinking 3-4 shots of liquor today.  She says she thinks she may have had an allergic reaction to Bactrim in the past.  After she took 1 dose she started feeling worse and had fevers.  She has had headaches as well and said redness all over her body now.  The patient was evaluated in the ER and cultures were done she was started on antibiotics and admitted for further evaluation treatment.  She denies having significant outdoor exposure or any tick bites that she is aware of.  She works in a warehouse and drives a forklift.    Past Medical History:  Past Medical History:   Diagnosis Date    Abnormal Pap smear of cervix     Migraine     Seizure disorder      Past Surgical History:  Past Surgical History:   Procedure Laterality Date    CEREBRAL ANEURYSM REPAIR        Home Meds:  Medications Prior to Admission   Medication Sig Dispense Refill Last Dose    ibuprofen (ADVIL,MOTRIN) 200 MG tablet Take 2 tablets by mouth Every 6 (Six) Hours As Needed for Mild Pain.       levETIRAcetam (KEPPRA) 1000 MG tablet Take 1 tablet by mouth 2 (Two) Times a Day.  0 6/4/2024 at 1000    ondansetron ODT (ZOFRAN-ODT) 4 MG disintegrating tablet Place 2  tablets on the tongue Every 8 (Eight) Hours As Needed for Nausea or Vomiting. 12 tablet 0 Past Week    promethazine (PHENERGAN) 25 MG tablet Take 1 tablet by mouth Every 6 (Six) Hours As Needed for Nausea or Vomiting. May cause drowsiness. 12 tablet 0 Past Week    sulfamethoxazole-trimethoprim (BACTRIM DS,SEPTRA DS) 800-160 MG per tablet Take 1 tablet by mouth 2 (Two) Times a Day.   6/4/2024    baclofen (LIORESAL) 10 MG tablet Has new Rx that she has not started as of June 2024        Current meds    Current Facility-Administered Medications:     acetaminophen (TYLENOL) tablet 650 mg, 650 mg, Oral, Q4H PRN, 650 mg at 06/05/24 1420 **OR** acetaminophen (TYLENOL) 160 MG/5ML oral solution 650 mg, 650 mg, Oral, Q4H PRN **OR** acetaminophen (TYLENOL) suppository 650 mg, 650 mg, Rectal, Q4H PRN, Neena Melton APRN    sennosides-docusate (PERICOLACE) 8.6-50 MG per tablet 2 tablet, 2 tablet, Oral, BID PRN **AND** polyethylene glycol (MIRALAX) packet 17 g, 17 g, Oral, Daily PRN **AND** bisacodyl (DULCOLAX) EC tablet 5 mg, 5 mg, Oral, Daily PRN **AND** bisacodyl (DULCOLAX) suppository 10 mg, 10 mg, Rectal, Daily PRN, Neena Melton APRN    butalbital-acetaminophen-caffeine (FIORICET, ESGIC) -40 MG per tablet 1 tablet, 1 tablet, Oral, Q4H PRN, Nik Muhammad MD    calcium carbonate (TUMS) chewable tablet 500 mg (200 mg elemental), 2 tablet, Oral, BID PRN, Neena Melton APRN    folic acid (FOLVITE) tablet 1 mg, 1 mg, Oral, Daily, Nik Muhammad MD, 1 mg at 06/05/24 1249    HYDROcodone-acetaminophen (NORCO) 5-325 MG per tablet 1 tablet, 1 tablet, Oral, Q6H PRN, Nik Muhammad MD    HYDROmorphone (DILAUDID) injection 0.5 mg, 0.5 mg, Intravenous, Q2H PRN, Nik Muhammad MD    lactated ringers bolus 1,000 mL, 1,000 mL, Intravenous, Once, Oliva Reynaga PA-C, Stopped at 06/05/24 0777    levETIRAcetam (KEPPRA) tablet 1,000 mg, 1,000 mg, Oral, BID, Nik Muhammad MD, 1,000 mg at 06/05/24 1246     LORazepam (ATIVAN) tablet 1 mg, 1 mg, Oral, Q1H PRN **OR** midazolam (VERSED) injection 2 mg, 2 mg, Intravenous, Q1H PRN **OR** LORazepam (ATIVAN) tablet 2 mg, 2 mg, Oral, Q1H PRN **OR** midazolam (VERSED) injection 4 mg, 4 mg, Intravenous, Q1H PRN **OR** midazolam (VERSED) injection 4 mg, 4 mg, Intravenous, Q15 Min PRN **OR** midazolam (VERSED) injection 4 mg, 4 mg, Intramuscular, Q15 Min PRN, Nik Muhammad MD    Magnesium Standard Dose Replacement - Follow Nurse / BPA Driven Protocol, , Does not apply, PRN, Nik Muhammad MD    methylPREDNISolone sodium succinate (SOLU-Medrol) injection 125 mg, 125 mg, Intravenous, Once, Nik Muhammad MD    nicotine (NICODERM CQ) 21 MG/24HR patch 1 patch, 1 patch, Transdermal, Q24H, Neena Melton APRN, 1 patch at 06/05/24 0853    nitroglycerin (NITROSTAT) SL tablet 0.4 mg, 0.4 mg, Sublingual, Q5 Min PRN, Neena Melton APRN    ondansetron ODT (ZOFRAN-ODT) disintegrating tablet 4 mg, 4 mg, Oral, Q6H PRN **OR** ondansetron (ZOFRAN) injection 4 mg, 4 mg, Intravenous, Q6H PRN, Neena Melton APRN    Pharmacy to dose vancomycin, , Does not apply, Continuous PRN, Neena Melton APRN    piperacillin-tazobactam (ZOSYN) 4.5 g IVPB in 100 mL NS MBP (CD), 4.5 g, Intravenous, Q8H, Neena Melton APRN, 4.5 g at 06/05/24 0853    sodium chloride 0.9 % flush 10 mL, 10 mL, Intravenous, PRN, Emergency, Triage Protocol, MD    sodium chloride 0.9 % flush 10 mL, 10 mL, Intravenous, Q12H, Neena Melton APRN, 10 mL at 06/05/24 0853    sodium chloride 0.9 % flush 10 mL, 10 mL, Intravenous, PRN, Neena Melton APRN    sodium chloride 0.9 % infusion 40 mL, 40 mL, Intravenous, PRN, Neena Melton APRN    sodium chloride 0.9 % infusion, 100 mL/hr, Intravenous, Continuous, Neena Melton APRN, Last Rate: 100 mL/hr at 06/05/24 0321, 100 mL/hr at 06/05/24 0321    thiamine (B-1) injection 200 mg, 200 mg, Intravenous, Q8H, 200 mg at 06/05/24 1420  **FOLLOWED BY** [START ON 6/10/2024] thiamine (VITAMIN B-1) tablet 100 mg, 100 mg, Oral, Daily, Nik Muhammad MD    Vancomycin HCl 1,250 mg in sodium chloride 0.9 % 250 mL VTB, 1,250 mg, Intravenous, Q12H, Neena Melton, APRN, Last Rate: 200 mL/hr at 06/05/24 1419, 1,250 mg at 06/05/24 1419  Allergies:  Allergies   Allergen Reactions    Prednisone Hives     Immunizations:  Immunization History   Administered Date(s) Administered    COVID-19 (MODERNA) 1st,2nd,3rd Dose Monovalent 04/08/2021, 05/06/2021     Social History:   Social History     Social History Narrative    Not on file     Social History     Socioeconomic History    Marital status: Single   Tobacco Use    Smoking status: Every Day     Current packs/day: 1.00     Average packs/day: 1 pack/day for 25.0 years (25.0 ttl pk-yrs)     Types: Cigarettes     Start date: 6/5/1999   Vaping Use    Vaping status: Never Used   Substance and Sexual Activity    Drug use: Yes     Types: Marijuana    Sexual activity: Yes       Family History:  Family History   Problem Relation Age of Onset    Melanoma Father     Diabetes Father     Pancreatic cancer Paternal Grandmother     Diabetes Paternal Grandmother     Diabetes Maternal Grandmother         Review of Systems  See history of present illness and past medical history.  Patient denies headache, dizziness, syncope, falls, trauma, change in vision, change in hearing, change in taste, changes in weight, changes in appetite, focal weakness, numbness, or paresthesia.  Patient denies chest pain, palpitations, dyspnea, orthopnea, PND, cough, sinus pressure, rhinorrhea, epistaxis, hemoptysis, nausea, vomiting, hematemesis, diarrhea, constipation or hematochezia. Denies cold or heat intolerance, polydipsia, polyuria, polyphagia. Denies hematuria, pyuria, dysuria, hesitancy, frequency or urgency. Denies consumption of raw and under cooked meats foods or change in water source.  Denies fever, chills, sweats, night sweats.   "Denies missing any routine medications. Remainder of ROS is negative.    Objective:  tMax 24 hrs: Temp (24hrs), Av.2 °F (37.9 °C), Min:98.1 °F (36.7 °C), Max:102.2 °F (39 °C)    Vitals Ranges:   Temp:  [98.1 °F (36.7 °C)-102.2 °F (39 °C)] 101.3 °F (38.5 °C)  Heart Rate:  [103-136] 103  Resp:  [18-20] 18  BP: (109-195)/() 109/50      Exam:  /50 (BP Location: Right arm, Patient Position: Lying)   Pulse 103   Temp (!) 101.3 °F (38.5 °C) (Oral) Comment: rn notified  Resp 18   Ht 157.2 cm (61.89\")   Wt 118 kg (260 lb)   LMP 2024 (Approximate)   SpO2 97%   Breastfeeding No   BMI 47.72 kg/m²     General Appearance:    Alert, cooperative, no distress, appears stated age   Head:    Normocephalic, without obvious abnormality, atraumatic   Eyes:    PERRL, conjunctivae/corneas clear, EOM's intact, both eyes   Ears:    Normal external ear canals, both ears   Nose:   Nares normal, septum midline, mucosa normal, no drainage    or sinus tenderness   Throat:   Lips, mucosa, and tongue normal   Neck:   Supple, symmetrical, trachea midline, no adenopathy;     thyroid:  no enlargement/tenderness/nodules; no carotid    bruit or JVD   Back:     Symmetric, no curvature, ROM normal, no CVA tenderness   Lungs:     Clear to auscultation bilaterally, respirations unlabored   Chest Wall:    No tenderness or deformity    Heart:    Regular rate and rhythm, S1 and S2 normal, no murmur, rub   or gallop   Abdomen:     Soft, nontender, bowel sounds active all four quadrants,     no masses, no hepatomegaly, no splenomegaly   Extremities:   Extremities normal, atraumatic, no cyanosis or edema   Pulses:   2+ and symmetric all extremities   Skin:   Skin color, texture, turgor normal, dermatitis rash over anterior abdominal wall and she is red all over which may be due to drug reaction   Lymph nodes:   Cervical, supraclavicular, and axillary nodes normal   Neurologic:   CNII-XII intact, normal strength, sensation intact " throughout      .    Data Review:  Lab Results (last 72 hours)       Procedure Component Value Units Date/Time    STAT Lactic Acid, Reflex [[id number]]  (Normal) Collected: 06/05/24 1517    Specimen: Blood Updated: 06/05/24 1631     Lactate 1.1 mmol/L     STAT Lactic Acid, Reflex [[id number]]  (Abnormal) Collected: 06/05/24 0930    Specimen: Blood Updated: 06/05/24 1014     Lactate 2.2 mmol/L     STAT Lactic Acid, Reflex [[id number]]  (Abnormal) Collected: 06/05/24 0651    Specimen: Blood Updated: 06/05/24 0739     Lactate 2.2 mmol/L     Basic Metabolic Panel [[id number]]  (Abnormal) Collected: 06/05/24 0651    Specimen: Blood Updated: 06/05/24 0738     Glucose 131 mg/dL      BUN 8 mg/dL      Creatinine 0.77 mg/dL      Sodium 139 mmol/L      Potassium 4.1 mmol/L      Chloride 107 mmol/L      CO2 20.8 mmol/L      Calcium 8.3 mg/dL      BUN/Creatinine Ratio 10.4     Anion Gap 11.2 mmol/L      eGFR 99.5 mL/min/1.73     Narrative:      GFR Normal >60  Chronic Kidney Disease <60  Kidney Failure <15      CBC (No Diff) [[id number]]  (Abnormal) Collected: 06/05/24 0651    Specimen: Blood Updated: 06/05/24 0733     WBC 11.71 10*3/mm3      RBC 3.88 10*6/mm3      Hemoglobin 12.7 g/dL      Hematocrit 38.5 %      MCV 99.2 fL      MCH 32.7 pg      MCHC 33.0 g/dL      RDW 11.9 %      RDW-SD 43.4 fl      MPV 9.5 fL      Platelets 283 10*3/mm3     MRSA Screen, PCR (Inpatient) - Swab, Nares [[id number]]  (Normal) Collected: 06/05/24 0310    Specimen: Swab from Nares Updated: 06/05/24 0730     MRSA PCR No MRSA Detected    Narrative:      The negative predictive value of this diagnostic test is high and should only be used to consider de-escalating anti-MRSA therapy. A positive result may indicate colonization with MRSA and must be correlated clinically.    STAT Lactic Acid, Reflex [[id number]]  (Abnormal) Collected: 06/05/24 0202    Specimen: Blood Updated: 06/05/24 0242     Lactate 2.2 mmol/L     Procalcitonin [[id number]]  (Normal)  "Collected: 06/04/24 2235    Specimen: Blood Updated: 06/05/24 0139     Procalcitonin 0.21 ng/mL     Narrative:      As a Marker for Sepsis (Non-Neonates):    1. <0.5 ng/mL represents a low risk of severe sepsis and/or septic shock.  2. >2 ng/mL represents a high risk of severe sepsis and/or septic shock.    As a Marker for Lower Respiratory Tract Infections that require antibiotic therapy:    PCT on Admission    Antibiotic Therapy       6-12 Hrs later    >0.5                Strongly Recommended  >0.25 - <0.5        Recommended   0.1 - 0.25          Discouraged              Remeasure/reassess PCT  <0.1                Strongly Discouraged     Remeasure/reassess PCT    As 28 day mortality risk marker: \"Change in Procalcitonin Result\" (>80% or <=80%) if Day 0 (or Day 1) and Day 4 values are available. Refer to http://www.[facility]pct-calculator.com    Change in PCT <=80%  A decrease of PCT levels below or equal to 80% defines a positive change in PCT test result representing a higher risk for 28-day all-cause mortality of patients diagnosed with severe sepsis for septic shock.    Change in PCT >80%  A decrease of PCT levels of more than 80% defines a negative change in PCT result representing a lower risk for 28-day all-cause mortality of patients diagnosed with severe sepsis or septic shock.       Urinalysis, Microscopic Only - Urine, Clean Catch [[id number]]  (Abnormal) Collected: 06/05/24 0008    Specimen: Urine, Clean Catch Updated: 06/05/24 0109     RBC, UA 3-5 /HPF      WBC, UA 0-2 /HPF      Bacteria, UA None Seen /HPF      Squamous Epithelial Cells, UA 3-6 /HPF      Yeast, UA Small/1+ Budding Yeast /HPF      Hyaline Casts, UA None Seen /LPF      Waxy Casts 0-2 /LPF      Methodology Automated Microscopy    Urinalysis With Microscopic If Indicated (No Culture) - Urine, Clean Catch [[id number]]  (Abnormal) Collected: 06/05/24 0008    Specimen: Urine, Clean Catch Updated: 06/05/24 0045     Color, UA Yellow     " Appearance, UA Clear     pH, UA 5.5     Specific Gravity, UA >1.030     Glucose, UA Negative     Ketones, UA Negative     Bilirubin, UA Negative     Blood, UA Large (3+)     Protein, UA Trace     Leuk Esterase, UA Trace     Nitrite, UA Negative     Urobilinogen, UA 1.0 E.U./dL    COVID-19, FLU A/B, RSV PCR 1 HR TAT - Swab, Nasopharynx [[id number]]  (Normal) Collected: 06/04/24 2328    Specimen: Swab from Nasopharynx Updated: 06/05/24 0029     COVID19 Not Detected     Influenza A PCR Not Detected     Influenza B PCR Not Detected     RSV, PCR Not Detected    Narrative:      Fact sheet for providers: https://www.fda.gov/media/768371/download    Fact sheet for patients: https://www.fda.gov/media/928146/download    Test performed by PCR.    Lactic Acid, Plasma [[id number]]  (Abnormal) Collected: 06/04/24 2235    Specimen: Blood Updated: 06/04/24 2319     Lactate 3.7 mmol/L     Comprehensive Metabolic Panel [[id number]]  (Abnormal) Collected: 06/04/24 2235    Specimen: Blood Updated: 06/04/24 2316     Glucose 107 mg/dL      BUN 8 mg/dL      Creatinine 0.71 mg/dL      Sodium 141 mmol/L      Potassium 3.9 mmol/L      Comment: Specimen hemolyzed.  Result may be falsely elevated.        Chloride 107 mmol/L      CO2 20.4 mmol/L      Calcium 9.2 mg/dL      Total Protein 7.0 g/dL      Albumin 4.1 g/dL      ALT (SGPT) 29 U/L      Comment: Specimen hemolyzed.  Result may  be falsely elevated.        AST (SGOT) 24 U/L      Comment: Specimen hemolyzed.  Result may be falsely elevated.        Alkaline Phosphatase 102 U/L      Total Bilirubin <0.2 mg/dL      Globulin 2.9 gm/dL      A/G Ratio 1.4 g/dL      BUN/Creatinine Ratio 11.3     Anion Gap 13.6 mmol/L      eGFR 109.7 mL/min/1.73     Narrative:      GFR Normal >60  Chronic Kidney Disease <60  Kidney Failure <15      Magnesium [[id number]]  (Normal) Collected: 06/04/24 2235    Specimen: Blood Updated: 06/04/24 2316     Magnesium 1.9 mg/dL     Lipase [[id number]]  (Normal) Collected:  06/04/24 2235    Specimen: Blood Updated: 06/04/24 2316     Lipase 41 U/L     Ethanol [[id number]] Collected: 06/04/24 2235    Specimen: Blood Updated: 06/04/24 2316     Ethanol <10 mg/dL      Ethanol % <0.010 %     CBC & Differential [[id number]]  (Abnormal) Collected: 06/04/24 2235    Specimen: Blood Updated: 06/04/24 2301    Narrative:      The following orders were created for panel order CBC & Differential.  Procedure                               Abnormality         Status                     ---------                               -----------         ------                     CBC Auto Differential[[id number]]        Abnormal            Final result                 Please view results for these tests on the individual orders.    CBC Auto Differential [[id number]]  (Abnormal) Collected: 06/04/24 2235    Specimen: Blood Updated: 06/04/24 2301     WBC 5.80 10*3/mm3      RBC 3.23 10*6/mm3      Hemoglobin 10.6 g/dL      Hematocrit 31.6 %      MCV 97.8 fL      MCH 32.8 pg      MCHC 33.5 g/dL      RDW 11.6 %      RDW-SD 41.7 fl      MPV 9.4 fL      Platelets 243 10*3/mm3      Neutrophil % 94.2 %      Lymphocyte % 4.3 %      Monocyte % 1.2 %      Eosinophil % 0.0 %      Basophil % 0.0 %      Immature Grans % 0.3 %      Neutrophils, Absolute 5.46 10*3/mm3      Lymphocytes, Absolute 0.25 10*3/mm3      Monocytes, Absolute 0.07 10*3/mm3      Eosinophils, Absolute 0.00 10*3/mm3      Basophils, Absolute 0.00 10*3/mm3      Immature Grans, Absolute 0.02 10*3/mm3      nRBC 0.0 /100 WBC     hCG, Serum, Qualitative [[id number]]  (Normal) Collected: 06/04/24 2235    Specimen: Blood Updated: 06/04/24 2259     HCG Qualitative Negative    Stuart Draw [[id number]] Collected: 06/04/24 2235    Specimen: Blood Updated: 06/04/24 2246    Narrative:      The following orders were created for panel order Stuart Draw.  Procedure                               Abnormality         Status                     ---------                                -----------         ------                     Green Top (Gel)[[id number]]                                  Final result               Lavender Top[[id number]]                                     Final result               Gold Top - SST[[id number]]                                                              Light Blue Top[[id number]]                                   Final result                 Please view results for these tests on the individual orders.    Green Top (Gel) [[id number]] Collected: 06/04/24 2235    Specimen: Blood Updated: 06/04/24 2246     Extra Tube Hold for add-ons.     Comment: Auto resulted.       Lavender Top [[id number]] Collected: 06/04/24 2235    Specimen: Blood Updated: 06/04/24 2246     Extra Tube hold for add-on     Comment: Auto resulted       Light Blue Top [[id number]] Collected: 06/04/24 2235    Specimen: Blood Updated: 06/04/24 2246     Extra Tube Hold for add-ons.     Comment: Auto resulted       Blood Culture - Blood, Arm, Right [[id number]] Collected: 06/04/24 2237    Specimen: Blood from Arm, Right Updated: 06/04/24 2244    Blood Culture - Blood, Arm, Left [[id number]] Collected: 06/04/24 2237    Specimen: Blood from Arm, Left Updated: 06/04/24 2244                     Imaging Results (All)       Procedure Component Value Units Date/Time    CT Abdomen Pelvis With Contrast [[id number]] Collected: 06/05/24 0001     Updated: 06/05/24 0001    Narrative:        Patient: ASHOK HUIZAR  Time Out: 00:01  Exam(s): CT ABDOMEN + PELVIS With Contrast     EXAM:  CT Abdomen and Pelvis With Intravenous Contrast    CLINICAL HISTORY:  Reason for exam: generalized abdominal pain, nausea, vomiting, fever,   tachycardia.    TECHNIQUE:  Axial computed tomography images of the abdomen and pelvis with   intravenous contrast.  CTDI is 30 mGy and DLP is 1409 mGy-cm.  This CT   exam was performed according to the principle of ALARA (As Low As   Reasonably Achievable) by using one or more of the following dose    reduction techniques: automated exposure control, adjustment of the mA   and or kV according to patient size, and or use of iterative   reconstruction technique.    COMPARISON:  No relevant prior studies available.    FINDINGS:  Lung bases:  Unremarkable.  No mass.  No consolidation.    ABDOMEN:  Liver:  Hepatomegaly.  Gallbladder and bile ducts:  Cholecystectomy.  No ductal dilation.  Pancreas:  Unremarkable.  No mass.  No ductal dilation.  Spleen:  Unremarkable.  No splenomegaly.  Adrenals:  Unremarkable.  No mass.  Kidneys and ureters:  Unremarkable.  No solid mass.  No hydronephrosis.  Stomach and bowel:  Unremarkable.  No mucosal thickening.  No bowel   obstruction.    PELVIS:  Appendix:  Normal appendix.  Bladder:  Unremarkable.  No mass.  Reproductive:  Previous tubal ligation.  Right tubal ligation clip is   appropriately positioned.  Left tubal ligation clip has migrated into the   anterior pelvic cavity.  Unremarkable uterus.    ABDOMEN and PELVIS:  Intraperitoneal space:  Unremarkable.  No free air, significant free   fluid, or fluid collection.  Bones joints:  No acute fracture.  No dislocation.  Soft tissues: Fat-containing periumbilical hernia.  Vasculature:  Unremarkable.  No abdominal aortic aneurysm.  Lymph nodes:  Unremarkable.  No enlarged lymph nodes.    IMPRESSION:       No acute findings in the abdomen or pelvis.      Impression:          Electronically signed by Indra Segovia M.D. on 06-05-24 at 0001    XR Chest 1 View [[id number]] Collected: 06/04/24 2353     Updated: 06/04/24 2353    Narrative:        Patient: ASHOK HUIZAR  Time Out: 23:52  Exam(s): XR CXR 1 VIEW     EXAM:  XR Chest, 1 View    CLINICAL HISTORY:  Reason for exam: cough, fever, chills, n v.    TECHNIQUE:  Frontal view of the chest.    COMPARISON:  No relevant prior studies available.    FINDINGS:  Lungs:  Unremarkable.  No consolidation.  Pleural space:  Unremarkable.  No pleural effusion or pneumothorax.  Heart:   Unremarkable.  No cardiomegaly or pulmonary vascular congestion.  Bones joints:  No acute fracture.  No dislocation.    IMPRESSION:       No evidence of acute cardiopulmonary disease.      Impression:          Electronically signed by Indra Segovia M.D. on 06-04-24 at 2352          Past Medical History:   Diagnosis Date    Abnormal Pap smear of cervix     Migraine     Seizure disorder        Assessment:  Active Hospital Problems    Diagnosis  POA    **Sepsis [A41.9]  Yes    Acute lactic acidosis [E87.21]  Unknown    Cellulitis [L03.90]  Unknown    Migraine [G43.909]  Unknown    Seizure disorder [G40.909]  Unknown    Allergy to sulfa drugs [Z88.2]  Not Applicable    Drug induced fever [R50.2]  Unknown    Dermatitis, unspecified, on abdominal wall [L30.9]  Unknown      Resolved Hospital Problems   No resolved problems to display.       Plan:  The patient is admitted to the hospital for further evaluation treatment with concern for sepsis.  Sounds like she has probably had an allergic reaction to the Bactrim and likely has drug fever.  Cultures were done and she started on broad-spectrum antibiotics.  I am going to give her a dose of Solu-Medrol and ask for infectious disease consult.  Will get some follow-up lab and follow-up on cultures.  I suggest she avoid sulfa drugs in the future.    Nik Muhammad MD  6/5/2024  17:08 EDT     Electronically signed by Nik Muhammad MD at 06/05/24 1712          Emergency Department Notes        Isabelle Pugh, RN at 06/05/24 0135          Nursing report ED to floor  Magdalena Carreon  39 y.o.  female    HPI :  HPI (Adult)  Stated Reason for Visit: pt to er for Abd pain, N?V? D after statrting abx today  History Obtained From: patient    Chief Complaint  Chief Complaint   Patient presents with    Chills    Nausea    Vomiting     Pt is a 39 y.o. female with history of alcohol abuse who presents for nausea and vomiting with fever, chills, generalized bodyaches, headache, and abdominal  cramping that began this evening around 6:37 PM.  Patient states she is also had a slight cough.  States she felt okay this morning.  Denies known sick contacts.  States she was just prescribed Bactrim and she took a dose around 6 PM, had not eaten much prior to taking it, and then went and ate at [employer] and had 1 piece of quesadilla and then started to feel ill.  Denies chest pain, shortness of breath, diarrhea, urinary symptoms, sore throat.  Admits to drinking 3-4 shots of liquor today.      Medical Record Review:  Reviewed pelvic ultrasound from 5/13/2022  IMPRESSION:   1. Normal ultrasound appearance of uterus, uterine endometrial echo complex and ovaries.       PAST MEDICAL HISTORY       Active Ambulatory Problems     Diagnosis Date Noted    No Active Ambulatory Problems           Resolved Ambulatory Problems     Diagnosis Date Noted    No Resolved Ambulatory Problems           Past Medical History:   Diagnosis Date    Abnormal Pap smear of cervix      Migraine      Seizure disorder        Admitting doctor:   Joey Appiah MD    Admitting diagnosis:   The primary encounter diagnosis was Sepsis without acute organ dysfunction, due to unspecified organism. Diagnoses of Cellulitis of other specified site and Acute lactic acidosis were also pertinent to this visit.    Code status:   Current Code Status       Date Active Code Status Order ID Comments User Context       Not on file        Allergies:   Prednisone    Isolation:   Enhanced Droplet/Contact     Intake and Output  No intake or output data in the 24 hours ending 06/05/24 0135    Weight:       06/04/24  2209   Weight: 118 kg (260 lb 2.3 oz)     Most recent vitals:   Vitals:    06/04/24 2316 06/04/24 2337 06/05/24 0001 06/05/24 0101   BP:    114/69   BP Location:       Patient Position:       Pulse: (!) 126 (!) 125  116   Resp: 18   20   Temp:   (!) 101.1 °F (38.4 °C)    TempSrc:   Oral    SpO2: 93% 95%  91%   Weight:       Height:         Active  LDAs/IV Access:   Lines, Drains & Airways       Active LDAs       Name Placement date Placement time Site Days    Peripheral IV 06/04/24 2242 Left Antecubital 06/04/24  2242  Antecubital  less than 1    Peripheral IV 06/05/24 0051 Right Antecubital 06/05/24  0051  Antecubital  less than 1                Labs (abnormal labs have a star):   Labs Reviewed   COMPREHENSIVE METABOLIC PANEL - Abnormal; Notable for the following components:       Result Value    Glucose 107 (*)     CO2 20.4 (*)     All other components within normal limits    Narrative:     GFR Normal >60  Chronic Kidney Disease <60  Kidney Failure <15     URINALYSIS W/ MICROSCOPIC IF INDICATED (NO CULTURE) - Abnormal; Notable for the following components:    Specific Gravity, UA >1.030 (*)     Blood, UA Large (3+) (*)     Protein, UA Trace (*)     Leuk Esterase, UA Trace (*)     All other components within normal limits   CBC WITH AUTO DIFFERENTIAL - Abnormal; Notable for the following components:    RBC 3.23 (*)     Hemoglobin 10.6 (*)     Hematocrit 31.6 (*)     MCV 97.8 (*)     RDW 11.6 (*)     Neutrophil % 94.2 (*)     Lymphocyte % 4.3 (*)     Monocyte % 1.2 (*)     Eosinophil % 0.0 (*)     Lymphocytes, Absolute 0.25 (*)     Monocytes, Absolute 0.07 (*)     All other components within normal limits   LACTIC ACID, PLASMA - Abnormal; Notable for the following components:    Lactate 3.7 (*)     All other components within normal limits   URINALYSIS, MICROSCOPIC ONLY - Abnormal; Notable for the following components:    RBC, UA 3-5 (*)     Squamous Epithelial Cells, UA 3-6 (*)     All other components within normal limits   COVID-19/FLUA&B/RSV, NP SWAB IN TRANSPORT MEDIA 1 HR TAT - Normal    Narrative:     Fact sheet for providers: https://www.fda.gov/media/652624/download    Fact sheet for patients: https://www.fda.gov/media/096313/download    Test performed by PCR.   LIPASE - Normal   HCG, SERUM, QUALITATIVE - Normal   MAGNESIUM - Normal   BLOOD CULTURE    BLOOD CULTURE   RAINBOW DRAW    Narrative:     The following orders were created for panel order [place] Draw.  Procedure                               Abnormality         Status                     ---------                               -----------         ------                     Green Top (Gel)[[id number]]                                  Final result               Lavender Top[[id number]]                                     Final result               Gold Top - SST[[id number]]                                                              Light Blue Top[[id number]]                                   Final result                 Please view results for these tests on the individual orders.   ETHANOL   LACTIC ACID, REFLEX   PROCALCITONIN   CBC AND DIFFERENTIAL    Narrative:     The following orders were created for panel order CBC & Differential.  Procedure                               Abnormality         Status                     ---------                               -----------         ------                     CBC Auto Differential[[id number]]        Abnormal            Final result                 Please view results for these tests on the individual orders.   GREEN TOP   LAVENDER TOP   LIGHT BLUE TOP     EKG:   No orders to display     Meds given in ED:   Medications   sodium chloride 0.9 % flush 10 mL (has no administration in time range)   lactated ringers bolus 1,000 mL (has no administration in time range)   Pharmacy to dose vancomycin (has no administration in time range)   vancomycin 2250 mg/500 mL 0.9% NS IVPB ([facility]) (has no administration in time range)   Vancomycin HCl 1,250 mg in sodium chloride 0.9 % 250 mL VTB (has no administration in time range)   ondansetron (ZOFRAN) injection 4 mg (4 mg Intravenous Given 6/4/24 2242)   acetaminophen (TYLENOL) tablet 1,000 mg (1,000 mg Oral Given 6/4/24 2241)   lactated ringers bolus 1,000 mL (1,000 mL Intravenous New Bag 6/4/24 2330)   iopamidol (ISOVUE-300)  61 % injection 100 mL (85 mL Intravenous Given 6/4/24 2321)   morphine injection 4 mg (4 mg Intravenous Given 6/5/24 0013)   ondansetron (ZOFRAN) injection 4 mg (4 mg Intravenous Given 6/5/24 0013)   piperacillin-tazobactam (ZOSYN) 3.375 g IVPB in 100 mL NS MBP (CD) (3.375 g Intravenous New Bag 6/5/24 0052)     Imaging results:  CT Abdomen Pelvis With Contrast    Result Date: 6/5/2024  Electronically signed by Indra Segovia M.D. on 06-05-24 at 0001    XR Chest 1 View    Result Date: 6/4/2024  Electronically signed by Indra Segovia M.D. on 06-04-24 at 2352     Ambulatory status:   - with assist x1    Social issues:   Social History     Socioeconomic History    Marital status: Single   Tobacco Use    Smoking status: Light Smoker   Substance and Sexual Activity    Drug use: Yes     Types: Marijuana    Sexual activity: Yes     Peripheral Neurovascular  Peripheral Neurovascular (Adult)  Peripheral Neurovascular WDL: WDL    Neuro Cognitive  Neuro Cognitive (Adult)  Cognitive/Neuro/Behavioral WDL: .WDL except  Additional Documentation: (S) Dizziness Assessment (Group), Headache Assessment (Group) (hx of migraines)  Headache Assessment  Headache Location: generalized  Description/Character: sharp  Associated Signs/Symptoms: dizziness, photophobia, vomiting, nausea  Dizziness Assessment  Dizziness Reported Symptoms: (S) constant  Dizziness Occurs With:  (opening eyes)    Learning  Learning Assessment (Adult)  Learning Readiness and Ability: no barriers identified  Education Provided  Person Taught: patient    Respiratory  Respiratory WDL  Respiratory WDL: (S) .WDL except, cough  Cough Frequency: frequent  Cough Type: (S) productive    Abdominal Pain     Pain Assessments  Pain (Adult)  (0-10) Pain Rating: Rest: 5  (0-10) Pain Rating: Activity: 0  Response to Pain Interventions: nonverbal indicators absent/decreased    NIH Stroke Scale     Shyam Foster RN  06/05/24 01:35 EDT     Electronically signed by Isabelle Pugh RN at  06/05/24 0141       Eleazar Nelson MD at 06/04/24 2322        Procedure Orders    1. Critical Care [[id number]] ordered by Oliva Reynaga PA-C MD ATTESTATION NOTE    The ANAYELI and I have discussed this patient's history, physical exam, and treatment plan.  I have reviewed the documentation and personally had a face to face interaction with the patient. I affirm the documentation and agree with the treatment and plan.  The attached note describes my personal findings.      I provided a substantive portion of the care of the patient.  I personally performed the physical exam in its entirety, and below are my findings.      Brief HPI: This patient is a 39-year-old female presenting to the emergency room today with fevers and chills as well as generalized bodyaches, abdominal pain, as well as nausea and vomiting that began earlier this evening.  She does have a nonproductive cough but denies shortness of breath, chest pain, diarrhea, or dysuria/hematuria.      PHYSICAL EXAM  ED Triage Vitals [06/04/24 2209]   Temp Heart Rate Resp BP SpO2   (!) 102.2 °F (39 °C) (!) 136 18 (!) 195/117 92 %      Temp src Heart Rate Source Patient Position BP Location FiO2 (%)   Tympanic Monitor Sitting Right arm --         GENERAL: Resting comfortably and in no acute distress, nontoxic in appearance  HENT: nares patent  EYES: no scleral icterus  CV: regular rhythm, tachycardic, no M/R/G  RESPIRATORY: normal effort, lungs clear bilaterally  ABDOMEN: soft, nontender, no rebound or guarding  MUSCULOSKELETAL: no deformity, no edema  NEURO: alert, moves all extremities, follows commands  PSYCH:  calm, cooperative  SKIN: warm, dry, mild erythema present to the lower abdomen with warmth to the touch    Vital signs and nursing notes reviewed.        Differential diagnosis includes but is not limited to sepsis, septic shock, urinary tract infection, pneumonia, COVID-19, viral upper respiratory infection, acute bronchitis,  acute diverticulitis, acute cholecystitis, acute appendicitis.      Plan: This patient is certainly probably septic given her multiple positive SIRS criteria.  We will begin IV fluids as well as antipyretics and antiemetics as we obtain labs, urinalysis, chest x-ray, as well as CT scan of the abdomen and pelvis.  Will monitor and reassess following.      Chest x-ray independently interpreted by myself with my interpretation showing no cardiomegaly no area of edema, infiltrate, or pneumothorax.      2325  We are certainly concerned for sepsis given the patient's multiple positive SIRS criteria.  Currently her WBC count is normal as is her chest x-ray.  We are awaiting the remainder of her labs as well as CT scans to hopefully determine the source of infection.  We will monitor closely.    0120  The patient's presentation, workup, as well as diagnosis and treatment plan was discussed at length with ISAAC Timmons for [facility].  She agrees to admit the patient to the hospital today for Dr. Tamez.        Critical Care    Performed by: Oliva Reynaga PA-C  Authorized by: Eleazar Nelson MD    Critical care provider statement:     Critical care time (minutes):  32    Critical care time was exclusive of:  Separately billable procedures and treating other patients    Critical care was necessary to treat or prevent imminent or life-threatening deterioration of the following conditions:  Sepsis    Critical care was time spent personally by me on the following activities:  Blood draw for specimens, discussions with consultants, development of treatment plan with patient or surrogate, evaluation of patient's response to treatment, examination of patient, obtaining history from patient or surrogate, ordering and performing treatments and interventions, ordering and review of laboratory studies, ordering and review of radiographic studies, pulse oximetry, re-evaluation of patient's condition and review of old  charts    Care discussed with: admitting provider             Eleazar Nelson MD  06/05/24 0124      Electronically signed by Eleazar Nelson MD at 06/05/24 0124       Oliva Reynaga PA-C at 06/04/24 2240       Attestation signed by Eleazar Nelson MD at 06/05/24 0328    MD ATTESTATION NOTE    SHARED VISIT: This visit was performed by BOTH a physician and an APC. The substantive portion of the medical decision making was performed by this attesting physician who made or approved the management plan and takes responsibility for patient management. All studies in the APC note (if performed) were independently interpreted by me.     See my separate note for personal assessment and findings.    Eleazar Nelson MD                Date seen: 6/5/2024        History provided by: Patient        Chief Complaints: Nausea and vomiting    HPI:  Pt is a 39 y.o. female with history of alcohol abuse who presents for nausea and vomiting with fever, chills, generalized bodyaches, headache, and abdominal cramping that began this evening around 6:30-7:00 PM.  Patient states she has also had a slight cough.  States she felt okay this morning.  Denies known sick contacts.  States she was just prescribed Bactrim for abdominal wall cellulitis and she took a dose around 6 PM, had not eaten much prior to taking it, and then [facility] and had 1 piece of quesadilla and then started to feel ill.  Denies chest pain, shortness of breath, diarrhea, urinary symptoms, sore throat.  Admits to drinking 3-4 shots of liquor today.          Medical Record Review:  Reviewed pelvic ultrasound from 5/13/2022  IMPRESSION:   1. Normal ultrasound appearance of uterus, uterine endometrial echo complex and ovaries.       PAST MEDICAL HISTORY  Active Ambulatory Problems     Diagnosis Date Noted    No Active Ambulatory Problems     Resolved Ambulatory Problems     Diagnosis Date Noted    No Resolved Ambulatory Problems      Past Medical History:   Diagnosis Date    Abnormal Pap smear of cervix     Migraine     Seizure disorder          PAST SURGICAL HISTORY  Past Surgical History:   Procedure Laterality Date    CEREBRAL ANEURYSM REPAIR           FAMILY HISTORY  Family History   Problem Relation Age of Onset    Melanoma Father     Diabetes Father     Pancreatic cancer Paternal Grandmother     Diabetes Paternal Grandmother     Diabetes Maternal Grandmother          SOCIAL HISTORY  Social History     Socioeconomic History    Marital status: Single   Tobacco Use    Smoking status: Light Smoker   Substance and Sexual Activity    Drug use: Yes     Types: Marijuana    Sexual activity: Yes         ALLERGIES  Prednisone            PHYSICAL EXAM  ED Triage Vitals [06/04/24 2209]   Temp Heart Rate Resp BP SpO2   (!) 102.2 °F (39 °C) (!) 136 18 (!) 195/117 92 %      Temp src Heart Rate Source Patient Position BP Location FiO2 (%)   Tympanic Monitor Sitting Right arm --       Physical Exam  Vitals reviewed.   Constitutional:       General: She is not in acute distress.     Appearance: Normal appearance. She is ill-appearing.   HENT:      Head: Normocephalic.      Nose: Nose normal.      Mouth/Throat:      Mouth: Mucous membranes are moist.   Eyes:      Extraocular Movements: Extraocular movements intact.      Conjunctiva/sclera: Conjunctivae normal.   Cardiovascular:      Rate and Rhythm: Regular rhythm. Tachycardia present.      Pulses: Normal pulses.   Pulmonary:      Effort: Pulmonary effort is normal. No respiratory distress.      Breath sounds: Normal breath sounds.   Abdominal:      General: There is no distension.      Palpations: Abdomen is soft.      Tenderness: There is no abdominal tenderness. There is no guarding.   Musculoskeletal:         General: No swelling or deformity. Normal range of motion.      Cervical back: Normal range of motion. No rigidity.      Right lower leg: No edema.      Left lower leg: No edema.   Skin:      General: Skin is warm.   Neurological:      General: No focal deficit present.      Mental Status: She is alert and oriented to person, place, and time.   Psychiatric:         Mood and Affect: Mood normal.         Vital signs and nursing notes reviewed.            Differential Diagnosis includes but not limited to: COVID-19, influenza, pneumonia, gastroenteritis          LAB RESULTS  Recent Results (from the past 24 hour(s))   Comprehensive Metabolic Panel    Collection Time: 06/04/24 10:35 PM    Specimen: Blood   Result Value Ref Range    Glucose 107 (H) 65 - 99 mg/dL    BUN 8 6 - 20 mg/dL    Creatinine 0.71 0.57 - 1.00 mg/dL    Sodium 141 136 - 145 mmol/L    Potassium 3.9 3.5 - 5.2 mmol/L    Chloride 107 98 - 107 mmol/L    CO2 20.4 (L) 22.0 - 29.0 mmol/L    Calcium 9.2 8.6 - 10.5 mg/dL    Total Protein 7.0 6.0 - 8.5 g/dL    Albumin 4.1 3.5 - 5.2 g/dL    ALT (SGPT) 29 1 - 33 U/L    AST (SGOT) 24 1 - 32 U/L    Alkaline Phosphatase 102 39 - 117 U/L    Total Bilirubin <0.2 0.0 - 1.2 mg/dL    Globulin 2.9 gm/dL    A/G Ratio 1.4 g/dL    BUN/Creatinine Ratio 11.3 7.0 - 25.0    Anion Gap 13.6 5.0 - 15.0 mmol/L    eGFR 109.7 >60.0 mL/min/1.73   Lipase    Collection Time: 06/04/24 10:35 PM    Specimen: Blood   Result Value Ref Range    Lipase 41 13 - 60 U/L   Green Top (Gel)    Collection Time: 06/04/24 10:35 PM   Result Value Ref Range    Extra Tube Hold for add-ons.    Lavender Top    Collection Time: 06/04/24 10:35 PM   Result Value Ref Range    Extra Tube hold for add-on    Light Blue Top    Collection Time: 06/04/24 10:35 PM   Result Value Ref Range    Extra Tube Hold for add-ons.    CBC Auto Differential    Collection Time: 06/04/24 10:35 PM    Specimen: Blood   Result Value Ref Range    WBC 5.80 3.40 - 10.80 10*3/mm3    RBC 3.23 (L) 3.77 - 5.28 10*6/mm3    Hemoglobin 10.6 (L) 12.0 - 15.9 g/dL    Hematocrit 31.6 (L) 34.0 - 46.6 %    MCV 97.8 (H) 79.0 - 97.0 fL    MCH 32.8 26.6 - 33.0 pg    MCHC 33.5 31.5 - 35.7 g/dL     RDW 11.6 (L) 12.3 - 15.4 %    RDW-SD 41.7 37.0 - 54.0 fl    MPV 9.4 6.0 - 12.0 fL    Platelets 243 140 - 450 10*3/mm3    Neutrophil % 94.2 (H) 42.7 - 76.0 %    Lymphocyte % 4.3 (L) 19.6 - 45.3 %    Monocyte % 1.2 (L) 5.0 - 12.0 %    Eosinophil % 0.0 (L) 0.3 - 6.2 %    Basophil % 0.0 0.0 - 1.5 %    Immature Grans % 0.3 0.0 - 0.5 %    Neutrophils, Absolute 5.46 1.70 - 7.00 10*3/mm3    Lymphocytes, Absolute 0.25 (L) 0.70 - 3.10 10*3/mm3    Monocytes, Absolute 0.07 (L) 0.10 - 0.90 10*3/mm3    Eosinophils, Absolute 0.00 0.00 - 0.40 10*3/mm3    Basophils, Absolute 0.00 0.00 - 0.20 10*3/mm3    Immature Grans, Absolute 0.02 0.00 - 0.05 10*3/mm3    nRBC 0.0 0.0 - 0.2 /100 WBC   hCG, Serum, Qualitative    Collection Time: 06/04/24 10:35 PM    Specimen: Blood   Result Value Ref Range    HCG Qualitative Negative Negative   Lactic Acid, Plasma    Collection Time: 06/04/24 10:35 PM    Specimen: Blood   Result Value Ref Range    Lactate 3.7 (C) 0.5 - 2.0 mmol/L   Magnesium    Collection Time: 06/04/24 10:35 PM    Specimen: Blood   Result Value Ref Range    Magnesium 1.9 1.6 - 2.6 mg/dL   Ethanol    Collection Time: 06/04/24 10:35 PM    Specimen: Blood   Result Value Ref Range    Ethanol <10 0 - 10 mg/dL    Ethanol % <0.010 %   Procalcitonin    Collection Time: 06/04/24 10:35 PM    Specimen: Blood   Result Value Ref Range    Procalcitonin 0.21 0.00 - 0.25 ng/mL   COVID-19, FLU A/B, RSV PCR 1 HR TAT - Swab, Nasopharynx    Collection Time: 06/04/24 11:28 PM    Specimen: Nasopharynx; Swab   Result Value Ref Range    COVID19 Not Detected Not Detected - Ref. Range    Influenza A PCR Not Detected Not Detected    Influenza B PCR Not Detected Not Detected    RSV, PCR Not Detected Not Detected   Urinalysis With Microscopic If Indicated (No Culture) - Urine, Clean Catch    Collection Time: 06/05/24 12:08 AM    Specimen: Urine, Clean Catch   Result Value Ref Range    Color, UA Yellow Yellow, Straw    Appearance, UA Clear Clear    pH, UA  5.5 5.0 - 8.0    Specific Gravity, UA >1.030 (H) 1.005 - 1.030    Glucose, UA Negative Negative    Ketones, UA Negative Negative    Bilirubin, UA Negative Negative    Blood, UA Large (3+) (A) Negative    Protein, UA Trace (A) Negative    Leuk Esterase, UA Trace (A) Negative    Nitrite, UA Negative Negative    Urobilinogen, UA 1.0 E.U./dL 0.2 - 1.0 E.U./dL   Urinalysis, Microscopic Only - Urine, Clean Catch    Collection Time: 06/05/24 12:08 AM    Specimen: Urine, Clean Catch   Result Value Ref Range    RBC, UA 3-5 (A) None Seen, 0-2 /HPF    WBC, UA 0-2 None Seen, 0-2 /HPF    Bacteria, UA None Seen None Seen /HPF    Squamous Epithelial Cells, UA 3-6 (A) None Seen, 0-2 /HPF    Yeast, UA Small/1+ Budding Yeast None Seen /HPF    Hyaline Casts, UA None Seen None Seen /LPF    Waxy Casts 0-2 None Seen /LPF    Methodology Automated Microscopy        Ordered the above labs and reviewed the results.          RADIOLOGY  CT Abdomen Pelvis With Contrast    Result Date: 6/5/2024  Patient: ASHOK HUIZAR  Time Out: 00:01 Exam(s): CT ABDOMEN + PELVIS With Contrast EXAM: CT Abdomen and Pelvis With Intravenous Contrast CLINICAL HISTORY: Reason for exam: generalized abdominal pain, nausea, vomiting, fever, tachycardia. TECHNIQUE: Axial computed tomography images of the abdomen and pelvis with intravenous contrast.  CTDI is 30 mGy and DLP is 1409 mGy-cm.  This CT exam was performed according to the principle of ALARA (As Low As Reasonably Achievable) by using one or more of the following dose reduction techniques: automated exposure control, adjustment of the mA and or kV according to patient size, and or use of iterative reconstruction technique. COMPARISON: No relevant prior studies available. FINDINGS: Lung bases:  Unremarkable.  No mass.  No consolidation. ABDOMEN: Liver:  Hepatomegaly. Gallbladder and bile ducts:  Cholecystectomy.  No ductal dilation. Pancreas:  Unremarkable.  No mass.  No ductal dilation. Spleen:  Unremarkable.  No  splenomegaly. Adrenals:  Unremarkable.  No mass. Kidneys and ureters:  Unremarkable.  No solid mass.  No hydronephrosis. Stomach and bowel:  Unremarkable.  No mucosal thickening.  No bowel obstruction. PELVIS: Appendix:  Normal appendix. Bladder:  Unremarkable.  No mass. Reproductive:  Previous tubal ligation.  Right tubal ligation clip is appropriately positioned.  Left tubal ligation clip has migrated into the anterior pelvic cavity.  Unremarkable uterus. ABDOMEN and PELVIS: Intraperitoneal space:  Unremarkable.  No free air, significant free fluid, or fluid collection. Bones joints:  No acute fracture.  No dislocation. Soft tissues: Fat-containing periumbilical hernia. Vasculature:  Unremarkable.  No abdominal aortic aneurysm. Lymph nodes:  Unremarkable.  No enlarged lymph nodes. IMPRESSION:     No acute findings in the abdomen or pelvis.     Electronically signed by Indra Segovia M.D. on 06-05-24 at 0001    XR Chest 1 View    Result Date: 6/4/2024  Patient: ASHOK HUIZAR  Time Out: 23:52 Exam(s): XR CXR 1 VIEW EXAM: XR Chest, 1 View CLINICAL HISTORY: Reason for exam: cough, fever, chills, n v. TECHNIQUE: Frontal view of the chest. COMPARISON: No relevant prior studies available. FINDINGS: Lungs:  Unremarkable.  No consolidation. Pleural space:  Unremarkable.  No pleural effusion or pneumothorax. Heart:  Unremarkable.  No cardiomegaly or pulmonary vascular congestion. Bones joints:  No acute fracture.  No dislocation. IMPRESSION:     No evidence of acute cardiopulmonary disease.     Electronically signed by Indra Segovia M.D. on 06-04-24 at 2352     Ordered the above noted radiological studies. Reviewed by me in PACS.      - My independent interpretation of CXR:  no pneumothorax                  Medications given in ED:   Medications   sodium chloride 0.9 % flush 10 mL (has no administration in time range)   lactated ringers bolus 1,000 mL (has no administration in time range)   Pharmacy to dose vancomycin (has no  administration in time range)   vancomycin 2250 mg/500 mL 0.9% NS IVPB ([facility]) (has no administration in time range)   Vancomycin HCl 1,250 mg in sodium chloride 0.9 % 250 mL VTB (has no administration in time range)   ondansetron (ZOFRAN) injection 4 mg (4 mg Intravenous Given 6/4/24 2242)   acetaminophen (TYLENOL) tablet 1,000 mg (1,000 mg Oral Given 6/4/24 2241)   lactated ringers bolus 1,000 mL (1,000 mL Intravenous New Bag 6/4/24 2330)   iopamidol (ISOVUE-300) 61 % injection 100 mL (85 mL Intravenous Given 6/4/24 2321)   morphine injection 4 mg (4 mg Intravenous Given 6/5/24 0013)   ondansetron (ZOFRAN) injection 4 mg (4 mg Intravenous Given 6/5/24 0013)   piperacillin-tazobactam (ZOSYN) 3.375 g IVPB in 100 mL NS MBP (CD) (0 g Intravenous Stopped 6/5/24 0152)             Procedures:   Procedures                     Progress Notes/ED Course:   ED Course as of 06/05/24 0158   Tue Jun 04, 2024 2330 I spoke with Dr. Nelson about patient, agrees with plan of care.   [SA]   Wed Jun 05, 2024 0008 CT Abdomen Pelvis With Contrast [SA]   0008 XR Chest 1 View  CT abdomen pelvis and chest x-ray negative for acute findings, no infectious process identified at this time.  Patient meeting severe sepsis criteria.  Lactic acid 3.7.  White blood cell count within normal limits.  Urinalysis and COVID-19 swab pending at this time.  Will start broad-spectrum antibiotics. [SA]   0157 Pt re-checked.  Tested negative for COVID-19, influenza, RSV.  UA negative for acute infectious process.  Will admit for severe sepsis presumably abdominal wall cellulitis which she was being treated for outpatient with Bactrim.  Labs significant for lactic acidosis, presented febrile with tachycardia.  Temperature and heart rate improving with Tylenol and IV fluids.  Blood pressure stable.  Discussed need for admission, reviewed treatment plan and reason for admission with pt/family and admitting physician.  Pt/family voiced understanding of the plan  for admission for further testing/treatment as needed.    [SA]      ED Course User Index  [SA] Oliva Reynaga PA-C                 Critical care excluding treating other patients and other billable procedures, 16 minutes.   My critical care time is exclusively of critical care time provided by MD and did not run concurrent.               DIAGNOSIS  Final diagnoses:   Sepsis without acute organ dysfunction, due to unspecified organism   Cellulitis of other specified site   Acute lactic acidosis                 Disposition:   ED Disposition       ED Disposition   Decision to Admit    Condition   --    Comment   Level of Care: Telemetry [5]   Diagnosis: Sepsis [[id number]]   Admitting Physician: DENISE PEREZ [[id number]]   Attending Physician: DENISE PEREZ [[id number]]   Certification: I Certify That Inpatient Hospital Services Are Medically Necessary For Greater Than 2 Midnights                       Latest Documented Vital Signs:  As of 01:58 EDT  BP- 114/69 HR- 116 Temp- (!) 101.1 °F (38.4 °C) (Oral) O2 sat- 91%      --        Provider Attestation:   I personally reviewed the past medical history, past surgical history, social history, family history, current medications, and allergies as they appear in the chart.    The patient was seen and examined by myself and Dr. Nelson who agrees with plan.      Please note that portions of this were completed with a voice recognition program.     Note Disclaimer: At [facility], we believe that sharing information builds trust and better relationships. You are receiving this note because you are receiving care at [facility] or recently visited. It is possible you will see health information before a provider has talked with you about it. This kind of information can be easy to misunderstand. To help you fully understand what it means for your health, we urge you to discuss this note with your provider.        Provider note signed by:       Oliva Reynaga  MINI  06/05/24 0159      Electronically signed by Eleazar Nelson MD at 06/05/24 0359       Oxygen Therapy (since admission)       Date/Time SpO2 Device (Oxygen Therapy) Flow (L/min) Oxygen Concentration (%) ETCO2 (mmHg)    06/06/24 0731 -- room air -- -- --    06/05/24 2318 94 -- -- -- --    06/05/24 2122 -- room air -- -- --    06/05/24 2020 95 room air -- -- --    06/05/24 1334 97 room air -- -- --    06/05/24 0753 -- room air -- -- --    06/05/24 0242 -- room air -- -- --    06/05/24 0201 95 room air -- -- --    06/05/24 0101 91 room air -- -- --    06/04/24 2337 95 -- -- -- --    06/04/24 2316 93 room air -- -- --    06/04/24 2220 94 -- -- -- --    06/04/24 2209 92 room air -- -- --          Intake & Output (last 3 days)         06/03 0701  06/04 0700 06/04 0701  06/05 0700 06/05 0701  06/06 0700 06/06 0701  06/07 0700    P.O.   778     IV Piggyback  1100      Total Intake(mL/kg)  1100 (9.3) 778 (6.6)     Net  +1100 +778             Urine Unmeasured Occurrence  1 x 11 x 0 x    Stool Unmeasured Occurrence   1 x            Lines, Drains & Airways       Active LDAs       Name Placement date Placement time Site Days    Peripheral IV 06/04/24 2242 Left Antecubital 06/04/24  2242  Antecubital  1    Peripheral IV 06/05/24 0051 Right Antecubital 06/05/24  0051  Antecubital  1              Inactive LDAs       None                  Medication Administration Report for Magdalena Carreon as of 6/4/24 through 6/6/24     Legend:    Given Hold Not Given Due Canceled Entry Other Actions    Time Time (Time) Time Time-Action         Discontinued     Completed     Future     MAR Hold     Linked             Medications 06/04/24 06/05/24 06/06/24      acetaminophen (TYLENOL) tablet 650 mg  Dose: 650 mg  Freq: Every 4 Hours PRN Route: PO  PRN Reason: Mild Pain  Start: 06/05/24 0252     Admin Instructions:   If given for fever, use fever parameter: fever greater than 100.4 °F  Based on patient request - if ordered for moderate or  severe pain, provider allows for administration of a medication prescribed for a lower pain scale.    Do not exceed 4 grams of acetaminophen in a 24 hr period. Max dose of 2gm for AST/ALT greater than 120 units/L.    If given for pain, use the following pain scale:   Mild Pain = Pain Score of 1-3, CPOT 1-2  Moderate Pain = Pain Score of 4-6, CPOT 3-4  Severe Pain = Pain Score of 7-10, CPOT 5-8       0446-Given     0853-Given     1420-Given           Or   acetaminophen (TYLENOL) 160 MG/5ML oral solution 650 mg  Dose: 650 mg  Freq: Every 4 Hours PRN Route: PO  PRN Reason: Mild Pain  Start: 06/05/24 0252     Admin Instructions:   If given for fever, use fever parameter: fever greater than 100.4 °F  Based on patient request - if ordered for moderate or severe pain, provider allows for administration of a medication prescribed for a lower pain scale.    Do not exceed 4 grams of acetaminophen in a 24 hr period. Max dose of 2gm for AST/ALT greater than 120 units/L.    If given for pain, use the following pain scale:   Mild Pain = Pain Score of 1-3, CPOT 1-2  Moderate Pain = Pain Score of 4-6, CPOT 3-4  Severe Pain = Pain Score of 7-10, CPOT 5-8       0446-Not Given:  See Alt     0853-Not Given:  See Alt     1420-Not Given:  See Alt           Or   acetaminophen (TYLENOL) suppository 650 mg  Dose: 650 mg  Freq: Every 4 Hours PRN Route: RE  PRN Reason: Mild Pain  Start: 06/05/24 0252     Admin Instructions:   If given for fever, use fever parameter: fever greater than 100.4 °F  Based on patient request - if ordered for moderate or severe pain, provider allows for administration of a medication prescribed for a lower pain scale.    Do not exceed 4 grams of acetaminophen in a 24 hr period. Max dose of 2gm for AST/ALT greater than 120 units/L.    If given for pain, use the following pain scale:   Mild Pain = Pain Score of 1-3, CPOT 1-2  Moderate Pain = Pain Score of 4-6, CPOT 3-4  Severe Pain = Pain Score of 7-10, CPOT 5-8        0446-Not Given:  See Alt     0853-Not Given:  See Alt     1420-Not Given:  See Alt            sennosides-docusate (PERICOLACE) 8.6-50 MG per tablet 2 tablet  Dose: 2 tablet  Freq: 2 Times Daily PRN Route: PO  PRN Reason: Constipation  Start: 06/05/24 0252     Admin Instructions:   Start bowel management regimen if patient has not had a bowel movement after 12 hours.           And   polyethylene glycol (MIRALAX) packet 17 g  Dose: 17 g  Freq: Daily PRN Route: PO  PRN Reason: Constipation  PRN Comment: Use if senna-docusate is ineffective  Start: 06/05/24 0252     Admin Instructions:   Use if no bowel movement after 12 hours. Mix in 6-8 ounces of water.  Use 4-8 ounces of water, tea, or juice for each 17 gram dose.           And   bisacodyl (DULCOLAX) EC tablet 5 mg  Dose: 5 mg  Freq: Daily PRN Route: PO  PRN Reason: Constipation  PRN Comment: Use if polyethylene glycol is ineffective  Start: 06/05/24 0252     Admin Instructions:   Use if no bowel movement after 12 hours.  Swallow whole. Do not crush, split, or chew tablet.           And   bisacodyl (DULCOLAX) suppository 10 mg  Dose: 10 mg  Freq: Daily PRN Route: RE  PRN Reason: Constipation  PRN Comment: Use if bisacodyl oral is ineffective  Start: 06/05/24 0252     Admin Instructions:   Use if no bowel movement after 12 hours.  Hold for diarrhea           butalbital-acetaminophen-caffeine (FIORICET, ESGIC) -40 MG per tablet 1 tablet  Dose: 1 tablet  Freq: Every 4 Hours PRN Route: PO  PRN Reason: Headache  Start: 06/05/24 1657     Admin Instructions:   Do not exceed 4g of acetaminophen in a 24 hour period.  Maximum 6 tablets per 24 hours. Max dose of 2gm for AST/ALT greater than 120 units/L       1713-Given          0210-Given            calcium carbonate (TUMS) chewable tablet 500 mg (200 mg elemental)  Dose: 2 tablet  Freq: 2 Times Daily PRN Route: PO  PRN Reason: Heartburn  Start: 06/05/24 0252     Admin Instructions:   One tablet contains 200 mg elemental  calcium.  Take with food.           ceFAZolin 2000 mg IVPB in 100 mL NS (MBP)  Dose: 2,000 mg  Freq: Every 8 Hours Route: IV  Indications of Use: SKIN AND SOFT TISSUE INFECTION  Start: 06/06/24 0815 End: 06/11/24 0814     Admin Instructions:   Caution: Look alike/sound alike drug alert        0815     1615           fluconazole (DIFLUCAN) tablet 200 mg  Dose: 200 mg  Freq: Every 24 Hours Route: PO  Indications of Use: CUTANEOUS CANDIDIASIS  Start: 06/06/24 0815 End: 06/11/24 0814     Admin Instructions:   Group 2 (Pink) Hazardous Drug - Reproductive Risk Only - See Handling Guide        0815            folic acid (FOLVITE) tablet 1 mg  Dose: 1 mg  Freq: Daily Route: PO  Start: 06/05/24 1145       1249-Given          0900            HYDROcodone-acetaminophen (NORCO) 5-325 MG per tablet 1 tablet  Dose: 1 tablet  Freq: Every 6 Hours PRN Route: PO  PRN Reason: Moderate Pain  Start: 06/05/24 1658 End: 06/10/24 1657     Admin Instructions:   Based on patient request - if ordered for moderate or severe pain, provider allows for administration of a medication prescribed for a lower pain scale.  [JULES]    Do not exceed 4 grams of acetaminophen in a 24 hr period. Max dose of 2gm for AST/ALT greater than 120 units/L        If given for pain, use the following pain scale:   Mild Pain = Pain Score of 1-3, CPOT 1-2  Moderate Pain = Pain Score of 4-6, CPOT 3-4  Severe Pain = Pain Score of 7-10, CPOT 5-8        0743-Given            HYDROmorphone (DILAUDID) injection 0.5 mg  Dose: 0.5 mg  Freq: Every 2 Hours PRN Route: IV  PRN Reason: Severe Pain  Start: 06/05/24 1658 End: 06/10/24 1657     Admin Instructions:   Based on patient request - if ordered for moderate or severe pain, provider allows for administration of a medication prescribed for a lower pain scale.  If given for pain, use the following pain scale:  Mild Pain = Pain Score of 1-3, CPOT 1-2  Moderate Pain = Pain Score of 4-6, CPOT 3-4  Severe Pain = Pain Score of 7-10,  CPOT 5-8           lactated ringers bolus 1,000 mL  Dose: 1,000 mL  Freq: Once Route: IV  Start: 06/05/24 0010       0735-Stopped [C]     (0736)-Not Given [C]            levETIRAcetam (KEPPRA) tablet 1,000 mg  Dose: 1,000 mg  Freq: 2 Times Daily Route: PO  Start: 06/05/24 1145     Admin Instructions:   Mucous membrane irritant. Do not crush or chew tablet or capsule unless administered through a feeding tube.  For tube route administration, disperse crushed tablets in 10 mL of water, shake for 5 minutes to dissolve, and administer immediately via enteral feeding tube.    Caution: Look alike/sound alike drug alert.       1249-Given     2027-Given         0900 2100            LORazepam (ATIVAN) tablet 1 mg  Dose: 1 mg  Freq: Every 1 Hour PRN Route: PO  PRN Reason: Withdrawal  PRN Comment: For CIWA-Ar 8-10  Start: 06/05/24 1054 End: 06/10/24 1053     Admin Instructions:   Reassess 1 Hour After Administration   Caution: Look alike/sound alike drug alert           Or   midazolam (VERSED) injection 2 mg  Dose: 2 mg  Freq: Every 1 Hour PRN Route: IV  PRN Comment: For CIWA-Ar 8-10  Start: 06/05/24 1054 End: 06/10/24 1053     Admin Instructions:   Reassess 1 Hour After Administration             Or   LORazepam (ATIVAN) tablet 2 mg  Dose: 2 mg  Freq: Every 1 Hour PRN Route: PO  PRN Reason: Withdrawal  PRN Comment: For CIWA-Ar 11-15 or -160, DBP , -125  Start: 06/05/24 1054 End: 06/10/24 1053     Admin Instructions:   Reassess 1 Hour After Administration   Caution: Look alike/sound alike drug alert           Or   midazolam (VERSED) injection 4 mg  Dose: 4 mg  Freq: Every 1 Hour PRN Route: IV  PRN Comment: For CIWA-Ar 11-15 or -160, DBP , -125  Start: 06/05/24 1054 End: 06/10/24 1053     Admin Instructions:   Reassess 1 Hour After Administration             Or   midazolam (VERSED) injection 4 mg  Dose: 4 mg  Freq: Every 15 Minutes PRN Route: IV  PRN Comment: For CIWA-Ar Greater Than 15  "or SBP greater than 160, DBP greater than 110, or HR greater than 125  Start: 06/05/24 1054 End: 06/10/24 1053     Admin Instructions:   Reassess 15 Minutes After Each Administration.  If CIWA-Ar Does Not Decrease Contact Provider To Discuss Transfer to Higher Level of Care.             Or   midazolam (VERSED) injection 4 mg  Dose: 4 mg  Freq: Every 15 Minutes PRN Route: IM  PRN Comment: If Unable to Administer IV - For CIWA-Ar Greater Than 15 or SBP greater than 160, DBP greater than 110, or HR greater than 125  Start: 06/05/24 1054 End: 06/10/24 1053     Admin Instructions:   Reassess 15 Minutes After Each Administration.  If CIWA-Ar Does Not Decrease Contact Provider To Discuss Transfer to Higher Level of Care.             Magnesium Standard Dose Replacement - Follow Nurse / BPA Driven Protocol  Freq: As Needed Route: XX  PRN Reason: Other  Start: 06/05/24 1052     Admin Instructions:   Open Order & Select \"[facility] Electrolyte Replacement Protocol Algorithm\" to View Details           nicotine (NICODERM CQ) 21 MG/24HR patch 1 patch  Dose: 1 patch  Freq: Every 24 Hours Scheduled Route: TD  Start: 06/05/24 0900     Admin Instructions:   Apply to clean, dry, nonhairy area of skin (typically upper arm or shoulder)  Dispose of nicotine replacement therapies and their wrappers in non-hazardous pharmaceutical waste or in regular trash.       0853-Medication Applied          0853 (Medication Removed)     0900           nitroglycerin (NITROSTAT) SL tablet 0.4 mg  Dose: 0.4 mg  Freq: Every 5 Minutes PRN Route: SL  PRN Reason: Chest Pain  PRN Comment: Only if SBP Greater Than 100  Start: 06/05/24 0252     Admin Instructions:   If Pain Unrelieved After 3 Doses Notify MD  May administer up to 3 doses per episode.            ondansetron ODT (ZOFRAN-ODT) disintegrating tablet 4 mg  Dose: 4 mg  Freq: Every 6 Hours PRN Route: PO  PRN Reasons: Nausea,Vomiting  Start: 06/05/24 0252     Admin Instructions:   If BOTH ondansetron (ZOFRAN) " and promethazine (PHENERGAN) are ordered use ondansetron first and THEN promethazine IF ondansetron is ineffective.  Place on tongue and allow to dissolve.           Or   ondansetron (ZOFRAN) injection 4 mg  Dose: 4 mg  Freq: Every 6 Hours PRN Route: IV  PRN Reasons: Nausea,Vomiting  Start: 06/05/24 0252     Admin Instructions:   If BOTH ondansetron (ZOFRAN) and promethazine (PHENERGAN) are ordered use ondansetron first and THEN promethazine IF ondansetron is ineffective.           sodium chloride 0.9 % flush 10 mL  Dose: 10 mL  Freq: As Needed Route: IV  PRN Reason: Line Care  Start: 06/05/24 0251           sodium chloride 0.9 % flush 10 mL  Dose: 10 mL  Freq: Every 12 Hours Scheduled Route: IV  Start: 06/05/24 0900       0853-Given     2055-Canceled Entry         0900     2100           sodium chloride 0.9 % flush 10 mL  Dose: 10 mL  Freq: As Needed Route: IV  PRN Reason: Line Care  Start: 06/04/24 2225           sodium chloride 0.9 % infusion  Rate: 100 mL/hr Dose: 100 mL/hr  Freq: Continuous Route: IV  Start: 06/05/24 0345       0321-New Bag     1714-New Bag            sodium chloride 0.9 % infusion 40 mL  Dose: 40 mL  Freq: As Needed Route: IV  PRN Reason: Line Care  Start: 06/05/24 0251     Admin Instructions:   Following administration of an IV intermittent medication, flush line with 40mL NS at 100mL/hr.            thiamine (B-1) injection 200 mg  Dose: 200 mg  Freq: Every 8 Hours Scheduled Route: IV  Start: 06/05/24 1400 End: 06/10/24 1359     Admin Instructions:   Doses of up to 250mg, give over 1-2 minutes (IV push). Doses 250mg and above, give over 30 minutes.       1420-Given     2027-Given     2200-Canceled Entry        0509-Given     1400     2200          Followed by   thiamine (VITAMIN B-1) tablet 100 mg  Dose: 100 mg  Freq: Daily Route: PO  Start: 06/10/24 1400           Completed Medications  Medications 06/04/24 06/05/24 06/06/24      acetaminophen (TYLENOL) tablet 1,000 mg  Dose: 1,000 mg  Freq:  "Once Route: PO  Start: 06/04/24 2242 End: 06/04/24 2241     Admin Instructions:   If given for fever, use fever parameter: fever greater than 100.4 °F  Based on patient request - if ordered for moderate or severe pain, provider allows for administration of a medication prescribed for a lower pain scale.    Do not exceed 4 grams of acetaminophen in a 24 hr period. Max dose of 2gm for AST/ALT greater than 120 units/L.    If given for pain, use the following pain scale:   Mild Pain = Pain Score of 1-3, CPOT 1-2  Moderate Pain = Pain Score of 4-6, CPOT 3-4  Severe Pain = Pain Score of 7-10, CPOT 5-8      2241-Given              butalbital-acetaminophen-caffeine (FIORICET, ESGIC) -40 MG per tablet 2 tablet  Dose: 2 tablet  Freq: Once Route: PO  Start: 06/05/24 0400 End: 06/05/24 0320     Admin Instructions:   Do not exceed 4g of acetaminophen in a 24 hour period.  Maximum 6 tablets per 24 hours. Max dose of 2gm for AST/ALT greater than 120 units/L       0320-Given             diphenhydrAMINE (BENADRYL) capsule 25 mg  Dose: 25 mg  Freq: Once Route: PO  Start: 06/05/24 2200 End: 06/05/24 2117     Admin Instructions:   Caution: Look alike/sound alike drug alert. This med may be ordered in other forms and routes. Before giving verify the last time the drug was given by any route/form.       2117-Given              diphenhydrAMINE (BENADRYL) injection 25 mg  Dose: 25 mg  Freq: Once Route: IV  Start: 06/05/24 1015 End: 06/05/24 1100     Admin Instructions:   25 mg may be given IV push over less than 1 minute.  Caution: Look alike/sound alike drug alert. This med may be ordered in other forms and routes. Before giving verify the last time the drug was given by any route/form.       1100-Given             And   prochlorperazine (COMPAZINE) injection 10 mg  Dose: 10 mg  Freq: Once Route: IV  Start: 06/05/24 1015 End: 06/05/24 1059     Admin Instructions:   \"If multiple N/V medications ordered, use in the following order: " "Ondansetron, Prochlorperazine, Promethazine. Use PO unless patient refuses or patient unable to swallow.\"       1059-Given             And   metoclopramide (REGLAN) injection 10 mg  Dose: 10 mg  Freq: Once Route: IV  Start: 06/05/24 1015 End: 06/05/24 1100     Admin Instructions:   Doses of 10 mg or less can be given IV push undiluted over 1 to 2 minutes       1100-Given             iopamidol (ISOVUE-300) 61 % injection 100 mL  Dose: 100 mL  Freq: Once in Imaging Route: IV  Start: 06/04/24 2337 End: 06/04/24 2321      2321-Given              lactated ringers bolus 1,000 mL  Dose: 1,000 mL  Freq: Once Route: IV  Start: 06/04/24 2242 End: 06/05/24 0159      2330-New Bag          0159-Stopped             methylPREDNISolone sodium succinate (SOLU-Medrol) injection 125 mg  Dose: 125 mg  Freq: Once Route: IV  Start: 06/05/24 1800 End: 06/05/24 1714     Admin Instructions:   Caution: Look alike/sound alike drug alert       1714-Given             morphine injection 4 mg  Dose: 4 mg  Freq: Once Route: IV  Start: 06/05/24 0011 End: 06/05/24 0013     Admin Instructions:   Based on patient request - if ordered for moderate or severe pain, provider allows for administration of a medication prescribed for a lower pain scale.  If given for pain, use the following pain scale:  Mild Pain = Pain Score of 1-3, CPOT 1-2  Moderate Pain = Pain Score of 4-6, CPOT 3-4  Severe Pain = Pain Score of 7-10, CPOT 5-8       0013-Given             ondansetron (ZOFRAN) injection 4 mg  Dose: 4 mg  Freq: Once Route: IV  Start: 06/05/24 0011 End: 06/05/24 0013     Admin Instructions:   \"If multiple N/V medications ordered, use in the following order: Ondansetron, Prochlorperazine, Promethazine. Use PO unless patient refuses or patient unable to swallow.\"       0013-Given             ondansetron (ZOFRAN) injection 4 mg  Dose: 4 mg  Freq: Once Route: IV  Start: 06/04/24 2242 End: 06/04/24 2242     Admin Instructions:   \"If multiple N/V medications " "ordered, use in the following order: Ondansetron, Prochlorperazine, Promethazine. Use PO unless patient refuses or patient unable to swallow.\"      2242-Given              piperacillin-tazobactam (ZOSYN) 3.375 g IVPB in 100 mL NS MBP (CD)  Dose: 3.375 g  Freq: Once Route: IV  Indications of Use: SEPSIS  Indications Comment: Unknown Source  Start: 06/05/24 0026 End: 06/05/24 0152       0052-New Bag     0152-Stopped            vancomycin 2250 mg/500 mL 0.9% NS IVPB ([facility])  Dose: 20 mg/kg  Weight Dosing Info: 118 kg  Freq: Once Route: IV  Indications of Use: SEPSIS  Start: 06/05/24 0031 End: 06/05/24 0415       0200-New Bag             Discontinued Medications  Medications 06/04/24 06/05/24 06/06/24      Pharmacy to dose vancomycin  Freq: Continuous PRN Route: XX  PRN Reason: Consult  Indications of Use: SEPSIS  Start: 06/05/24 0253 End: 06/06/24 0720           Pharmacy to dose vancomycin  Freq: Continuous PRN Route: XX  PRN Reason: Consult  Indications of Use: SEPSIS  Start: 06/05/24 0010 End: 06/05/24 0304           Pharmacy to Dose Zosyn  Freq: Continuous PRN Route: XX  PRN Reason: Consult  Indications of Use: SEPSIS  Start: 06/05/24 0253 End: 06/05/24 1502           piperacillin-tazobactam (ZOSYN) 3.375 g IVPB in 100 mL NS MBP (CD)  Dose: 3.375 g  Freq: Every 8 Hours Route: IV  Indications of Use: SEPSIS  Start: 06/05/24 0800 End: 06/05/24 0259           piperacillin-tazobactam (ZOSYN) 4.5 g IVPB in 100 mL NS MBP (CD)  Dose: 4.5 g  Freq: Every 8 Hours Route: IV  Indications of Use: SEPSIS  Start: 06/05/24 0800 End: 06/06/24 0720       0853-New Bag     1713-New Bag         0009-New Bag            Vancomycin HCl 1,250 mg in sodium chloride 0.9 % 250 mL VTB  Dose: 1,250 mg  Freq: Every 12 Hours Route: IV  Indications of Use: SEPSIS  Start: 06/05/24 1300 End: 06/06/24 0720       1419-New Bag          0037-New Bag            Vancomycin HCl 1,250 mg in sodium chloride 0.9 % 250 mL VTB  Dose: 1,250 mg  Freq: Every 12 Hours " Route: IV  Indications of Use: SEPSIS  Start: 24 1300 End: 24 0303                       Consult Notes (all)        Delmis Wu MD at 24 0622        Consult Orders    1. Inpatient Infectious Diseases Consult [[id number]] ordered by Nik Muhammad MD at 24 1700                 CONSULT NOTE    Infectious Diseases - Delmis Wu. MD  [facility]       Patient Identification:  Name: Magdalena Carreon  Age: 39 y.o.  Sex: female  :  1985  MRN: [id number]             Date of Consultation: 2024      Primary Care Physician: Sonja Broussard MD                               Requesting Physician: Dr. Muhammad  Reason for Consultation: Fever    History of presenting illness: Patient is a 39-year-old female with past medical history remarkable for morbid obesity, history of migraine and seizure disorder and history of PUPPP eruption of pregnancy was in her usual state of her health until about a month ago when she developed itchy rash under her abdominal fold in the suprapubic area in the groin area.  Patient was trying various over-the-counter measures such as topical steroids and antifungal treatment to those areas without any significant improvement and continued to have itching in that area.  Lately this area has spread involving the upper part of her abdomen and going into her flank area.  Patient did not have any fever chills or systemic signs of ill health.  Patient had recent video visit with her primary care provider to get her FMLA papers signed due to her migraine and seizures and while she was interacting with her provider she mention this rash to her.  Patient was started on Bactrim which she took 1 dose and subsequently went out to eat and afterwards developed fever nausea and vomiting and worsening of rash.  This resulted in her coming to the emergency room in the evening of 2024.  Her Bactrim was discontinued blood cultures were drawn and patient was started  on Zosyn and vancomycin.  She was noted to be febrile with normal white blood cell count.  Patient is currently feeling better and temperature pattern is better but her rash has gotten worse.  Infectious disease service is consulted.  Patient denies any pain and discomfort at these rashes that have scattered and spread in her abdomen and surrounding area.  Evaluation revealed erythematous rash involving the torso and upper shoulder which patient was unaware of.  Impression:  This presentation and the above context is consistent with:  1-febrile illness due to Bactrim with worsening rash versus systemic illness due to evolving bacteremia from skin sources  2-progressive itchy rash with pustular areas involving the abdominal wall initially starting in the skin fold and progressing without systemic signs and symptoms of inflammation and infection until after receiving Bactrim is concerning for localized intertriginous rash with secondary infection due to scratching and possible evolving cellulitis  3-morbid obesity  4-history of migraine and seizure disorder  5-history of prednisone allergy      Recommendations/Discussions:  At this juncture I agree with the care plan consisting of discontinuing Bactrim, general workup for febrile illness such as respiratory viral panel blood cultures and MRSA screen.  Since her MRSA screen is negative and she is feeling better even the rash is still present would recommend de-escalating antibiotic therapy and discontinue vancomycin.  Would recommend simplify Zosyn to cephalosporin class such as ceftriaxone or cefazolin while providing symptomatic management of her rash and its progression of Bactrim.  Monitor fever pattern and follow-up on blood culture results.  If she remains hemodynamically stable and fever free then it would not be unreasonable to discharge her on short course of oral Keflex to address secondary cellulitis of the abdominal wall with plans for out of hospital  dermatology evaluation.  Short course of fluconazole is not unreasonable in this situation.  Thank you Dr. Muhammad for letting me be the part of your patient care please see above impression and recommendation            Past Medical History:  Past Medical History:   Diagnosis Date    Abnormal Pap smear of cervix     Migraine     Seizure disorder      Past Surgical History:  Past Surgical History:   Procedure Laterality Date    CEREBRAL ANEURYSM REPAIR        Home Meds:  Medications Prior to Admission   Medication Sig Dispense Refill Last Dose    ibuprofen (ADVIL,MOTRIN) 200 MG tablet Take 2 tablets by mouth Every 6 (Six) Hours As Needed for Mild Pain.       levETIRAcetam (KEPPRA) 1000 MG tablet Take 1 tablet by mouth 2 (Two) Times a Day.  0 6/4/2024 at 1000    ondansetron ODT (ZOFRAN-ODT) 4 MG disintegrating tablet Place 2 tablets on the tongue Every 8 (Eight) Hours As Needed for Nausea or Vomiting. 12 tablet 0 Past Week    promethazine (PHENERGAN) 25 MG tablet Take 1 tablet by mouth Every 6 (Six) Hours As Needed for Nausea or Vomiting. May cause drowsiness. 12 tablet 0 Past Week    sulfamethoxazole-trimethoprim (BACTRIM DS,SEPTRA DS) 800-160 MG per tablet Take 1 tablet by mouth 2 (Two) Times a Day.   6/4/2024    baclofen (LIORESAL) 10 MG tablet Has new Rx that she has not started as of June 2024        Current Meds:     Current Facility-Administered Medications:     acetaminophen (TYLENOL) tablet 650 mg, 650 mg, Oral, Q4H PRN, 650 mg at 06/05/24 1420 **OR** acetaminophen (TYLENOL) 160 MG/5ML oral solution 650 mg, 650 mg, Oral, Q4H PRN **OR** acetaminophen (TYLENOL) suppository 650 mg, 650 mg, Rectal, Q4H PRN, Neena Melton APRN    sennosides-docusate (PERICOLACE) 8.6-50 MG per tablet 2 tablet, 2 tablet, Oral, BID PRN **AND** polyethylene glycol (MIRALAX) packet 17 g, 17 g, Oral, Daily PRN **AND** bisacodyl (DULCOLAX) EC tablet 5 mg, 5 mg, Oral, Daily PRN **AND** bisacodyl (DULCOLAX) suppository 10 mg, 10 mg,  Rectal, Daily PRN, Neena Melton APRN    butalbital-acetaminophen-caffeine (FIORICET, ESGIC) -40 MG per tablet 1 tablet, 1 tablet, Oral, Q4H PRN, Nik Muhammad MD, 1 tablet at 06/06/24 0210    calcium carbonate (TUMS) chewable tablet 500 mg (200 mg elemental), 2 tablet, Oral, BID PRN, Neena Melton APRN    folic acid (FOLVITE) tablet 1 mg, 1 mg, Oral, Daily, Nik Muhammad MD, 1 mg at 06/05/24 1249    HYDROcodone-acetaminophen (NORCO) 5-325 MG per tablet 1 tablet, 1 tablet, Oral, Q6H PRN, Nik Muhammad MD    HYDROmorphone (DILAUDID) injection 0.5 mg, 0.5 mg, Intravenous, Q2H PRN, Nik Muhammad MD    lactated ringers bolus 1,000 mL, 1,000 mL, Intravenous, Once, Oliva Reynaga PA-C, Stopped at 06/05/24 0735    levETIRAcetam (KEPPRA) tablet 1,000 mg, 1,000 mg, Oral, BID, Nik Muhammad MD, 1,000 mg at 06/05/24 2027    LORazepam (ATIVAN) tablet 1 mg, 1 mg, Oral, Q1H PRN **OR** midazolam (VERSED) injection 2 mg, 2 mg, Intravenous, Q1H PRN **OR** LORazepam (ATIVAN) tablet 2 mg, 2 mg, Oral, Q1H PRN **OR** midazolam (VERSED) injection 4 mg, 4 mg, Intravenous, Q1H PRN **OR** midazolam (VERSED) injection 4 mg, 4 mg, Intravenous, Q15 Min PRN **OR** midazolam (VERSED) injection 4 mg, 4 mg, Intramuscular, Q15 Min PRN, Nik Muhammad MD    Magnesium Standard Dose Replacement - Follow Nurse / BPA Driven Protocol, , Does not apply, PRN, Nik Muhammad MD    nicotine (NICODERM CQ) 21 MG/24HR patch 1 patch, 1 patch, Transdermal, Q24H, Neena Melton APRN, 1 patch at 06/05/24 0853    nitroglycerin (NITROSTAT) SL tablet 0.4 mg, 0.4 mg, Sublingual, Q5 Min PRN, Neena Melton APRN    ondansetron ODT (ZOFRAN-ODT) disintegrating tablet 4 mg, 4 mg, Oral, Q6H PRN **OR** ondansetron (ZOFRAN) injection 4 mg, 4 mg, Intravenous, Q6H PRN, Neena Melton APRN    Pharmacy to dose vancomycin, , Does not apply, Continuous PRN, Neena Melton APRN    piperacillin-tazobactam (ZOSYN) 4.5 g IVPB  "in 100 mL NS MBP (CD), 4.5 g, Intravenous, Q8H, Neena Melton APRN, 4.5 g at 06/06/24 0009    sodium chloride 0.9 % flush 10 mL, 10 mL, Intravenous, PRN, Emergency, Triage Protocol, MD    sodium chloride 0.9 % flush 10 mL, 10 mL, Intravenous, Q12H, Neena Melton APRN, 10 mL at 06/05/24 0853    sodium chloride 0.9 % flush 10 mL, 10 mL, Intravenous, PRN, Neena Melton APRN    sodium chloride 0.9 % infusion 40 mL, 40 mL, Intravenous, PRN, Neena Melton APRN    sodium chloride 0.9 % infusion, 100 mL/hr, Intravenous, Continuous, Neena Melton APRN, Last Rate: 100 mL/hr at 06/05/24 1714, 100 mL/hr at 06/05/24 1714    thiamine (B-1) injection 200 mg, 200 mg, Intravenous, Q8H, 200 mg at 06/06/24 0509 **FOLLOWED BY** [START ON 6/10/2024] thiamine (VITAMIN B-1) tablet 100 mg, 100 mg, Oral, Daily, Nik Muhammad MD    Vancomycin HCl 1,250 mg in sodium chloride 0.9 % 250 mL VTB, 1,250 mg, Intravenous, Q12H, Neena Melton APRN, Last Rate: 200 mL/hr at 06/06/24 0037, 1,250 mg at 06/06/24 0037  Allergies:  Allergies   Allergen Reactions    Prednisone Hives     Social History:   Social History     Tobacco Use    Smoking status: Every Day     Current packs/day: 1.00     Average packs/day: 1 pack/day for 25.0 years (25.0 ttl pk-yrs)     Types: Cigarettes     Start date: 6/5/1999    Smokeless tobacco: Not on file   Substance Use Topics    Alcohol use: Not on file     Comment: before pregnancy      Family History:  Family History   Problem Relation Age of Onset    Melanoma Father     Diabetes Father     Pancreatic cancer Paternal Grandmother     Diabetes Paternal Grandmother     Diabetes Maternal Grandmother           Review of Systems  See history of present illness and past medical history  Database reviewed    Vitals:   /77   Pulse 86   Temp 98.1 °F (36.7 °C)   Resp 18   Ht 157.2 cm (61.89\")   Wt 118 kg (260 lb)   LMP 04/29/2024 (Approximate)   SpO2 94%   Breastfeeding No "   BMI 47.72 kg/m²   I/O:   Intake/Output Summary (Last 24 hours) at 6/6/2024 0622  Last data filed at 6/5/2024 1817  Gross per 24 hour   Intake 778 ml   Output --   Net 778 ml     Exam:  Patient is examined using the personal protective equipment as per guidelines from infection control for this particular patient as enacted.  Hand washing was performed before and after patient interaction.  General Appearance:    Alert, cooperative, no distress, appears stated age   Head:    Normocephalic, without obvious abnormality, atraumatic   Eyes:    PERRL, conjunctivae/corneas clear, EOM's intact, both eyes   Ears:    Normal external ear canals, both ears   Nose:   Nares normal, septum midline, mucosa normal, no drainage    or sinus tenderness   Throat:   Lips, tongue, gums normal; oral mucosa pink and moist   Neck:   Supple, symmetrical, trachea midline, no adenopathy;     thyroid:  no enlargement/tenderness/nodules; no carotid    bruit or JVD   Back:     Symmetric, no curvature, ROM normal, no CVA tenderness   Lungs:     Clear to auscultation bilaterally, respirations unlabored   Chest Wall:    No tenderness or deformity    Heart:    Regular rate and rhythm, S1 and S2 normal, no murmur, rub   or gallop   Abdomen:   Obese soft and nontender   Extremities:   Extremities normal, atraumatic, no cyanosis or edema   Pulses:   Pulses palpable in all extremities; symmetric all extremities   Skin: Scattered area of maculopapular rash with excoriation and folliculitis type lesion involving the anterior abdominal wall with extension along the shoulder and upper torso.  No obvious cellulitis noted.   Neurologic:   CNII-XII intact, motor strength grossly intact, sensation grossly intact to light touch, no focal deficits noted       Data Review:    I reviewed the patient's new clinical results.  Results from last 7 days   Lab Units 06/05/24  0651 06/04/24  2235   WBC 10*3/mm3 11.71* 5.80   HEMOGLOBIN g/dL 12.7 10.6*   PLATELETS  10*3/mm3 283 243     Results from last 7 days   Lab Units 06/05/24  0651 06/04/24  2235   SODIUM mmol/L 139 141   POTASSIUM mmol/L 4.1 3.9   CHLORIDE mmol/L 107 107   CO2 mmol/L 20.8* 20.4*   BUN mg/dL 8 8   CREATININE mg/dL 0.77 0.71   CALCIUM mg/dL 8.3* 9.2   GLUCOSE mg/dL 131* 107*     Microbiology Results (last 10 days)       Procedure Component Value - Date/Time    Respiratory Panel PCR w/COVID-19(SARS-CoV-2) ADALBERTO/BERNADETTE/KAYE/PAD/COR/DAVEY In-House, NP Swab in UTM/VTM, 2 HR TAT - Swab, Nasopharynx [[id number]]  (Normal) Collected: 06/05/24 1758    Lab Status: Final result Specimen: Swab from Nasopharynx Updated: 06/05/24 2048     ADENOVIRUS, PCR Not Detected     Coronavirus 229E Not Detected     Coronavirus HKU1 Not Detected     Coronavirus NL63 Not Detected     Coronavirus OC43 Not Detected     COVID19 Not Detected     Human Metapneumovirus Not Detected     Human Rhinovirus/Enterovirus Not Detected     Influenza A PCR Not Detected     Influenza B PCR Not Detected     Parainfluenza Virus 1 Not Detected     Parainfluenza Virus 2 Not Detected     Parainfluenza Virus 3 Not Detected     Parainfluenza Virus 4 Not Detected     RSV, PCR Not Detected     Bordetella pertussis pcr Not Detected     Bordetella parapertussis PCR Not Detected     Chlamydophila pneumoniae PCR Not Detected     Mycoplasma pneumo by PCR Not Detected    Narrative:      In the setting of a positive respiratory panel with a viral infection PLUS a negative procalcitonin without other underlying concern for bacterial infection, consider observing off antibiotics or discontinuation of antibiotics and continue supportive care. If the respiratory panel is positive for atypical bacterial infection (Bordetella pertussis, Chlamydophila pneumoniae, or Mycoplasma pneumoniae), consider antibiotic de-escalation to target atypical bacterial infection.    MRSA Screen, PCR (Inpatient) - Swab, Nares [[id number]]  (Normal) Collected: 06/05/24 0310    Lab Status: Final  result Specimen: Swab from Nares Updated: 06/05/24 0730     MRSA PCR No MRSA Detected    Narrative:      The negative predictive value of this diagnostic test is high and should only be used to consider de-escalating anti-MRSA therapy. A positive result may indicate colonization with MRSA and must be correlated clinically.    COVID-19, FLU A/B, RSV PCR 1 HR TAT - Swab, Nasopharynx [[id number]]  (Normal) Collected: 06/04/24 2328    Lab Status: Final result Specimen: Swab from Nasopharynx Updated: 06/05/24 0029     COVID19 Not Detected     Influenza A PCR Not Detected     Influenza B PCR Not Detected     RSV, PCR Not Detected    Narrative:      Fact sheet for providers: https://www.fda.gov/media/698415/download    Fact sheet for patients: https://www.fda.gov/media/001830/download    Test performed by PCR.    Blood Culture - Blood, Arm, Right [[id number]]  (Normal) Collected: 06/04/24 2237    Lab Status: Preliminary result Specimen: Blood from Arm, Right Updated: 06/05/24 2246     Blood Culture No growth at 24 hours    Blood Culture - Blood, Arm, Left [[id number]]  (Normal) Collected: 06/04/24 2237    Lab Status: Preliminary result Specimen: Blood from Arm, Left Updated: 06/05/24 2246     Blood Culture No growth at 24 hours              Assessment:  Active Hospital Problems    Diagnosis  POA    **Sepsis [A41.9]  Yes    Acute lactic acidosis [E87.21]  Unknown    Cellulitis [L03.90]  Unknown    Migraine [G43.909]  Unknown    Seizure disorder [G40.909]  Unknown    Allergy to sulfa drugs [Z88.2]  Not Applicable    Drug induced fever [R50.2]  Unknown    Dermatitis, unspecified, on abdominal wall [L30.9]  Unknown      Resolved Hospital Problems   No resolved problems to display.         Plan:  See above  Delmis Wu MD   6/6/2024  06:22 EDT    Parts of this note may be an electronic transcription/translation of spoken language to printed text using the Dragon dictation system.      Electronically signed by Delmis Wu MD at  06/06/24 0719

## 2024-06-06 NOTE — PLAN OF CARE
Goal Outcome Evaluation:  Problem: Adult Inpatient Plan of Care  Goal: Optimal Comfort and Wellbeing  Outcome: Ongoing, Progressing  Intervention: Provide Person-Centered Care  Recent Flowsheet Documentation  Taken 6/6/2024 1402 by Beth Machado RN  Trust Relationship/Rapport:   care explained   choices provided   thoughts/feelings acknowledged  Taken 6/6/2024 0909 by Beth Machado RN  Trust Relationship/Rapport:   care explained   choices provided   thoughts/feelings acknowledged     Problem: Fall Injury Risk  Goal: Absence of Fall and Fall-Related Injury  Outcome: Ongoing, Progressing  Intervention: Identify and Manage Contributors  Recent Flowsheet Documentation  Taken 6/6/2024 1800 by Beth Machado RN  Medication Review/Management: medications reviewed  Taken 6/6/2024 1632 by Beth Machado RN  Medication Review/Management: medications reviewed  Taken 6/6/2024 1402 by Beth Machado RN  Medication Review/Management: medications reviewed  Taken 6/6/2024 1000 by Beth Machado RN  Medication Review/Management: medications reviewed  Taken 6/6/2024 0909 by Beth Machado RN  Medication Review/Management: medications reviewed  Intervention: Promote Injury-Free Environment  Recent Flowsheet Documentation  Taken 6/6/2024 1800 by Beth Machado RN  Safety Promotion/Fall Prevention:   activity supervised   assistive device/personal items within reach   clutter free environment maintained   fall prevention program maintained   nonskid shoes/slippers when out of bed   room organization consistent   safety round/check completed  Taken 6/6/2024 1632 by Beth Machado RN  Safety Promotion/Fall Prevention:   clutter free environment maintained   fall prevention program maintained   nonskid shoes/slippers when out of bed   room organization consistent   safety round/check completed  Taken 6/6/2024 1402 by Beth Machado RN  Safety Promotion/Fall  Prevention:   activity supervised   assistive device/personal items within reach   clutter free environment maintained   fall prevention program maintained   nonskid shoes/slippers when out of bed   room organization consistent   safety round/check completed  Taken 6/6/2024 1000 by Beth Machado, RN  Safety Promotion/Fall Prevention:   activity supervised   assistive device/personal items within reach   clutter free environment maintained   fall prevention program maintained   nonskid shoes/slippers when out of bed   room organization consistent   safety round/check completed  Taken 6/6/2024 0909 by Beth Machado, RN  Safety Promotion/Fall Prevention:   clutter free environment maintained   fall prevention program maintained   activity supervised   assistive device/personal items within reach   nonskid shoes/slippers when out of bed   room organization consistent   safety round/check completed

## 2024-06-07 ENCOUNTER — READMISSION MANAGEMENT (OUTPATIENT)
Dept: CALL CENTER | Facility: HOSPITAL | Age: 39
End: 2024-06-07
Payer: COMMERCIAL

## 2024-06-07 VITALS
DIASTOLIC BLOOD PRESSURE: 75 MMHG | SYSTOLIC BLOOD PRESSURE: 116 MMHG | HEART RATE: 65 BPM | RESPIRATION RATE: 18 BRPM | TEMPERATURE: 97.5 F | WEIGHT: 260 LBS | OXYGEN SATURATION: 98 % | BODY MASS INDEX: 47.84 KG/M2 | HEIGHT: 62 IN

## 2024-06-07 DIAGNOSIS — G47.33 OBSTRUCTIVE SLEEP APNEA: Primary | ICD-10-CM

## 2024-06-07 PROBLEM — E87.21 ACUTE LACTIC ACIDOSIS: Status: RESOLVED | Noted: 2024-06-05 | Resolved: 2024-06-07

## 2024-06-07 PROBLEM — R50.2 DRUG INDUCED FEVER: Status: RESOLVED | Noted: 2024-06-05 | Resolved: 2024-06-07

## 2024-06-07 PROBLEM — A41.9 SEPSIS: Status: RESOLVED | Noted: 2024-06-05 | Resolved: 2024-06-07

## 2024-06-07 LAB
ANION GAP SERPL CALCULATED.3IONS-SCNC: 6.9 MMOL/L (ref 5–15)
BASOPHILS # BLD AUTO: 0.02 10*3/MM3 (ref 0–0.2)
BASOPHILS NFR BLD AUTO: 0.2 % (ref 0–1.5)
BUN SERPL-MCNC: 8 MG/DL (ref 6–20)
BUN/CREAT SERPL: 13.3 (ref 7–25)
CALCIUM SPEC-SCNC: 9.1 MG/DL (ref 8.6–10.5)
CHLORIDE SERPL-SCNC: 111 MMOL/L (ref 98–107)
CO2 SERPL-SCNC: 21.1 MMOL/L (ref 22–29)
CREAT SERPL-MCNC: 0.6 MG/DL (ref 0.57–1)
DEPRECATED RDW RBC AUTO: 41.4 FL (ref 37–54)
EGFRCR SERPLBLD CKD-EPI 2021: 115.8 ML/MIN/1.73
EOSINOPHIL # BLD AUTO: 0.01 10*3/MM3 (ref 0–0.4)
EOSINOPHIL NFR BLD AUTO: 0.1 % (ref 0.3–6.2)
ERYTHROCYTE [DISTWIDTH] IN BLOOD BY AUTOMATED COUNT: 11.7 % (ref 12.3–15.4)
GLUCOSE SERPL-MCNC: 142 MG/DL (ref 65–99)
HCT VFR BLD AUTO: 37.6 % (ref 34–46.6)
HGB BLD-MCNC: 12.6 G/DL (ref 12–15.9)
IMM GRANULOCYTES # BLD AUTO: 0.1 10*3/MM3 (ref 0–0.05)
IMM GRANULOCYTES NFR BLD AUTO: 0.8 % (ref 0–0.5)
LYMPHOCYTES # BLD AUTO: 1.39 10*3/MM3 (ref 0.7–3.1)
LYMPHOCYTES NFR BLD AUTO: 10.5 % (ref 19.6–45.3)
MCH RBC QN AUTO: 32.6 PG (ref 26.6–33)
MCHC RBC AUTO-ENTMCNC: 33.5 G/DL (ref 31.5–35.7)
MCV RBC AUTO: 97.2 FL (ref 79–97)
MONOCYTES # BLD AUTO: 0.85 10*3/MM3 (ref 0.1–0.9)
MONOCYTES NFR BLD AUTO: 6.4 % (ref 5–12)
NEUTROPHILS NFR BLD AUTO: 10.9 10*3/MM3 (ref 1.7–7)
NEUTROPHILS NFR BLD AUTO: 82 % (ref 42.7–76)
NRBC BLD AUTO-RTO: 0 /100 WBC (ref 0–0.2)
PLATELET # BLD AUTO: 274 10*3/MM3 (ref 140–450)
PMV BLD AUTO: 9.8 FL (ref 6–12)
POTASSIUM SERPL-SCNC: 4.1 MMOL/L (ref 3.5–5.2)
RBC # BLD AUTO: 3.87 10*6/MM3 (ref 3.77–5.28)
SODIUM SERPL-SCNC: 139 MMOL/L (ref 136–145)
WBC NRBC COR # BLD AUTO: 13.27 10*3/MM3 (ref 3.4–10.8)

## 2024-06-07 PROCEDURE — 85025 COMPLETE CBC W/AUTO DIFF WBC: CPT | Performed by: HOSPITALIST

## 2024-06-07 PROCEDURE — 63710000001 DIPHENHYDRAMINE PER 50 MG: Performed by: PSYCHIATRY & NEUROLOGY

## 2024-06-07 PROCEDURE — 80048 BASIC METABOLIC PNL TOTAL CA: CPT | Performed by: HOSPITALIST

## 2024-06-07 PROCEDURE — 99214 OFFICE O/P EST MOD 30 MIN: CPT | Performed by: NURSE PRACTITIONER

## 2024-06-07 PROCEDURE — 25010000002 CEFAZOLIN PER 500 MG: Performed by: INTERNAL MEDICINE

## 2024-06-07 PROCEDURE — G0378 HOSPITAL OBSERVATION PER HR: HCPCS

## 2024-06-07 PROCEDURE — 25010000002 PROCHLORPERAZINE 10 MG/2ML SOLUTION: Performed by: PSYCHIATRY & NEUROLOGY

## 2024-06-07 PROCEDURE — 96376 TX/PRO/DX INJ SAME DRUG ADON: CPT

## 2024-06-07 PROCEDURE — 25010000002 THIAMINE HCL 200 MG/2ML SOLUTION: Performed by: HOSPITALIST

## 2024-06-07 PROCEDURE — 25810000003 SODIUM CHLORIDE 0.9 % SOLUTION: Performed by: NURSE PRACTITIONER

## 2024-06-07 RX ORDER — FLUCONAZOLE 200 MG/1
200 TABLET ORAL EVERY 24 HOURS
Qty: 5 TABLET | Refills: 0 | Status: SHIPPED | OUTPATIENT
Start: 2024-06-08 | End: 2024-06-13

## 2024-06-07 RX ORDER — FOLIC ACID 1 MG/1
1 TABLET ORAL DAILY
Qty: 30 TABLET | Refills: 1 | Status: SHIPPED | OUTPATIENT
Start: 2024-06-08

## 2024-06-07 RX ORDER — RIBOFLAVIN (VITAMIN B2) 100 MG
400 TABLET ORAL DAILY
Qty: 120 EACH | Refills: 1 | Status: SHIPPED | OUTPATIENT
Start: 2024-06-07

## 2024-06-07 RX ORDER — RIBOFLAVIN (VITAMIN B2) 100 MG
400 TABLET ORAL DAILY
Status: DISCONTINUED | OUTPATIENT
Start: 2024-06-07 | End: 2024-06-07 | Stop reason: HOSPADM

## 2024-06-07 RX ORDER — CEPHALEXIN 500 MG/1
500 CAPSULE ORAL 4 TIMES DAILY
Qty: 20 CAPSULE | Refills: 0 | Status: SHIPPED | OUTPATIENT
Start: 2024-06-07 | End: 2024-06-12

## 2024-06-07 RX ORDER — LANOLIN ALCOHOL/MO/W.PET/CERES
100 CREAM (GRAM) TOPICAL DAILY
Qty: 30 TABLET | Refills: 1 | Status: SHIPPED | OUTPATIENT
Start: 2024-06-10

## 2024-06-07 RX ADMIN — CEFAZOLIN 2000 MG: 2 INJECTION, POWDER, FOR SOLUTION INTRAVENOUS at 00:44

## 2024-06-07 RX ADMIN — FLUCONAZOLE 200 MG: 200 TABLET ORAL at 09:22

## 2024-06-07 RX ADMIN — LEVETIRACETAM 1000 MG: 500 TABLET, FILM COATED ORAL at 09:22

## 2024-06-07 RX ADMIN — CEFAZOLIN 2000 MG: 2 INJECTION, POWDER, FOR SOLUTION INTRAVENOUS at 09:22

## 2024-06-07 RX ADMIN — PROCHLORPERAZINE EDISYLATE 10 MG: 5 INJECTION INTRAMUSCULAR; INTRAVENOUS at 09:22

## 2024-06-07 RX ADMIN — SODIUM CHLORIDE 100 ML/HR: 9 INJECTION, SOLUTION INTRAVENOUS at 02:13

## 2024-06-07 RX ADMIN — NICOTINE 1 PATCH: 21 PATCH, EXTENDED RELEASE TRANSDERMAL at 09:24

## 2024-06-07 RX ADMIN — THIAMINE HYDROCHLORIDE 200 MG: 100 INJECTION, SOLUTION INTRAMUSCULAR; INTRAVENOUS at 06:26

## 2024-06-07 RX ADMIN — PROCHLORPERAZINE EDISYLATE 10 MG: 5 INJECTION INTRAMUSCULAR; INTRAVENOUS at 00:44

## 2024-06-07 RX ADMIN — FOLIC ACID 1 MG: 1 TABLET ORAL at 09:22

## 2024-06-07 RX ADMIN — DIPHENHYDRAMINE HYDROCHLORIDE 25 MG: 25 CAPSULE ORAL at 09:22

## 2024-06-07 RX ADMIN — NYSTATIN, TRIAMCINOLONE ACETONIDE 1 APPLICATION: 100000; 1 CREAM TOPICAL at 09:31

## 2024-06-07 RX ADMIN — MAGNESIUM OXIDE 400 MG (241.3 MG MAGNESIUM) TABLET 400 MG: TABLET at 12:17

## 2024-06-07 RX ADMIN — DIPHENHYDRAMINE HYDROCHLORIDE 25 MG: 25 CAPSULE ORAL at 00:44

## 2024-06-07 RX ADMIN — Medication 10 ML: at 09:31

## 2024-06-07 NOTE — PROGRESS NOTES
Enter Query Response Below      Query Response: Sepsis causing acute organ dysfunction, as evidenced by (acute lactic acidosis)              If applicable, please update the problem list.   Patient: Magdalena Carreon        : 1985  Account: [id number]           Admit Date:         How to Respond to this query:       a. Click New Note     b. Answer query within the yellow box.                c. Update the Problem List, if applicable.      If you have any questions about this query contact me at: [email or web address]     Dr. Beyer,    39yr female with sepsis and acute lactic acidosis. Lactate- 3.7 with IV fluids given.    After study, can the patients condition be further specified as:    Sepsis causing acute organ dysfunction, as evidenced by (acute lactic acidosis)  Sepsis without organ dysfunction  Other (please specify) ___________  Unable to clarify    By submitting this query, we are merely seeking further clarification of documentation to accurately reflect all conditions that you are monitoring, evaluating, treating or that extend the hospitalization or utilize additional resources of care. Please utilize your independent clinical judgment when addressing the question(s) above.     This query and your response, once completed, will be entered into the legal medical record.    Sincerely,  Anupama LAGOS Rn  Clinical Documentation Integrity Program

## 2024-06-07 NOTE — PROGRESS NOTES
"DOS: 2024  NAME: Magdalena Carreon   : 1985  PCP: Sonja Broussard MD  Chief Complaint   Patient presents with    Chills    Nausea    Vomiting     Neurology    Subjective: Patient reports headache much better after migraine cocktail.  Spouse at bedside.  Wants to go home. Pt seen in follow up today, however the problem is new to the examiner.     Objective:  Vital signs: /75 (BP Location: Right arm, Patient Position: Lying)   Pulse 65   Temp 97.5 °F (36.4 °C) (Oral)   Resp 18   Ht 157.2 cm (61.89\")   Wt 118 kg (260 lb)   LMP 2024 (Approximate)   SpO2 98%   Breastfeeding No   BMI 47.72 kg/m²       General appearance: Well developed, well nourished, well groomed, alert and cooperative.   HEENT: Normocephalic.   Cardiac: Regular rate and rhythm. No murmurs.   Chest Exam: Clear to auscultation bilaterally, no wheezes, no rhonchi.  Extremities: Normal, no edema.   Skin: No rashes or birthmarks.     Higher integrative function: Oriented to time, place, person, intact recent and remote memory, attention span, concentration and language. Spontaneous speech, fund of vocabulary are normal.   CN II: Normal visual fields.   CN III IV VI: Extraocular movements are full without nystagmus. Pupils are equal, round, and reactive to light.   CN V: Normal facial sensation.   CN VII: Facial movements are symmetric, no weakness.   CN VIII: Auditory acuity is normal.   CN IX & X: Symmetric palatal movement.   CN XI: Sternocleidomastoid and trapezius are normal. No weakness.   CN XII: The tongue is midline. No atrophy or fasciculations.   Motor: Normal muscle strength, bulk, and tone in upper and lower extremities. No fasciculations, rigidity, spasticity or abnormal movements.   Sensation: Normal light touch in all extremities.   Station and gait: Deferred.  Coordination: Finger to nose test showed no dysmetria. Heel to shin normal.     Scheduled Meds:ceFAZolin, 2,000 mg, Intravenous, Q8H  fluconazole, 200 " "mg, Oral, Q24H  folic acid, 1 mg, Oral, Daily  lactated ringers, 1,000 mL, Intravenous, Once  levETIRAcetam, 1,000 mg, Oral, BID  magnesium oxide, 400 mg, Oral, Daily  nicotine, 1 patch, Transdermal, Q24H  nystatin-triamcinolone, 1 Application, Topical, Q12H  sodium chloride, 10 mL, Intravenous, Q12H  thiamine (B-1) IV, 200 mg, Intravenous, Q8H   Followed by  [START ON 6/10/2024] thiamine, 100 mg, Oral, Daily  Vitamin B-2, 400 mg, Oral, Daily      Continuous Infusions:sodium chloride, 100 mL/hr, Last Rate: 100 mL/hr (06/07/24 0213)      PRN Meds:.  acetaminophen **OR** acetaminophen **OR** acetaminophen    senna-docusate sodium **AND** polyethylene glycol **AND** bisacodyl **AND** bisacodyl    butalbital-acetaminophen-caffeine    calcium carbonate    HYDROcodone-acetaminophen    HYDROmorphone    LORazepam **OR** midazolam **OR** LORazepam **OR** midazolam **OR** midazolam **OR** midazolam    Magnesium Standard Dose Replacement - Follow Nurse / BPA Driven Protocol    nitroglycerin    ondansetron ODT **OR** ondansetron    sodium chloride    sodium chloride    sodium chloride    Laboratory results:  Lab Results   Component Value Date    GLUCOSE 142 (H) 06/07/2024    CALCIUM 9.1 06/07/2024     06/07/2024    K 4.1 06/07/2024    CO2 21.1 (L) 06/07/2024     (H) 06/07/2024    BUN 8 06/07/2024    CREATININE 0.60 06/07/2024    EGFRIFAFRI >60 01/12/2022    BCR 13.3 06/07/2024    ANIONGAP 6.9 06/07/2024     Lab Results   Component Value Date    WBC 13.27 (H) 06/07/2024    HGB 12.6 06/07/2024    HCT 37.6 06/07/2024    MCV 97.2 (H) 06/07/2024     06/07/2024     No results found for: \"CHOL\"  No results found for: \"HDL\"  No results found for: \"LDL\"  No results found for: \"TRIG\"       Lab review:CMP with elevated glucose, CBC with mild anemia, no leukocytosis, initial lactic acid level 3.7, resolved 1.1, respiratory panel negative, blood cultures negative.  Review and interpretation of imaging:  CT Abdomen Pelvis " With Contrast    Result Date: 6/5/2024  Patient: ASHOK HUIZAR  Time Out: 00:01 Exam(s): CT ABDOMEN + PELVIS With Contrast EXAM: CT Abdomen and Pelvis With Intravenous Contrast CLINICAL HISTORY: Reason for exam: generalized abdominal pain, nausea, vomiting, fever, tachycardia. TECHNIQUE: Axial computed tomography images of the abdomen and pelvis with intravenous contrast.  CTDI is 30 mGy and DLP is 1409 mGy-cm.  This CT exam was performed according to the principle of ALARA (As Low As Reasonably Achievable) by using one or more of the following dose reduction techniques: automated exposure control, adjustment of the mA and or kV according to patient size, and or use of iterative reconstruction technique. COMPARISON: No relevant prior studies available. FINDINGS: Lung bases:  Unremarkable.  No mass.  No consolidation. ABDOMEN: Liver:  Hepatomegaly. Gallbladder and bile ducts:  Cholecystectomy.  No ductal dilation. Pancreas:  Unremarkable.  No mass.  No ductal dilation. Spleen:  Unremarkable.  No splenomegaly. Adrenals:  Unremarkable.  No mass. Kidneys and ureters:  Unremarkable.  No solid mass.  No hydronephrosis. Stomach and bowel:  Unremarkable.  No mucosal thickening.  No bowel obstruction. PELVIS: Appendix:  Normal appendix. Bladder:  Unremarkable.  No mass. Reproductive:  Previous tubal ligation.  Right tubal ligation clip is appropriately positioned.  Left tubal ligation clip has migrated into the anterior pelvic cavity.  Unremarkable uterus. ABDOMEN and PELVIS: Intraperitoneal space:  Unremarkable.  No free air, significant free fluid, or fluid collection. Bones joints:  No acute fracture.  No dislocation. Soft tissues: Fat-containing periumbilical hernia. Vasculature:  Unremarkable.  No abdominal aortic aneurysm. Lymph nodes:  Unremarkable.  No enlarged lymph nodes. IMPRESSION:     No acute findings in the abdomen or pelvis.     Electronically signed by Indra Segovia M.D. on 06-05-24 at 0001    XR Chest 1  View    Result Date: 6/4/2024  Patient: ASHOK HUIZAR  Time Out: 23:52 Exam(s): XR CXR 1 VIEW EXAM: XR Chest, 1 View CLINICAL HISTORY: Reason for exam: cough, fever, chills, n v. TECHNIQUE: Frontal view of the chest. COMPARISON: No relevant prior studies available. FINDINGS: Lungs:  Unremarkable.  No consolidation. Pleural space:  Unremarkable.  No pleural effusion or pneumothorax. Heart:  Unremarkable.  No cardiomegaly or pulmonary vascular congestion. Bones joints:  No acute fracture.  No dislocation. IMPRESSION:     No evidence of acute cardiopulmonary disease.     Electronically signed by Indra Segovia M.D. 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  Neurology was consulted for persistent migraine.  Patient reports chronic migraines which have been worse recently with persistent occipital headaches and associated light sensitivity.  Headache is worse with lying down.  No associated unilateral weakness/numbness but she did endorse an odd yawning sensation at times.  She was remotely followed by neurology, Dr. Bryson, but has not established care with anyone since his FDC.    Diagnosis:  Chronic migraine without aura with new occipital component    Plan:  CTvenogram initially ordered but headache has resolved with Compazine and Benadryl.  Discussed with Dr. Villela, given resolution of headache CTV no longer felt necessary, will DC.  If headache returns outpatient patient can call our office and we can reorder CT venogram.  We are arranging outpatient neurology follow-up as well as referral for sleep study which can contribute to chronic migraine.    Starting magnesium oxide and riboflavin.    Discussed with Dr. Villela and ANTOINE Butler for discharge from neurology standpoint.   Will sign off but please call further questions or concerns.

## 2024-06-07 NOTE — PLAN OF CARE
Goal Outcome Evaluation:  Plan of Care Reviewed With: patient        Progress: improving  Outcome Evaluation: VSS.  Headache better since start of migraine cocktail.  Continue IV fluids.  Awaiting Ct scan.  Pt up ad mendez.  Possible discharge home today.

## 2024-06-07 NOTE — PROGRESS NOTES
"  Infectious Diseases Progress Note    Delmis Wu MD     [facility]  Los: 2 days  Patient Identification:  Name: Magdalena Carreon  Age: 39 y.o.  Sex: female  :  1985  MRN: [id number]         Primary Care Physician: Sonja Broussard MD        Subjective:doing better and denies any fever and chills. Rash and itching is better  Interval History: see consultation note     Objective:    Scheduled Meds:ceFAZolin, 2,000 mg, Intravenous, Q8H  diphenhydrAMINE, 25 mg, Oral, Q8H  fluconazole, 200 mg, Oral, Q24H  folic acid, 1 mg, Oral, Daily  lactated ringers, 1,000 mL, Intravenous, Once  levETIRAcetam, 1,000 mg, Oral, BID  nicotine, 1 patch, Transdermal, Q24H  nystatin-triamcinolone, 1 Application, Topical, Q12H  prochlorperazine, 10 mg, Intravenous, Q8H  sodium chloride, 10 mL, Intravenous, Q12H  thiamine (B-1) IV, 200 mg, Intravenous, Q8H   Followed by  [START ON 6/10/2024] thiamine, 100 mg, Oral, Daily      Continuous Infusions:sodium chloride, 100 mL/hr, Last Rate: 100 mL/hr (24 0213)        Vital signs in last 24 hours:  Temp:  [97.5 °F (36.4 °C)-98.1 °F (36.7 °C)] 97.5 °F (36.4 °C)  Heart Rate:  [65-77] 65  Resp:  [18] 18  BP: (116-140)/(72-85) 116/75    Intake/Output:    Intake/Output Summary (Last 24 hours) at 2024 0915  Last data filed at 2024 1402  Gross per 24 hour   Intake 838 ml   Output --   Net 838 ml       Exam:  /75 (BP Location: Right arm, Patient Position: Lying)   Pulse 65   Temp 97.5 °F (36.4 °C) (Oral)   Resp 18   Ht 157.2 cm (61.89\")   Wt 118 kg (260 lb)   LMP 2024 (Approximate)   SpO2 98%   Breastfeeding No   BMI 47.72 kg/m²   Patient is examined using the personal protective equipment as per guidelines from infection control for this particular patient as enacted.  Hand washing was performed before and after patient interaction.  General Appearance:    Alert, cooperative, no distress, AAOx3                          Head:    Normocephalic, " without obvious abnormality, atraumatic                           Eyes:    PERRL, conjunctivae/corneas clear, EOM's intact, both eyes                         Throat:   Lips, tongue, gums normal; oral mucosa pink and moist                           Neck:   Supple, symmetrical, trachea midline, no JVD                         Lungs:    Clear to auscultation bilaterally, respirations unlabored                 Chest Wall:    No tenderness or deformity                          Heart:    S1S2 regular                  Abdomen:     Soft, non-tender, bowel sounds active, no masses, no                                                        organomegaly                  Extremities:   Extremities normal, atraumatic, no cyanosis or edema                        Pulses:   Pulses palpable in all extremities                            Skin:   Rash better                  Neurologic:   Alert and oriented X3       Data Review:    I reviewed the patient's new clinical results.  Results from last 7 days   Lab Units 06/07/24  0613 06/06/24  0620 06/05/24  0651 06/04/24  2235   WBC 10*3/mm3 13.27* 8.53 11.71* 5.80   HEMOGLOBIN g/dL 12.6 12.7 12.7 10.6*   PLATELETS 10*3/mm3 274 253 283 243     Results from last 7 days   Lab Units 06/07/24  0613 06/06/24  0620 06/05/24  0651 06/04/24  2235   SODIUM mmol/L 139 139 139 141   POTASSIUM mmol/L 4.1 4.1 4.1 3.9   CHLORIDE mmol/L 111* 111* 107 107   CO2 mmol/L 21.1* 18.9* 20.8* 20.4*   BUN mg/dL 8 7 8 8   CREATININE mg/dL 0.60 0.71 0.77 0.71   CALCIUM mg/dL 9.1 8.9 8.3* 9.2   GLUCOSE mg/dL 142* 148* 131* 107*     Microbiology Results (last 10 days)       Procedure Component Value - Date/Time    Respiratory Panel PCR w/COVID-19(SARS-CoV-2) ADALBERTO/BERNADETTE/KAYE/PAD/COR/DAVEY In-House, NP Swab in UTM/VTM, 2 HR TAT - Swab, Nasopharynx [[id number]]  (Normal) Collected: 06/05/24 1758    Lab Status: Final result Specimen: Swab from Nasopharynx Updated: 06/05/24 2048     ADENOVIRUS, PCR Not Detected     Coronavirus  229E Not Detected     Coronavirus HKU1 Not Detected     Coronavirus NL63 Not Detected     Coronavirus OC43 Not Detected     COVID19 Not Detected     Human Metapneumovirus Not Detected     Human Rhinovirus/Enterovirus Not Detected     Influenza A PCR Not Detected     Influenza B PCR Not Detected     Parainfluenza Virus 1 Not Detected     Parainfluenza Virus 2 Not Detected     Parainfluenza Virus 3 Not Detected     Parainfluenza Virus 4 Not Detected     RSV, PCR Not Detected     Bordetella pertussis pcr Not Detected     Bordetella parapertussis PCR Not Detected     Chlamydophila pneumoniae PCR Not Detected     Mycoplasma pneumo by PCR Not Detected    Narrative:      In the setting of a positive respiratory panel with a viral infection PLUS a negative procalcitonin without other underlying concern for bacterial infection, consider observing off antibiotics or discontinuation of antibiotics and continue supportive care. If the respiratory panel is positive for atypical bacterial infection (Bordetella pertussis, Chlamydophila pneumoniae, or Mycoplasma pneumoniae), consider antibiotic de-escalation to target atypical bacterial infection.    MRSA Screen, PCR (Inpatient) - Swab, Nares [[id number]]  (Normal) Collected: 06/05/24 0310    Lab Status: Final result Specimen: Swab from Nares Updated: 06/05/24 0730     MRSA PCR No MRSA Detected    Narrative:      The negative predictive value of this diagnostic test is high and should only be used to consider de-escalating anti-MRSA therapy. A positive result may indicate colonization with MRSA and must be correlated clinically.    COVID-19, FLU A/B, RSV PCR 1 HR TAT - Swab, Nasopharynx [[id number]]  (Normal) Collected: 06/04/24 2328    Lab Status: Final result Specimen: Swab from Nasopharynx Updated: 06/05/24 0029     COVID19 Not Detected     Influenza A PCR Not Detected     Influenza B PCR Not Detected     RSV, PCR Not Detected    Narrative:      Fact sheet for providers:  https://www.fda.gov/media/244895/download    Fact sheet for patients: https://www.fda.gov/media/330341/download    Test performed by PCR.    Blood Culture - Blood, Arm, Right [[id number]]  (Normal) Collected: 06/04/24 2237    Lab Status: Preliminary result Specimen: Blood from Arm, Right Updated: 06/06/24 2246     Blood Culture No growth at 2 days    Blood Culture - Blood, Arm, Left [[id number]]  (Normal) Collected: 06/04/24 2237    Lab Status: Preliminary result Specimen: Blood from Arm, Left Updated: 06/06/24 2246     Blood Culture No growth at 2 days              Assessment:    Sepsis    Acute lactic acidosis    Cellulitis    Migraine    Seizure disorder    Allergy to sulfa drugs    Drug induced fever    Dermatitis, unspecified, on abdominal wall  1-febrile illness due to Bactrim with worsening rash versus systemic illness due to evolving bacteremia from skin sources  2-progressive itchy rash with pustular areas involving the abdominal wall initially starting in the skin fold and progressing without systemic signs and symptoms of inflammation and infection until after receiving Bactrim is concerning for localized intertriginous rash with secondary infection due to scratching and possible evolving cellulitis  3-morbid obesity  4-history of migraine and seizure disorder  5-history of prednisone allergy  6-leukocytosis likely due to steroids        Recommendations/Discussions:  She is doing well and her rash and fever is better.   Her blood cultures are negative and from ID stand point she can be discharged on oral keflex and fluconazole with out of hospital follow up with Dermatology.  Discussed with patient 's caring nurse at the bedside  Delmis Wu MD  6/7/2024  09:15 EDT    Parts of this note may be an electronic transcription/translation of spoken language to printed text using the Dragon dictation system.

## 2024-06-08 NOTE — OUTREACH NOTE
Prep Survey      Flowsheet Row Responses   [facility] facility patient discharged from? [place]   Is LACE score < 7 ? No   Eligibility Readm Mgmt   Discharge diagnosis Sepsis   Does the patient have one of the following disease processes/diagnoses(primary or secondary)? Sepsis   Does the patient have Home health ordered? No   Is there a DME ordered? No   Prep survey completed? Yes            LOREN DE LA PAZ - Registered Nurse

## 2024-06-08 NOTE — CASE MANAGEMENT/SOCIAL WORK
Case Management Discharge Note      Final Note: dc home         Selected Continued Care - Discharged on 6/7/2024 Admission date: 6/4/2024 - Discharge disposition: Home or Self Care      Destination    No services have been selected for the patient.                Durable Medical Equipment    No services have been selected for the patient.                Dialysis/Infusion    No services have been selected for the patient.                Home Medical Care    No services have been selected for the patient.                Therapy    No services have been selected for the patient.                Community Resources    No services have been selected for the patient.                Community & DME    No services have been selected for the patient.                         Final Discharge Disposition Code: 01 - home or self-care

## 2024-06-09 LAB
BACTERIA SPEC AEROBE CULT: NORMAL
BACTERIA SPEC AEROBE CULT: NORMAL

## 2024-06-11 ENCOUNTER — READMISSION MANAGEMENT (OUTPATIENT)
Dept: CALL CENTER | Facility: HOSPITAL | Age: 39
End: 2024-06-11
Payer: COMMERCIAL

## 2024-06-11 NOTE — OUTREACH NOTE
Sepsis Week 1 Survey      Flowsheet Row Responses   [facility] facility patient discharged from? [place]   Does the patient have one of the following disease processes/diagnoses(primary or secondary)? Sepsis   Week 1 attempt successful? No   Unsuccessful attempts Attempt 1            Rabia LINK - Licensed Nurse

## 2024-06-18 ENCOUNTER — READMISSION MANAGEMENT (OUTPATIENT)
Dept: CALL CENTER | Facility: HOSPITAL | Age: 39
End: 2024-06-18
Payer: COMMERCIAL

## 2024-06-18 NOTE — OUTREACH NOTE
Sepsis Week 1 Survey      Flowsheet Row Responses   [facility] patient discharged from? [place]   Does the patient have one of the following disease processes/diagnoses(primary or secondary)? Sepsis   Week 1 attempt successful? Yes   Call start time 1007   Call end time 1014   Discharge diagnosis Sepsis   Person spoke with today (if not patient) and relationship pt   Meds reviewed with patient/caregiver? Yes   Is the patient having any side effects they believe may be caused by any medication additions or changes? No   Does the patient have all medications related to this admission filled (includes all antibiotics, inhalers, nebulizers,steroids,etc.) Yes   Is the patient taking all medications as directed (includes completed medication regime)? Yes   Does the patient have a primary care provider?  Yes   Does the patient have an appointment with their PCP within 7 days of discharge? Yes   Has the patient kept scheduled appointments due by today? N/A   Psychosocial issues? No   Did the patient receive a copy of their discharge instructions? Yes   Nursing interventions Reviewed instructions with patient   What is the patient's perception of their health status since discharge? Improving   Nursing interventions Nurse provided patient education   Is the patient/caregiver able to teach back TIME? T emperature - higher or lower than normal, I nfection - may have signs and symptoms of an infection, M ental Decline - confused, sleepy, difficult to arouse, E xtremely Ill - severe pain, discomfort, shortness of breath   Nursing interventions Nurse provided patient education   Is patient/caregiver able to teach back steps to recovery at home? Rest and regain strength, Eat a balanced diet, Learn about sepsis(sepsis.org)   Is the patient/caregiver able to teach back signs and symptoms of worsening condition: Fever, Shortness of breath/rapid respiratory rate, Altered mental status(confusion/coma)   Is the  patient/caregiver able to teach back the hierarchy of who to call/visit for symptoms/problems? PCP, Specialist, Home health nurse, Urgent Care, ED, 911 Yes   Week 1 call completed? Yes   Is the patient interested in additional calls from an ambulatory ? No   Would this patient benefit from a Referral to Amb Social Work? No   Wrap up additional comments Pt reports continued rash on abdomen that has not worsened and is not getting better. Pt advised to make a dermatology appt as pt is looking for another PCP. Reviewed AVS/meds with pt.   Call end time 1014            Perla LINK - Registered Nurse

## 2024-06-25 ENCOUNTER — TELEPHONE (OUTPATIENT)
Dept: GASTROENTEROLOGY | Facility: CLINIC | Age: 39
End: 2024-06-25
Payer: COMMERCIAL

## 2024-06-25 ENCOUNTER — OFFICE VISIT (OUTPATIENT)
Dept: GASTROENTEROLOGY | Facility: CLINIC | Age: 39
End: 2024-06-25
Payer: COMMERCIAL

## 2024-06-25 VITALS
HEIGHT: 62 IN | WEIGHT: 259.6 LBS | SYSTOLIC BLOOD PRESSURE: 117 MMHG | BODY MASS INDEX: 47.77 KG/M2 | HEART RATE: 94 BPM | DIASTOLIC BLOOD PRESSURE: 81 MMHG | TEMPERATURE: 97.8 F

## 2024-06-25 DIAGNOSIS — K62.5 RECTAL BLEEDING: ICD-10-CM

## 2024-06-25 DIAGNOSIS — R12 HEARTBURN: ICD-10-CM

## 2024-06-25 DIAGNOSIS — R11.2 NAUSEA AND VOMITING, UNSPECIFIED VOMITING TYPE: ICD-10-CM

## 2024-06-25 DIAGNOSIS — R10.9 LEFT SIDED ABDOMINAL PAIN: Primary | ICD-10-CM

## 2024-06-25 PROCEDURE — 99214 OFFICE O/P EST MOD 30 MIN: CPT | Performed by: NURSE PRACTITIONER

## 2024-06-25 RX ORDER — PANTOPRAZOLE SODIUM 40 MG/1
40 TABLET, DELAYED RELEASE ORAL DAILY
Qty: 90 TABLET | Refills: 3 | Status: SHIPPED | OUTPATIENT
Start: 2024-06-25

## 2024-06-25 RX ORDER — SUCRALFATE 1 G/1
1 TABLET ORAL 4 TIMES DAILY
COMMUNITY
End: 2024-06-25

## 2024-06-25 NOTE — TELEPHONE ENCOUNTER
BESS RAND IN SCHEDULING AT [facility] SCHEDULED 09/12/2024 ARRIVING AT 1130AM,MIRALAX /EGD PREP INSTRUCTIONS HANDED TO THE PT.OK FOR HUB TO RELAY

## 2024-06-25 NOTE — PROGRESS NOTES
Chief Complaint   Patient presents with    Abdominal Pain       HPI    Magdalena Carreon is a  39 y.o. female here to establish care as a new patient for complaints of abdominal pain.    This patient will also follow with Dr. Abarca.    Past medical history of lap lluvia, seizure disorder, migraines along with alcohol abuse.    She was in the emergency room in March for complaints of abdominal pain -epigastric to left upper quadrant.  CT abdomen pelvis with contrast performed with normal-appearing liver, no biliary dilation, normal pancreas, no evidence of colitis - essentially unremarkable.  Labs at that time with normal hemoglobin, LFTs and lipase.  She was diagnosed with gastritis and discharged on omeprazole and Carafate subsequently referred to our service for evaluation.    She then required hospital admission earlier this month after she presented with nausea, vomiting, abdominal rash and chills in the setting of a recent treatment with Bactrim for abdominal wall cellulitis. She was seen by infectious disease and neurology.  Bactrim was discontinued and she was started on broad-spectrum antibiotics.  Infectious workup remain negative. Symptoms improved and she was able to be discharged.    On visit today patient reports left-sided abdominal pain that comes and goes for roughly 1 year.  Pain described as a burning aching sensation.  She can have episodes of vomiting that seem to occur in the morning once or twice a month.  Pain does not always correlate with eating.  She gets occasional heartburn for which she takes over-the-counter Mylanta.  Denies dysphagia, odynophagia, poor appetite or weight loss.  She occasionally takes ibuprofen for headaches.  She has been drinking heavily for the last 12 years but has been reducing alcohol use significantly over the last 6 months.  She is now having up to 3 alcoholic beverages every other day.  This is down from a pint of alcohol a day.  She has been prescribed Flexeril  for suspected muscle spasms with minimal improvement.    She reports daily bowel movements however can frequently experience incomplete evacuation.  Left-sided abdominal pain can often radiate to the left lower quadrant.  She reports episodic rectal bleeding up until about 1 year ago.  No family history of colon cancer.  She has never had an endoscopic evaluation in her lifetime.    Past Medical History:   Diagnosis Date    Abnormal Pap smear of cervix     Migraine     Seizure disorder        Past Surgical History:   Procedure Laterality Date    CEREBRAL ANEURYSM REPAIR      CHOLECYSTECTOMY  2020       Scheduled Meds:     Continuous Infusions: No current facility-administered medications for this visit.      PRN Meds:     Allergies   Allergen Reactions    Prednisone Hives    Bactrim [Sulfamethoxazole-Trimethoprim] Rash       Social History     Socioeconomic History    Marital status: Single   Tobacco Use    Smoking status: Every Day     Current packs/day: 1.00     Average packs/day: 1 pack/day for 25.1 years (25.1 ttl pk-yrs)     Types: Cigarettes     Start date: 6/5/1999   Vaping Use    Vaping status: Never Used   Substance and Sexual Activity    Alcohol use: Yes     Alcohol/week: 10.0 standard drinks of alcohol     Types: 10 Shots of liquor per week    Drug use: Yes     Types: Marijuana    Sexual activity: Yes       Family History   Problem Relation Age of Onset    Colon polyps Mother     Colon polyps Father     Melanoma Father     Diabetes Father     Diabetes Maternal Grandmother     Pancreatic cancer Paternal Grandmother     Diabetes Paternal Grandmother        Review of Systems   HENT:  Negative for trouble swallowing.    Gastrointestinal:  Positive for abdominal pain, anal bleeding, nausea and vomiting.       Vitals:    06/25/24 1055   BP: 117/81   Pulse: 94   Temp: 97.8 °F (36.6 °C)       Physical Exam  Constitutional:       Appearance: She is well-developed.   Abdominal:      General: Bowel sounds are  normal. There is no distension.      Palpations: Abdomen is soft. There is no mass.      Tenderness: There is no abdominal tenderness. There is no guarding.      Hernia: No hernia is present.   Skin:     General: Skin is warm and dry.      Capillary Refill: Capillary refill takes less than 2 seconds.   Neurological:      Mental Status: She is alert and oriented to person, place, and time.   Psychiatric:         Behavior: Behavior normal.     Assessment    Diagnoses and all orders for this visit:    1. Left sided abdominal pain (Primary)  -     Case Request; Standing  -     Verify Bowel Prep Was Successful; Standing  -     Give Tap Water Enema If Bowel Prep Insufficient; Standing  -     Case Request  -     CBC (No Diff)  -     Comprehensive Metabolic Panel  -     Celiac Comprehensive Panel    2. Nausea and vomiting, unspecified vomiting type  -     Case Request; Standing  -     Verify Bowel Prep Was Successful; Standing  -     Give Tap Water Enema If Bowel Prep Insufficient; Standing  -     Case Request  -     CBC (No Diff)  -     Comprehensive Metabolic Panel  -     Celiac Comprehensive Panel    3. Heartburn  -     Case Request; Standing  -     Verify Bowel Prep Was Successful; Standing  -     Give Tap Water Enema If Bowel Prep Insufficient; Standing  -     Case Request  -     CBC (No Diff)  -     Comprehensive Metabolic Panel  -     Celiac Comprehensive Panel    4. Rectal bleeding  -     Case Request; Standing  -     Verify Bowel Prep Was Successful; Standing  -     Give Tap Water Enema If Bowel Prep Insufficient; Standing  -     Case Request  -     CBC (No Diff)    Other orders  -     pantoprazole (PROTONIX) 40 MG EC tablet; Take 1 tablet by mouth Daily.  Dispense: 90 tablet; Refill: 3    Plan    Schedule EGD and colonoscopy with Dr. Abarca for further evaluation of symptoms  Begin Protonix 40 mg once daily in the interim  Recommend alcohol abstinence which patient is working on  Follow an antireflux diet  Labs  today as above  Further recommendations and follow-up pending endoscopic evaluation         ISAAC Ellis  [facility]  [address] [place] [address]  Office: [phone]

## 2024-06-26 LAB
ALBUMIN SERPL-MCNC: 4.3 G/DL (ref 3.5–5.2)
ALBUMIN/GLOB SERPL: 1.9 G/DL
ALP SERPL-CCNC: 91 U/L (ref 39–117)
ALT SERPL-CCNC: 29 U/L (ref 1–33)
AST SERPL-CCNC: 17 U/L (ref 1–32)
BILIRUB SERPL-MCNC: 0.3 MG/DL (ref 0–1.2)
BUN SERPL-MCNC: 9 MG/DL (ref 6–20)
BUN/CREAT SERPL: 13.6 (ref 7–25)
CALCIUM SERPL-MCNC: 10.2 MG/DL (ref 8.6–10.5)
CHLORIDE SERPL-SCNC: 107 MMOL/L (ref 98–107)
CO2 SERPL-SCNC: 22.6 MMOL/L (ref 22–29)
CREAT SERPL-MCNC: 0.66 MG/DL (ref 0.57–1)
EGFRCR SERPLBLD CKD-EPI 2021: 113.2 ML/MIN/1.73
ENDOMYSIUM IGA SER QL: NEGATIVE
ERYTHROCYTE [DISTWIDTH] IN BLOOD BY AUTOMATED COUNT: 12.2 % (ref 12.3–15.4)
GLIADIN PEPTIDE IGA SER-ACNC: 7 UNITS (ref 0–19)
GLIADIN PEPTIDE IGG SER-ACNC: 3 UNITS (ref 0–19)
GLOBULIN SER CALC-MCNC: 2.3 GM/DL
GLUCOSE SERPL-MCNC: 92 MG/DL (ref 65–99)
HCT VFR BLD AUTO: 43.7 % (ref 34–46.6)
HGB BLD-MCNC: 14.1 G/DL (ref 12–15.9)
IGA SERPL-MCNC: 226 MG/DL (ref 87–352)
MCH RBC QN AUTO: 32.3 PG (ref 26.6–33)
MCHC RBC AUTO-ENTMCNC: 32.3 G/DL (ref 31.5–35.7)
MCV RBC AUTO: 100 FL (ref 79–97)
PLATELET # BLD AUTO: 383 10*3/MM3 (ref 140–450)
POTASSIUM SERPL-SCNC: 4.6 MMOL/L (ref 3.5–5.2)
PROT SERPL-MCNC: 6.6 G/DL (ref 6–8.5)
RBC # BLD AUTO: 4.37 10*6/MM3 (ref 3.77–5.28)
SODIUM SERPL-SCNC: 138 MMOL/L (ref 136–145)
TTG IGA SER-ACNC: <2 U/ML (ref 0–3)
TTG IGG SER-ACNC: <2 U/ML (ref 0–5)
WBC # BLD AUTO: 7.17 10*3/MM3 (ref 3.4–10.8)

## 2024-06-26 NOTE — PROGRESS NOTES
Please inform the patient lab work shows no evidence of celiac disease.  No anemia.  Normal liver function test.  Await endoscopic evaluation.

## 2024-06-27 ENCOUNTER — READMISSION MANAGEMENT (OUTPATIENT)
Dept: CALL CENTER | Facility: HOSPITAL | Age: 39
End: 2024-06-27
Payer: COMMERCIAL

## 2024-06-27 NOTE — OUTREACH NOTE
Sepsis Week 2 Survey      Flowsheet Row Responses   [facility] patient discharged from? [place]   Does the patient have one of the following disease processes/diagnoses(primary or secondary)? Sepsis   Week 2 attempt successful? Yes   Call start time 1437   Call end time 1439   Discharge diagnosis Sepsis   Meds reviewed with patient/caregiver? Yes   Is the patient having any side effects they believe may be caused by any medication additions or changes? No   Does the patient have all medications related to this admission filled (includes all antibiotics, inhalers, nebulizers,steroids,etc.) Yes   Is the patient taking all medications as directed (includes completed medication regime)? Yes   Does the patient have a primary care provider?  Yes   Has the patient kept scheduled appointments due by today? Yes   Has home health visited the patient within 72 hours of discharge? N/A   Psychosocial issues? No   Did the patient receive a copy of their discharge instructions? Yes   Nursing interventions Reviewed instructions with patient   What is the patient's perception of their health status since discharge? Improving   Nursing interventions Nurse provided patient education   Is the patient/caregiver able to teach back TIME? T emperature - higher or lower than normal, I nfection - may have signs and symptoms of an infection, M ental Decline - confused, sleepy, difficult to arouse, E xtremely Ill - severe pain, discomfort, shortness of breath   Nursing interventions Nurse provided reassurance to patient   Is patient/caregiver able to teach back steps to recovery at home? Set small, achievable goals for return to baseline health, Rest and regain strength   Is the patient/caregiver able to teach back signs and symptoms of worsening condition: Fever, Hyperthermia, Rapid heart rate (>90), Shortness of breath/rapid respiratory rate, Altered mental status(confusion/coma)   Is the patient/caregiver able to teach back the  hierarchy of who to call/visit for symptoms/problems? PCP, Specialist, Home health nurse, Urgent Care, ED, 911 Yes   Week 2 call completed? Yes   Graduated Yes   Is the patient interested in additional calls from an ambulatory ? No   Would this patient benefit from a Referral to [facility] Social Work? No   Call end time [phone]            Rabia LINK - Licensed Nurse

## 2024-06-28 ENCOUNTER — TELEPHONE (OUTPATIENT)
Dept: GASTROENTEROLOGY | Facility: CLINIC | Age: 39
End: 2024-06-28
Payer: COMMERCIAL

## 2024-06-28 NOTE — TELEPHONE ENCOUNTER
----- Message from Lis Ashby sent at 6/26/2024  4:27 PM EDT -----  Please inform the patient lab work shows no evidence of celiac disease.  No anemia.  Normal liver function test.  Await endoscopic evaluation.

## 2024-08-19 ENCOUNTER — OFFICE VISIT (OUTPATIENT)
Dept: NEUROLOGY | Facility: CLINIC | Age: 39
End: 2024-08-19
Payer: COMMERCIAL

## 2024-08-19 VITALS
OXYGEN SATURATION: 99 % | SYSTOLIC BLOOD PRESSURE: 124 MMHG | HEIGHT: 62 IN | DIASTOLIC BLOOD PRESSURE: 76 MMHG | HEART RATE: 86 BPM | WEIGHT: 260 LBS | BODY MASS INDEX: 47.84 KG/M2

## 2024-08-19 DIAGNOSIS — G43.009 MIGRAINE WITHOUT AURA AND WITHOUT STATUS MIGRAINOSUS, NOT INTRACTABLE: Primary | ICD-10-CM

## 2024-08-19 DIAGNOSIS — G40.909 SEIZURE DISORDER: ICD-10-CM

## 2024-08-19 PROCEDURE — 99215 OFFICE O/P EST HI 40 MIN: CPT | Performed by: PSYCHIATRY & NEUROLOGY

## 2024-08-19 RX ORDER — TOPIRAMATE 25 MG/1
25 TABLET ORAL 2 TIMES DAILY
Qty: 60 TABLET | Refills: 2 | Status: SHIPPED | OUTPATIENT
Start: 2024-08-19

## 2024-08-19 RX ORDER — LEVETIRACETAM 1000 MG/1
1000 TABLET ORAL 2 TIMES DAILY
Qty: 60 TABLET | Refills: 2 | Status: SHIPPED | OUTPATIENT
Start: 2024-08-19

## 2024-08-19 RX ORDER — RIZATRIPTAN BENZOATE 5 MG/1
5 TABLET ORAL ONCE AS NEEDED
Qty: 12 TABLET | Refills: 2 | Status: SHIPPED | OUTPATIENT
Start: 2024-08-19

## 2024-08-19 NOTE — PATIENT INSTRUCTIONS
Migraine Headache  A migraine headache is an intense, throbbing pain on one side or both sides of the head. Migraine headaches may also cause other symptoms, such as nausea, vomiting, and sensitivity to light and noise. A migraine headache can last from 4 hours to 3 days. Talk with your doctor about what things may bring on (trigger) your migraine headaches.  What are the causes?  The exact cause of this condition is not known. However, a migraine may be caused when nerves in the brain become irritated and release chemicals that cause inflammation of blood vessels. This inflammation causes pain. This condition may be triggered or caused by:  Drinking alcohol.  Smoking.  Taking medicines, such as:  Medicine used to treat chest pain (nitroglycerin).  Birth control pills.  Estrogen.  Certain blood pressure medicines.  Eating or drinking products that contain nitrates, glutamate, aspartame, or tyramine. Aged cheeses, chocolate, or caffeine may also be triggers.  Doing physical activity.  Other things that may trigger a migraine headache include:  Menstruation.  Pregnancy.  Hunger.  Stress.  Lack of sleep or too much sleep.  Weather changes.  Fatigue.  What increases the risk?  The following factors may make you more likely to experience migraine headaches:  Being a certain age. This condition is more common in people who are 25-55 years old.  Being female.  Having a family history of migraine headaches.  Being .  Having a mental health condition, such as depression or anxiety.  Being obese.  What are the signs or symptoms?  The main symptom of this condition is pulsating or throbbing pain. This pain may:  Happen in any area of the head, such as on one side or both sides.  Interfere with daily activities.  Get worse with physical activity.  Get worse with exposure to bright lights or loud noises.  Other symptoms may include:  Nausea.  Vomiting.  Dizziness.  General sensitivity to bright lights, loud noises, or  smells.  Before you get a migraine headache, you may get warning signs (an aura). An aura may include:  Seeing flashing lights or having blind spots.  Seeing bright spots, halos, or zigzag lines.  Having tunnel vision or blurred vision.  Having numbness or a tingling feeling.  Having trouble talking.  Having muscle weakness.  Some people have symptoms after a migraine headache (postdromal phase), such as:  Feeling tired.  Difficulty concentrating.  How is this diagnosed?  A migraine headache can be diagnosed based on:  Your symptoms.  A physical exam.  Tests, such as:  CT scan or an MRI of the head. These imaging tests can help rule out other causes of headaches.  Taking fluid from the spine (lumbar puncture) and analyzing it (cerebrospinal fluid analysis, or CSF analysis).  How is this treated?  This condition may be treated with medicines that:  Relieve pain.  Relieve nausea.  Prevent migraine headaches.  Treatment for this condition may also include:  Acupuncture.  Lifestyle changes like avoiding foods that trigger migraine headaches.  Biofeedback.  Cognitive behavioral therapy.  Follow these instructions at home:  Medicines  Take over-the-counter and prescription medicines only as told by your health care provider.  Ask your health care provider if the medicine prescribed to you:  Requires you to avoid driving or using heavy machinery.  Can cause constipation. You may need to take these actions to prevent or treat constipation:  Drink enough fluid to keep your urine pale yellow.  Take over-the-counter or prescription medicines.  Eat foods that are high in fiber, such as beans, whole grains, and fresh fruits and vegetables.  Limit foods that are high in fat and processed sugars, such as fried or sweet foods.  Lifestyle  Do not drink alcohol.  Do not use any products that contain nicotine or tobacco, such as cigarettes, e-cigarettes, and chewing tobacco. If you need help quitting, ask your health care  provider.  Get at least 8 hours of sleep every night.  Find ways to manage stress, such as meditation, deep breathing, or yoga.  General instructions               Keep a journal to find out what may trigger your migraine headaches. For example, write down:  What you eat and drink.  How much sleep you get.  Any change to your diet or medicines.  If you have a migraine headache:  Avoid things that make your symptoms worse, such as bright lights.  It may help to lie down in a dark, quiet room.  Do not drive or use heavy machinery.  Ask your health care provider what activities are safe for you while you are experiencing symptoms.  Keep all follow-up visits as told by your health care provider. This is important.  Contact a health care provider if:  You develop symptoms that are different or more severe than your usual migraine headache symptoms.  You have more than 15 headache days in one month.  Get help right away if:  Your migraine headache becomes severe.  Your migraine headache lasts longer than 72 hours.  You have a fever.  You have a stiff neck.  You have vision loss.  Your muscles feel weak or like you cannot control them.  You start to lose your balance often.  You have trouble walking.  You faint.  You have a seizure.  Summary  A migraine headache is an intense, throbbing pain on one side or both sides of the head. Migraines may also cause other symptoms, such as nausea, vomiting, and sensitivity to light and noise.  This condition may be treated with medicines and lifestyle changes. You may also need to avoid certain things that trigger a migraine headache.  Keep a journal to find out what may trigger your migraine headaches.  Contact your health care provider if you have more than 15 headache days in a month or you develop symptoms that are different or more severe than your usual migraine headache symptoms.  This information is not intended to replace advice given to you by your health care provider. Make  sure you discuss any questions you have with your health care provider.  Document Revised: 04/10/2020 Document Reviewed: 01/30/2020  Elsevier Patient Education © 2020 Elsevier Inc.    In general, we recommend using good judgement when you are doing certain activities and to avoid those activities that if you were to have a seizure, you could harm yourself or others. In the state [facility] [place], it is the law that you cannot drive within 90 days of a seizure. We also recommend not standing over open flames, not getting on high ladders or the roof, not swimming or taking baths by yourself (showers are ok) and not operating heavy machinery or power tools.  [facility]  Suzie Zimmerman MD  Neurology clinic  [phone]    With anti-seizure medications, you may initially notice side effects of fatigue, drowsiness, unsteadiness, and dizziness.  Other possible side effects include nausea, abdominal pain, headache, blurry or double vision, slurred speech and mood changes.  Generally, patients will noticed these symptoms when the medication is first started or with higher doses and will go away with time.    It is import to consistently take your medication every day.  Missing just one dose may put you at risk for a breakthrough seizure.  Consider using reminders on your phone or a pill box.    If you develop a rash, please call the neurology clinic immediately or notify another healthcare professional, as this may be potentially life-threatening.  If you are unable to reach a healthcare professional, go to the emergency room immediately for further evaluation.    If you develop thoughts of wanting to hurt yourself or others, please call the neurology clinic immediately to notify another healthcare professional.  If you are unable to reach a healthcare professional, go to the emergency room immediately for further evaluation.    It is the [place] state law that you cannot drive within 90 days of a  seizure.    You should avoid certain activities that if you were to have a seizure, you could harm yourself or others. In general, it is recommended that you avoid operating heavy machinery or power tools, swimming or taking baths by yourself (showers are ok), don't stand over open flames, don't get on high ladders or the roof.  I also recommend to avoid sleeping on your stomach.    For further information on epilepsy and resources available to patients and their families, please visit the [facility] at www.efky.org or call [phone].    **Check out the [facility]'s monthly Art Group Gathering.  They are located at [facility], [address] [place] [address].  Call Johana Alarcon at [phone] or email her at [email or web address] for the dates of future gatherings.**      **If you have having memory problems, consider HOBSCOTCH (Home-Based Self-management and Cognitive Training Changes lives).  It is an 8 week self-management program for adults with epilepsy and memory problems.  The program is free at the [facility].  Contact Gali Diaz at [phone] or [email or web address].**

## 2024-08-19 NOTE — ASSESSMENT & PLAN NOTE
"39 year old right handed woman with seizures and migraines and recent hospital follow up in 6/2024.  Of note she was evaluated by my colleagues in the hospital for her migraines in 6/2024 and I reviewed their notes. She reports a history of seizures starting when she was 24 years old related to history of brain aneurysm she she was 5 years old for which she had surgery in [place].  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  She had another seizure around a year later and does not remember where that happened and that is when she got established with her first neurologist who she can't remember their name.  She was started by that neurologist on levetiracetam 1000 mg BID which was continued by Dr. Bryson her prior neurologist at same dose.  She thinks her most recent seizure was at night time when she was sleeping around 2-3 years ago and does not think she has had any further seizures.  She has always had grandmal seizures.  No known family history of seizures or epilepsy.  She does report history of head trauma in childhood playing \"chicken\" when she fell backwards and hit her head.  She is tolerating the levetiracetam 1000 mg BID well and is able to afford the medicine well.  She has noticed that missing doses of medications can certainly trigger seizures.  I will continue her current dose of levetiracetam as prescribed and advised her not to miss any doses as seizures can be potentially fatal and discussed SUDEP.  Discussed seizure precautions.   "

## 2024-08-19 NOTE — ASSESSMENT & PLAN NOTE
She also has migraines and headaches since childhood.  Her headaches are all over her head.  She has sharp, throbbing and dull headaches which she rates as 10/10 on pain scale 1-10 when most severe with associated light and sound sensitivity along with nausea and rare vomiting.  She is currently getting 1-3 severe migraines per month and has 3 headache days per week on average.  She has not really tried any medications for her migraines other than gabapentin which was not helpful.  There is family history of migraines in her mother.  She is not sure about triggers for her migraines.  She had eye examination done without evidence of papilledema in the hospital.  Her headaches responded to migraine medications in the hospital.  She has had tubal ligation and is not able to get pregnant.  I will start her on topiramate for migraine and seizure prevention after discussing potential side effects.  I will start her on rizatriptan for acute treatment of her migraines.  Discussed migraine triggers and lifestyle modifications.

## 2024-08-19 NOTE — PROGRESS NOTES
Chief Complaint  Seizures (Hasn't had sz in 2yrs- keppra 1000mg BID- finding new neuro to reestablish care- hasn't been to neuro in 15yrs--had anyresum as a kid- which caused sz-unsure if that is causing her memory concerns. Reports comprehension isn't always there. ) and Headache (Reports having migraines since she was kid- reports getting a knot on the back of her head before  getting a migraine- hasn't seen neuro in 15yrs-  tried all triptans back then, little help- now has done ibuprofen/tylenol- hot showers- tiger balm on the back of her neck- never consistent- reports becoming foggy/freeze that lasts-)    Subjective          Magdalena Carreon presents to [facility] for   HISTORY OF PRESENT ILLNESS:    Magdalena Carreon is a 39 year old right handed woman who presents to neurology clinic for initial evaluation and treatment of seizures and migraines and recent hospital follow up in 6/2024.  Of note she was evaluated by my colleagues in the hospital for her migraines in 6/2024 and I reviewed their notes. She reports a history of seizures starting when she was 24 years old related to history of brain aneurysm she she was 5 years old for which she had surgery in [place].  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  She had another seizure around a year later and does not remember where that happened and that is when she got established with her first neurologist who she can't remember their name.  She was started by that neurologist on levetiracetam 1000 mg BID which was continued by Dr. Bryson her prior neurologist at same dose.  She thinks her most recent seizure was at night time when she was sleeping around 2-3 years ago and does not think she has had any further seizures.  She has always had grandmal seizures.  No known family history of seizures or epilepsy.  She does report history of head trauma in  "childhood playing \"chicken\" when she fell backwards and hit her head.  She is tolerating the levetiracetam 1000 mg BID well and is able to afford the medicine well.  She has noticed that missing doses of medications can certainly trigger seizures.      She also has migraines and headaches since childhood.  Her headaches are all over her head.  She has sharp, throbbing and dull headaches which she rates as 10/10 on pain scale 1-10 when most severe with associated light and sound sensitivity along with nausea and rare vomiting.  She is currently getting 1-3 severe migraines per month and has 3 headache days per week on average.  She has not really tried any medications for her migraines other than gabapentin which was not helpful.  There is family history of migraines in her mother.  She is not sure about triggers for her migraines.  She had eye examination done without evidence of papilledema in the hospital.  Her headaches responded to migraine medications in the hospital.  She has had tubal ligation and is not able to get pregnant.         Past Medical History:   Diagnosis Date    Abnormal Pap smear of cervix     Cluster headache     Headache, tension-type     Migraine     Seizure disorder         Family History   Problem Relation Age of Onset    Colon polyps Mother     Migraines Mother     Stroke Mother     Colon polyps Father     Melanoma Father     Diabetes Father     Diabetes Maternal Grandmother     Pancreatic cancer Paternal Grandmother     Diabetes Paternal Grandmother         Social History     Socioeconomic History    Marital status: Single   Tobacco Use    Smoking status: Every Day     Current packs/day: 1.00     Average packs/day: 1 pack/day for 25.2 years (25.2 ttl pk-yrs)     Types: Cigarettes     Start date: 6/5/1999   Vaping Use    Vaping status: Never Used   Substance and Sexual Activity    Alcohol use: Yes     Alcohol/week: 10.0 standard drinks of alcohol     Types: 10 Shots of liquor per week    " "Drug use: Yes     Types: Marijuana     Comment: Smoking marijuana shouldn't fall under substance abuse.    Sexual activity: Yes     Partners: Male     Birth control/protection: Tubal ligation        I have reviewed and confirmed the accuracy of the ROS as documented by the MA/LPN/RN Suzie Zimmerman MD   Review of Systems   Constitutional:  Negative for activity change and appetite change.   HENT:  Negative for trouble swallowing and voice change.    Eyes:  Negative for blurred vision, double vision and pain.   Gastrointestinal:  Negative for nausea and vomiting.   Neurological:  Positive for dizziness, speech difficulty, light-headedness, headache and memory problem. Negative for tremors, seizures, syncope, facial asymmetry, weakness, numbness and confusion.   Psychiatric/Behavioral:  Positive for behavioral problems and depressed mood. Negative for agitation, decreased concentration, dysphoric mood, hallucinations, self-injury, sleep disturbance, suicidal ideas, negative for hyperactivity and stress. The patient is nervous/anxious.         Objective   Vital Signs:   /76   Pulse 86   Ht 157.5 cm (62.01\")   Wt 118 kg (260 lb)   SpO2 99%   BMI 47.54 kg/m²       PHYSICAL EXAM:    General   Mental Status - Alert. General Appearance - Well groomed, Oriented and Cooperative. Orientation - Oriented X3.       Head and Neck  Head - normocephalic, atraumatic with no lesions or palpable masses.  Neck    Global Assessment - supple.       Eye   Sclera/Conjunctiva - Bilateral - Normal.    ENMT  Mouth and Throat   Oral Cavity/Oropharynx: Oropharynx - the soft palate,uvula and tongue are normal in appearance.    Chest and Lung Exam   Chest - lung clear to auscultation bilaterally.    Cardiovascular   Cardiovascular examination reveals  - normal heart sounds, regular rate and rhythm.    Neurologic   Mental Status: Speech - Normal. Cognitive function - appropriate fund of knowledge. No impairment of attention, Impairment " of concentration, impairment of long term memory or impairment of short term memory.  Cranial Nerves:   II Optic: Visual acuity - Left - Normal. Right - Normal. Visual fields - Normal (to confrontation).  III Oculomotor: Pupillary constriction - Left - Normal. Right - Normal.  VII Facial: - Normal Bilaterally.   IX Glossopharyngeal / X Vagus - Normal.  XI Accessory: Trapezius - Bilateral - Normal. Sternocleidomastoid - Bilateral - Normal.  XII Hypoglossal - Bilateral - Normal.  Eye Movements: - Normal Bilaterally.  Sensory:   Light Touch: Intact - Globally.  Motor:   Bulk and Contour: - Normal.  Tone: - Normal.  Tremor: Not present.  Strength: 5/5 normal muscle strength - All Muscles.   General Assessment of Reflexes: - deep tendon reflexes are normal. Coordination - No Impairment of finger-to-nose or Impairment of rapid alternating movements. Gait - Normal.       Result Review :                 Assessment and Plan    Problem List Items Addressed This Visit       Migraine - Primary    Current Assessment & Plan     She also has migraines and headaches since childhood.  Her headaches are all over her head.  She has sharp, throbbing and dull headaches which she rates as 10/10 on pain scale 1-10 when most severe with associated light and sound sensitivity along with nausea and rare vomiting.  She is currently getting 1-3 severe migraines per month and has 3 headache days per week on average.  She has not really tried any medications for her migraines other than gabapentin which was not helpful.  There is family history of migraines in her mother.  She is not sure about triggers for her migraines.  She had eye examination done without evidence of papilledema in the hospital.  Her headaches responded to migraine medications in the hospital.  She has had tubal ligation and is not able to get pregnant.  I will start her on topiramate for migraine and seizure prevention after discussing potential side effects.  I will start  "her on rizatriptan for acute treatment of her migraines.  Discussed migraine triggers and lifestyle modifications.           Relevant Medications    topiramate (TOPAMAX) 25 MG tablet    rizatriptan (MAXALT) 5 MG tablet    levETIRAcetam (KEPPRA) 1000 MG tablet    Seizure disorder    Current Assessment & Plan     39 year old right handed woman with seizures and migraines and recent hospital follow up in 6/2024.  Of note she was evaluated by my colleagues in the hospital for her migraines in 6/2024 and I reviewed their notes. She reports a history of seizures starting when she was 24 years old related to history of brain aneurysm she she was 5 years old for which she had surgery in [place].  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  She had another seizure around a year later and does not remember where that happened and that is when she got established with her first neurologist who she can't remember their name.  She was started by that neurologist on levetiracetam 1000 mg BID which was continued by Dr. Bryson her prior neurologist at same dose.  She thinks her most recent seizure was at night time when she was sleeping around 2-3 years ago and does not think she has had any further seizures.  She has always had grandmal seizures.  No known family history of seizures or epilepsy.  She does report history of head trauma in childhood playing \"chicken\" when she fell backwards and hit her head.  She is tolerating the levetiracetam 1000 mg BID well and is able to afford the medicine well.  She has noticed that missing doses of medications can certainly trigger seizures.  I will continue her current dose of levetiracetam as prescribed and advised her not to miss any doses as seizures can be potentially fatal and discussed SUDEP.  Discussed seizure precautions.          Relevant Medications    topiramate (TOPAMAX) 25 MG tablet    " levETIRAcetam (KEPPRA) 1000 MG tablet       I spent 43 minutes caring for Magdalena on this date of service. This time includes time spent by me in the following activities:preparing for the visit, reviewing tests, obtaining and/or reviewing a separately obtained history, performing a medically appropriate examination and/or evaluation , counseling and educating the patient/family/caregiver, ordering medications, tests, or procedures, documenting information in the medical record, and care coordination    Follow Up   Return in about 3 months (around 11/19/2024).  Patient was given instructions and counseling regarding her condition or for health maintenance advice. Please see specific information pulled into the AVS if appropriate.

## 2024-08-20 ENCOUNTER — PRIOR AUTHORIZATION (OUTPATIENT)
Dept: NEUROLOGY | Facility: CLINIC | Age: 39
End: 2024-08-20
Payer: COMMERCIAL

## 2024-08-20 NOTE — TELEPHONE ENCOUNTER
Approved today by Cumed Michael 2017  PA Case: [id number], Status: Approved, Coverage Starts on: 8/20/2024 12:00:00 AM, Coverage Ends on: 8/20/2025 12:00:00 AM.  Authorization Expiration Date: 8/19/2025

## 2024-09-12 ENCOUNTER — HOSPITAL ENCOUNTER (OUTPATIENT)
Facility: SURGERY CENTER | Age: 39
Setting detail: HOSPITAL OUTPATIENT SURGERY
Discharge: HOME | End: 2024-09-12
Attending: INTERNAL MEDICINE | Admitting: INTERNAL MEDICINE
Payer: COMMERCIAL

## 2024-09-12 ENCOUNTER — ANESTHESIA (OUTPATIENT)
Dept: SURGERY | Facility: SURGERY CENTER | Age: 39
End: 2024-09-12
Payer: COMMERCIAL

## 2024-09-12 ENCOUNTER — ANESTHESIA EVENT (OUTPATIENT)
Dept: SURGERY | Facility: SURGERY CENTER | Age: 39
End: 2024-09-12
Payer: COMMERCIAL

## 2024-09-12 VITALS
DIASTOLIC BLOOD PRESSURE: 78 MMHG | OXYGEN SATURATION: 99 % | BODY MASS INDEX: 47.29 KG/M2 | TEMPERATURE: 98.2 F | WEIGHT: 257 LBS | HEART RATE: 84 BPM | HEIGHT: 62 IN | RESPIRATION RATE: 18 BRPM | SYSTOLIC BLOOD PRESSURE: 120 MMHG

## 2024-09-12 DIAGNOSIS — K62.5 RECTAL BLEEDING: ICD-10-CM

## 2024-09-12 DIAGNOSIS — R10.9 LEFT SIDED ABDOMINAL PAIN: ICD-10-CM

## 2024-09-12 DIAGNOSIS — R12 HEARTBURN: ICD-10-CM

## 2024-09-12 DIAGNOSIS — R11.2 NAUSEA AND VOMITING, UNSPECIFIED VOMITING TYPE: ICD-10-CM

## 2024-09-12 LAB
B-HCG UR QL: NEGATIVE
EXPIRATION DATE: NORMAL
INTERNAL NEGATIVE CONTROL: NORMAL
INTERNAL POSITIVE CONTROL: NORMAL
Lab: NORMAL

## 2024-09-12 PROCEDURE — 25810000003 LACTATED RINGERS PER 1000 ML: Performed by: INTERNAL MEDICINE

## 2024-09-12 PROCEDURE — 25010000002 GLYCOPYRROLATE 1 MG/5ML SOLUTION: Performed by: ANESTHESIOLOGY

## 2024-09-12 PROCEDURE — 88305 TISSUE EXAM BY PATHOLOGIST: CPT | Performed by: INTERNAL MEDICINE

## 2024-09-12 PROCEDURE — 43239 EGD BIOPSY SINGLE/MULTIPLE: CPT | Performed by: INTERNAL MEDICINE

## 2024-09-12 PROCEDURE — 45380 COLONOSCOPY AND BIOPSY: CPT | Performed by: INTERNAL MEDICINE

## 2024-09-12 PROCEDURE — 25010000002 PROPOFOL 10 MG/ML EMULSION: Performed by: ANESTHESIOLOGY

## 2024-09-12 PROCEDURE — 25010000002 PROPOFOL 1000 MG/100ML EMULSION: Performed by: ANESTHESIOLOGY

## 2024-09-12 PROCEDURE — S0260 H&P FOR SURGERY: HCPCS | Performed by: INTERNAL MEDICINE

## 2024-09-12 PROCEDURE — 81025 URINE PREGNANCY TEST: CPT | Performed by: INTERNAL MEDICINE

## 2024-09-12 PROCEDURE — 25810000003 LACTATED RINGERS PER 1000 ML: Performed by: ANESTHESIOLOGY

## 2024-09-12 RX ORDER — SODIUM CHLORIDE, SODIUM LACTATE, POTASSIUM CHLORIDE, CALCIUM CHLORIDE 600; 310; 30; 20 MG/100ML; MG/100ML; MG/100ML; MG/100ML
INJECTION, SOLUTION INTRAVENOUS CONTINUOUS PRN
Status: DISCONTINUED | OUTPATIENT
Start: 2024-09-12 | End: 2024-09-12 | Stop reason: SURG

## 2024-09-12 RX ORDER — SODIUM CHLORIDE, SODIUM LACTATE, POTASSIUM CHLORIDE, CALCIUM CHLORIDE 600; 310; 30; 20 MG/100ML; MG/100ML; MG/100ML; MG/100ML
1000 INJECTION, SOLUTION INTRAVENOUS CONTINUOUS
Status: DISCONTINUED | OUTPATIENT
Start: 2024-09-12 | End: 2024-09-12 | Stop reason: HOSPADM

## 2024-09-12 RX ORDER — GLYCOPYRROLATE 0.2 MG/ML
INJECTION INTRAMUSCULAR; INTRAVENOUS AS NEEDED
Status: DISCONTINUED | OUTPATIENT
Start: 2024-09-12 | End: 2024-09-12 | Stop reason: SURG

## 2024-09-12 RX ORDER — LIDOCAINE HYDROCHLORIDE 20 MG/ML
INJECTION, SOLUTION INFILTRATION; PERINEURAL AS NEEDED
Status: DISCONTINUED | OUTPATIENT
Start: 2024-09-12 | End: 2024-09-12 | Stop reason: SURG

## 2024-09-12 RX ORDER — SODIUM CHLORIDE 0.9 % (FLUSH) 0.9 %
10 SYRINGE (ML) INJECTION AS NEEDED
Status: DISCONTINUED | OUTPATIENT
Start: 2024-09-12 | End: 2024-09-12 | Stop reason: HOSPADM

## 2024-09-12 RX ORDER — LIDOCAINE HYDROCHLORIDE 10 MG/ML
0.5 INJECTION, SOLUTION INFILTRATION; PERINEURAL ONCE AS NEEDED
Status: DISCONTINUED | OUTPATIENT
Start: 2024-09-12 | End: 2024-09-12 | Stop reason: HOSPADM

## 2024-09-12 RX ORDER — PROPOFOL 10 MG/ML
INJECTION, EMULSION INTRAVENOUS AS NEEDED
Status: DISCONTINUED | OUTPATIENT
Start: 2024-09-12 | End: 2024-09-12 | Stop reason: SURG

## 2024-09-12 RX ADMIN — SODIUM CHLORIDE, POTASSIUM CHLORIDE, SODIUM LACTATE AND CALCIUM CHLORIDE 1000 ML: 600; 310; 30; 20 INJECTION, SOLUTION INTRAVENOUS at 10:33

## 2024-09-12 RX ADMIN — LIDOCAINE HYDROCHLORIDE 60 MG: 20 INJECTION, SOLUTION INFILTRATION; PERINEURAL at 10:41

## 2024-09-12 RX ADMIN — PROPOFOL 200 MCG/KG/MIN: 10 INJECTION, EMULSION INTRAVENOUS at 10:42

## 2024-09-12 RX ADMIN — PROPOFOL 120 MG: 10 INJECTION, EMULSION INTRAVENOUS at 10:42

## 2024-09-12 RX ADMIN — SODIUM CHLORIDE, POTASSIUM CHLORIDE, SODIUM LACTATE AND CALCIUM CHLORIDE: 600; 310; 30; 20 INJECTION, SOLUTION INTRAVENOUS at 10:39

## 2024-09-12 RX ADMIN — GLYCOPYRROLATE 0.2 MG: 0.2 INJECTION, SOLUTION INTRAMUSCULAR; INTRAVENOUS at 10:41

## 2024-09-12 NOTE — DISCHARGE INSTRUCTIONS
ENDOSCOPY - EGD/COLONOSCOPY       ADULT CARE DISCHARGE  INSTRUCTIONS     Symptoms you may temporarily experience:      Sore Throat     Hoarseness     Bloating/Cramping     Dizziness     IV Irritation/tenderness     Gas or Belching     Slight fever     Small amount of blood in vomit or stool       Call Your Doctor for the following Problems: ______________________     ________________     Fever of 101 degrees or higher       Sharp abdominal  pain     Red streak up the arm from the IV site     Severe cramping        Large amount of blood in stool or vomit       Instructions for the next 24 hours after your Procedure:     Adult supervision     Do NOT drink any alcohol      Do not work today     NO important decisions     DO NOT sign any legal documents     You may shower/ bathe       DO NOT  DRIVE or operate machinery     Resume normal activity tomorrow       Discharge  Diet:     Avoid spicy/ greasy foods     Avoid any food that will cause more gas or bloating       *** Seek IMMEDIATE medical attention and call 911 if you develop symptoms such as:     Chest pain     Shortness of breath     Severe bleeding colon polkyp

## 2024-09-12 NOTE — ANESTHESIA PREPROCEDURE EVALUATION
Anesthesia Evaluation     NPO Solid Status: > 8 hours  NPO Liquid Status: > 2 hours           Airway   Mallampati: II  TM distance: >3 FB  Large neck circumference  Dental    (+) poor dentition    Comment: Edentulous upper       Pulmonary    (+) a smoker Current, cigarettes,  Cardiovascular   Exercise tolerance: good (4-7 METS)    Rhythm: regular  Rate: normal        Neuro/Psych  (+) seizures well controlled, headaches  GI/Hepatic/Renal/Endo    (+) GERD well controlled, GI bleeding     Musculoskeletal     Abdominal    Substance History      OB/GYN          Other                    Anesthesia Plan    ASA 3     MAC     intravenous induction     Anesthetic plan, risks, benefits, and alternatives have been provided, discussed and informed consent has been obtained with: patient.    CODE STATUS:

## 2024-09-12 NOTE — H&P
[facility]  Pre Procedure History & Physical    Chief Complaint:   Time for my colonoscopy    Subjective     HPI:   39 y.o. female presenting to endoscopy unit today for surveillance colonoscopy.    Past Medical History:   Past Medical History:   Diagnosis Date    Abdominal pain     left upper pain    Abnormal Pap smear of cervix     Bronchitis     Cluster headache     GERD (gastroesophageal reflux disease)     Headache, tension-type     Hemorrhoids     Lower abdominal pain, unspecified     Migraine     Seizure disorder     last seizure 2 years ago       Family History:  Family History   Problem Relation Age of Onset    Colon polyps Mother     Migraines Mother     Stroke Mother     Colon polyps Father     Melanoma Father     Diabetes Father     Diabetes Maternal Grandmother     Pancreatic cancer Paternal Grandmother     Diabetes Paternal Grandmother        Social History:   reports that she has been smoking cigarettes. She started smoking about 25 years ago. She has a 25.3 pack-year smoking history. She does not have any smokeless tobacco history on file. She reports current alcohol use of about 10.0 standard drinks of alcohol per week. She reports current drug use. Drug: Marijuana.    Medications:   Medications Prior to Admission   Medication Sig Dispense Refill Last Dose    baclofen (LIORESAL) 10 MG tablet Has new Rx that she has not started as of June 2024 9/11/2024    ibuprofen (ADVIL,MOTRIN) 200 MG tablet Take 2 tablets by mouth Every 6 (Six) Hours As Needed for Mild Pain.   Past Week    levETIRAcetam (KEPPRA) 1000 MG tablet Take 1 tablet by mouth 2 (Two) Times a Day. 60 tablet 2 9/12/2024    pantoprazole (PROTONIX) 40 MG EC tablet Take 1 tablet by mouth Daily. 90 tablet 3 Past Week    rizatriptan (MAXALT) 5 MG tablet Take 1 tablet by mouth 1 (One) Time As Needed for Migraine. May repeat in 2 hours if needed 12 tablet 2 Past Week       Allergies:  Prednisone and Bactrim  "[sulfamethoxazole-trimethoprim]    Objective     Blood pressure 126/95, pulse 77, temperature 97.5 °F (36.4 °C), temperature source Temporal, resp. rate 16, height 157.5 cm (62\"), weight 117 kg (257 lb), last menstrual period 09/06/2024, SpO2 97%, not currently breastfeeding.  Physical Exam:   General: patient awake, alert and cooperative    Assessment & Plan     Diagnosis:  Expand All Collapse All        Chief Complaint   Patient presents with    Abdominal Pain         HPI     Magdalena Carreon is a  39 y.o. female here to establish care as a new patient for complaints of abdominal pain.     This patient will also follow with Dr. Abarca.     Past medical history of lap lluvia, seizure disorder, migraines along with alcohol abuse.     She was in the emergency room in March for complaints of abdominal pain -epigastric to left upper quadrant.  CT abdomen pelvis with contrast performed with normal-appearing liver, no biliary dilation, normal pancreas, no evidence of colitis - essentially unremarkable.  Labs at that time with normal hemoglobin, LFTs and lipase.  She was diagnosed with gastritis and discharged on omeprazole and Carafate subsequently referred to our service for evaluation.     She then required hospital admission earlier this month after she presented with nausea, vomiting, abdominal rash and chills in the setting of a recent treatment with Bactrim for abdominal wall cellulitis. She was seen by infectious disease and neurology.  Bactrim was discontinued and she was started on broad-spectrum antibiotics.  Infectious workup remain negative. Symptoms improved and she was able to be discharged.     On visit today patient reports left-sided abdominal pain that comes and goes for roughly 1 year.  Pain described as a burning aching sensation.  She can have episodes of vomiting that seem to occur in the morning once or twice a month.  Pain does not always correlate with eating.  She gets occasional heartburn for which " she takes over-the-counter Mylanta.  Denies dysphagia, odynophagia, poor appetite or weight loss.  She occasionally takes ibuprofen for headaches.  She has been drinking heavily for the last 12 years but has been reducing alcohol use significantly over the last 6 months.  She is now having up to 3 alcoholic beverages every other day.  This is down from a pint of alcohol a day.  She has been prescribed Flexeril for suspected muscle spasms with minimal improvement.     She reports daily bowel movements however can frequently experience incomplete evacuation.  Left-sided abdominal pain can often radiate to the left lower quadrant.  She reports episodic rectal bleeding up until about 1 year ago.  No family history of colon cancer.  She has never had an endoscopic evaluation in her lifetime.     Medical History        Past Medical History:   Diagnosis Date    Abnormal Pap smear of cervix      Migraine      Seizure disorder              Surgical History         Past Surgical History:   Procedure Laterality Date    CEREBRAL ANEURYSM REPAIR        CHOLECYSTECTOMY   2020            Scheduled Meds:   Scheduled Medication           Continuous Infusions:   Infusion Medications   No current facility-administered medications for this visit.         PRN Meds:   PRN Medication           Allergies        Allergies   Allergen Reactions    Prednisone Hives    Bactrim [Sulfamethoxazole-Trimethoprim] Rash            Social History   Social History            Socioeconomic History    Marital status: Single   Tobacco Use    Smoking status: Every Day       Current packs/day: 1.00       Average packs/day: 1 pack/day for 25.1 years (25.1 ttl pk-yrs)       Types: Cigarettes       Start date: 6/5/1999   Vaping Use    Vaping status: Never Used   Substance and Sexual Activity    Alcohol use: Yes       Alcohol/week: 10.0 standard drinks of alcohol       Types: 10 Shots of liquor per week    Drug use: Yes       Types: Marijuana    Sexual activity:  Yes                  Family History   Problem Relation Age of Onset    Colon polyps Mother      Colon polyps Father      Melanoma Father      Diabetes Father      Diabetes Maternal Grandmother      Pancreatic cancer Paternal Grandmother      Diabetes Paternal Grandmother           Review of Systems   HENT:  Negative for trouble swallowing.    Gastrointestinal:  Positive for abdominal pain, anal bleeding, nausea and vomiting.             Vitals:     06/25/24 1055   BP: 117/81   Pulse: 94   Temp: 97.8 °F (36.6 °C)         Physical Exam  Constitutional:       Appearance: She is well-developed.   Abdominal:      General: Bowel sounds are normal. There is no distension.      Palpations: Abdomen is soft. There is no mass.      Tenderness: There is no abdominal tenderness. There is no guarding.      Hernia: No hernia is present.   Skin:     General: Skin is warm and dry.      Capillary Refill: Capillary refill takes less than 2 seconds.   Neurological:      Mental Status: She is alert and oriented to person, place, and time.   Psychiatric:         Behavior: Behavior normal.      Assessment     Diagnoses and all orders for this visit:     1. Left sided abdominal pain (Primary)  -     Case Request; Standing  -     Verify Bowel Prep Was Successful; Standing  -     Give Tap Water Enema If Bowel Prep Insufficient; Standing  -     Case Request  -     CBC (No Diff)  -     Comprehensive Metabolic Panel  -     Celiac Comprehensive Panel     2. Nausea and vomiting, unspecified vomiting type  -     Case Request; Standing  -     Verify Bowel Prep Was Successful; Standing  -     Give Tap Water Enema If Bowel Prep Insufficient; Standing  -     Case Request  -     CBC (No Diff)  -     Comprehensive Metabolic Panel  -     Celiac Comprehensive Panel     3. Heartburn  -     Case Request; Standing  -     Verify Bowel Prep Was Successful; Standing  -     Give Tap Water Enema If Bowel Prep Insufficient; Standing  -     Case Request  -     CBC  (No Diff)  -     Comprehensive Metabolic Panel  -     Celiac Comprehensive Panel     4. Rectal bleeding  -     Case Request; Standing  -     Verify Bowel Prep Was Successful; Standing  -     Give Tap Water Enema If Bowel Prep Insufficient; Standing  -     Case Request  -     CBC (No Diff)             Anticipated Surgical Procedure:  Colonoscopy    The risks, benefits, and alternatives of this procedure have been discussed with the patient or the responsible party- the patient understands and agrees to proceed.

## 2024-09-12 NOTE — ANESTHESIA POSTPROCEDURE EVALUATION
"Patient: Magdalena Carreon    Procedure Summary       Date: 09/12/24 Room / Location: [facility]    Anesthesia Start: 1039 Anesthesia Stop: 1059    Procedures:       ESOPHAGOGASTRODUODENOSCOPY AND COLONOSCOPY (Esophagus)      COLONOSCOPY Diagnosis:       Left sided abdominal pain      Nausea and vomiting, unspecified vomiting type      Heartburn      Rectal bleeding      (Left sided abdominal pain [R10.9])      (Nausea and vomiting, unspecified vomiting type [R11.2])      (Heartburn [R12])      (Rectal bleeding [K62.5])    Surgeons: Brian Abarca MD Provider: Jreri Navarro MD    Anesthesia Type: MAC ASA Status: 3            Anesthesia Type: MAC    Vitals  Vitals Value Taken Time   /79 09/12/24 1110   Temp 36.8 °C (98.2 °F) 09/12/24 1100   Pulse 74 09/12/24 1110   Resp 18 09/12/24 1110   SpO2 96 % 09/12/24 1110           Post Anesthesia Care and Evaluation    Patient location during evaluation: bedside  Patient participation: complete - patient participated  Level of consciousness: awake  Pain management: adequate    Airway patency: patent  Anesthetic complications: No anesthetic complications    Cardiovascular status: acceptable  Respiratory status: acceptable  Hydration status: acceptable    Comments: /79   Pulse 74   Temp 36.8 °C (98.2 °F) (Temporal)   Resp 18   Ht 157.5 cm (62\")   Wt 117 kg (257 lb)   LMP 09/06/2024   SpO2 96%   BMI 47.01 kg/m²     "

## 2024-09-13 LAB
CYTO UR: NORMAL
LAB AP CASE REPORT: NORMAL
PATH REPORT.FINAL DX SPEC: NORMAL
PATH REPORT.GROSS SPEC: NORMAL

## 2024-09-18 ENCOUNTER — TELEPHONE (OUTPATIENT)
Dept: GASTROENTEROLOGY | Facility: CLINIC | Age: 39
End: 2024-09-18
Payer: COMMERCIAL

## 2024-10-14 ENCOUNTER — HOSPITAL ENCOUNTER (EMERGENCY)
Facility: HOSPITAL | Age: 39
Discharge: HOME | End: 2024-10-15
Attending: EMERGENCY MEDICINE | Admitting: EMERGENCY MEDICINE
Payer: COMMERCIAL

## 2024-10-14 DIAGNOSIS — R51.9 ACUTE NONINTRACTABLE HEADACHE, UNSPECIFIED HEADACHE TYPE: Primary | ICD-10-CM

## 2024-10-14 PROCEDURE — 25010000002 KETOROLAC TROMETHAMINE PER 15 MG: Performed by: PHYSICIAN ASSISTANT

## 2024-10-14 PROCEDURE — 25010000002 DIPHENHYDRAMINE PER 50 MG: Performed by: PHYSICIAN ASSISTANT

## 2024-10-14 PROCEDURE — 96375 TX/PRO/DX INJ NEW DRUG ADDON: CPT

## 2024-10-14 PROCEDURE — 25010000002 MAGNESIUM SULFATE 2 GM/50ML SOLUTION: Performed by: PHYSICIAN ASSISTANT

## 2024-10-14 PROCEDURE — 99282 EMERGENCY DEPT VISIT SF MDM: CPT

## 2024-10-14 PROCEDURE — 96365 THER/PROPH/DIAG IV INF INIT: CPT

## 2024-10-14 PROCEDURE — 25010000002 PROCHLORPERAZINE 10 MG/2ML SOLUTION: Performed by: PHYSICIAN ASSISTANT

## 2024-10-14 RX ORDER — MAGNESIUM SULFATE HEPTAHYDRATE 40 MG/ML
2 INJECTION, SOLUTION INTRAVENOUS ONCE
Status: COMPLETED | OUTPATIENT
Start: 2024-10-14 | End: 2024-10-15

## 2024-10-14 RX ORDER — PROCHLORPERAZINE EDISYLATE 5 MG/ML
10 INJECTION INTRAMUSCULAR; INTRAVENOUS ONCE
Status: COMPLETED | OUTPATIENT
Start: 2024-10-14 | End: 2024-10-14

## 2024-10-14 RX ORDER — KETOROLAC TROMETHAMINE 15 MG/ML
15 INJECTION, SOLUTION INTRAMUSCULAR; INTRAVENOUS ONCE
Status: COMPLETED | OUTPATIENT
Start: 2024-10-14 | End: 2024-10-14

## 2024-10-14 RX ORDER — DIPHENHYDRAMINE HYDROCHLORIDE 50 MG/ML
25 INJECTION INTRAMUSCULAR; INTRAVENOUS ONCE
Status: COMPLETED | OUTPATIENT
Start: 2024-10-14 | End: 2024-10-14

## 2024-10-14 RX ADMIN — KETOROLAC TROMETHAMINE 15 MG: 15 INJECTION, SOLUTION INTRAMUSCULAR; INTRAVENOUS at 23:55

## 2024-10-14 RX ADMIN — DIPHENHYDRAMINE HYDROCHLORIDE 25 MG: 50 INJECTION, SOLUTION INTRAMUSCULAR; INTRAVENOUS at 23:55

## 2024-10-14 RX ADMIN — MAGNESIUM SULFATE HEPTAHYDRATE 2 G: 40 INJECTION, SOLUTION INTRAVENOUS at 23:55

## 2024-10-14 RX ADMIN — PROCHLORPERAZINE EDISYLATE 10 MG: 5 INJECTION INTRAMUSCULAR; INTRAVENOUS at 23:55

## 2024-10-14 NOTE — Clinical Note
[facility] EMERGENCY DEPARTMENT  [address] [facility] [address]  Phone: [phone]    Magdalena Carreon was seen and treated in our emergency department on 10/14/2024.  She may return to work on 10/16/2024.         Thank you for choosing [facility].    Nataliia Bauman RN

## 2024-10-15 VITALS
HEART RATE: 66 BPM | DIASTOLIC BLOOD PRESSURE: 78 MMHG | HEIGHT: 62 IN | WEIGHT: 260 LBS | OXYGEN SATURATION: 96 % | BODY MASS INDEX: 47.84 KG/M2 | RESPIRATION RATE: 16 BRPM | TEMPERATURE: 97 F | SYSTOLIC BLOOD PRESSURE: 133 MMHG

## 2024-10-15 NOTE — ED PROVIDER NOTES
MD ATTESTATION NOTE    SHARED VISIT: This visit was performed by BOTH a physician and an APC. The substantive portion of the medical decision making was performed by this attesting physician who made or approved the management plan and takes responsibility for patient management. All studies documented in the APC note (if performed) were independently interpreted by me.    The ANAYELI and I have discussed this patient's history, physical exam, MDM, and treatment plan.  I have reviewed the documentation and personally had a face to face interaction with the patient. The attached note describes my personal findings.      Magdalena Carreon is a 39 y.o. female who presents to the ED c/o acute headache since about 130.  Is like prior migraines.  States her migraines that not been helping.  Describes a headache that is located in the back of her head.  Is not the worst pain she is ever had.  She has been gradually getting better over the course of the evening.  No fever or chills no tingling weakness or numbness.    On exam:  GENERAL: not distressed  HENT: nares patent  EYES: no scleral icterus  CV: regular rhythm, regular rate  RESPIRATORY: normal effort  ABDOMEN: soft  MUSCULOSKELETAL: no deformity  NEURO: alert, moves all extremities, follows commands  SKIN: warm, dry    Labs  No results found for this or any previous visit (from the past 24 hours).    Radiology  No Radiology Exams Resulted Within Past 24 Hours    Medications given in the ED:  Medications   ketorolac (TORADOL) injection 15 mg (15 mg Intravenous Given 10/14/24 2355)   prochlorperazine (COMPAZINE) injection 10 mg (10 mg Intravenous Given 10/14/24 2355)   diphenhydrAMINE (BENADRYL) injection 25 mg (25 mg Intravenous Given 10/14/24 2355)   magnesium sulfate 2g/50 mL (PREMIX) infusion (0 g Intravenous Stopped 10/15/24 0135)       Orders placed during this visit:  No orders of the defined types were placed in this encounter.      Medical Decision Making:  ED Course  as of 10/15/24 0453   Mon Oct 14, 2024   4252 I discussed the case with Dr. Zhang and he agrees to evaluate the patient at the bedside.    [CC]   Tue Oct 15, 2024   0203 I rechecked the patient.  She says she is feeling much better.  I discussed the patient's diagnosis, and plan for discharge.  A repeat exam reveals no new worrisome changes from my initial exam findings.  The patient understands that the fact that they are being discharged does not denote that nothing is abnormal, it indicates that no clinical emergency is present and that they must follow-up as directed in order to properly maintain their health.  Follow-up instructions (specifically listed below) and return to ER precautions were given at this time.  I specifically instructed the patient to follow-up with their PCP.  The patient understands and agrees with the plan, and is ready for discharge.  All questions answered. [CC]      ED Course User Index  [CC] aSra Guevara, MINI       Differential diagnosis:  My differential diagnosis for headache includes but is not limited to:  Migraine, cluster, ischemic stroke, subarachnoid hemorrhage, intracranial hemorrhage, vascular malformation, cerebral aneurysm, vascular dissection, vasculitis, temporal arteritis, malignant hypertension, pheochromocytoma, cerebral venous thrombosis, preeclampsia; bacterial meningitis, viral meningitis, fungal meningitis, encephalitis, brain abscess, pleural empyema, sinusitis, dental infection, influenza, viral syndrome; carbon monoxide exposure, analgesic abuse, hypoglycemia; trigeminal neuralgia, postherpetic neuralgia, occipital neuralgia; subdural hematoma, concussion, musculoskeletal tension, cervical osteoarthritis; glaucoma, TMJ disease, pseudotumor cerebri, post LP headache, intracranial neoplasm, sleep apnea      Diagnosis  Final diagnoses:   Acute nonintractable headache, unspecified headache type          Juan Zhang MD  10/15/24 0453

## 2024-10-15 NOTE — ED PROVIDER NOTES
EMERGENCY DEPARTMENT ENCOUNTER  Room Number:  07/07  PCP: Sonja Broussard MD  Independent Historians: Patient      HPI:  Chief Complaint: had concerns including Headache.     A complete HPI/ROS/PMH/PSH/SH/FH are unobtainable due to: None    Chronic or social conditions impacting patient care (Social Determinants of Health): None      Context: Magdalena Carreon is a 39 y.o. female with a medical history of migraines and seizure disorder presents emergency department complaining of a posterior headache that is been ongoing all day today.  She does have a history of migraines and says she took her rizatriptan earlier today and had no relief.  She says she also tried [place] balm, taking a hot shower, smoking marijuana, and taking a few shots of bourbon to help with the headache but has had no improvement.  She reports photophobia.  She denies dizziness, focal weakness, aphasia, or ataxia.  She denies falls or head injuries.  She denies fever or chills.      Review of prior external notes (non-ED) -and- Review of prior external test results outside of this encounter:   I reviewed labs from 6/25/2024, creatinine 0.66, hemoglobin 14.1      PAST MEDICAL HISTORY  Active Ambulatory Problems     Diagnosis Date Noted    Cellulitis 06/05/2024    Migraine 06/05/2024    Seizure disorder 06/05/2024    Allergy to sulfa drugs 06/05/2024    Dermatitis, unspecified, on abdominal wall 06/05/2024    Left sided abdominal pain 06/25/2024    Nausea and vomiting 06/25/2024    Heartburn 06/25/2024    Rectal bleeding 06/25/2024     Resolved Ambulatory Problems     Diagnosis Date Noted    Sepsis 06/05/2024    Acute lactic acidosis 06/05/2024    Drug induced fever 06/05/2024     Past Medical History:   Diagnosis Date    Abdominal pain     Abnormal Pap smear of cervix     Bronchitis     Cluster headache     GERD (gastroesophageal reflux disease)     Headache, tension-type     Hemorrhoids     Lower abdominal pain, unspecified          PAST  SURGICAL HISTORY  Past Surgical History:   Procedure Laterality Date    CEREBRAL ANEURYSM REPAIR      CHOLECYSTECTOMY  2020    CLOSED REDUCTION WRIST FRACTURE Left     COLONOSCOPY N/A 9/12/2024    Procedure: COLONOSCOPY;  Surgeon: Brian Abarca MD;  [place] [facility] [place]  Service: Gastroenterology;  Laterality: N/A;  polyp, hemorrhoids    D & C WITH SUCTION      ENDOSCOPY N/A 9/12/2024    Procedure: ESOPHAGOGASTRODUODENOSCOPY AND COLONOSCOPY;  Surgeon: Brian Abarca MD;  [place] [facility] [place] [facility] [place]  Service: Gastroenterology;  Laterality: N/A;    LAPAROSCOPIC TUBAL LIGATION           FAMILY HISTORY  Family History   Problem Relation Age of Onset    Colon polyps Mother     Migraines Mother     Stroke Mother     Colon polyps Father     Melanoma Father     Diabetes Father     Diabetes Maternal Grandmother     Pancreatic cancer Paternal Grandmother     Diabetes Paternal Grandmother          SOCIAL HISTORY  Social History     Socioeconomic History    Marital status: Single   Tobacco Use    Smoking status: Every Day     Current packs/day: 1.00     Average packs/day: 1 pack/day for 25.4 years (25.4 ttl pk-yrs)     Types: Cigarettes     Start date: 6/5/1999   Vaping Use    Vaping status: Never Used   Substance and Sexual Activity    Alcohol use: Yes     Alcohol/week: 10.0 standard drinks of alcohol     Types: 10 Shots of liquor per week     Comment: 2 to 3 per day    Drug use: Yes     Types: Marijuana     Comment: Smoking marijuana shouldn't fall under substance abuse.    Sexual activity: Yes     Partners: Male     Birth control/protection: Tubal ligation         ALLERGIES  Prednisone and Bactrim [sulfamethoxazole-trimethoprim]      REVIEW OF SYSTEMS  Included in HPI  All systems reviewed and negative except for those discussed in HPI.      PHYSICAL EXAM    I have reviewed the triage vital signs and nursing notes.    ED Triage Vitals   Temp Heart Rate Resp BP SpO2   10/14/24 2128 10/14/24 2128 10/14/24 2128 10/14/24  2133 10/14/24 2128   97 °F (36.1 °C) 91 16 134/96 99 %      Temp src Heart Rate Source Patient Position BP Location FiO2 (%)   10/14/24 2128 10/14/24 2128 10/14/24 2133 10/14/24 2133 --   Tympanic Monitor Sitting Right arm        Physical Exam  Constitutional:       Appearance: She is ill-appearing. She is not toxic-appearing.   HENT:      Head: Normocephalic and atraumatic.      Nose: Nose normal.      Mouth/Throat:      Mouth: Mucous membranes are moist.   Eyes:      Extraocular Movements: Extraocular movements intact.      Pupils: Pupils are equal, round, and reactive to light.      Comments: Photophobia   Neck:      Comments: No meningismus  Cardiovascular:      Rate and Rhythm: Normal rate and regular rhythm.      Pulses: Normal pulses.      Heart sounds: Normal heart sounds.   Pulmonary:      Effort: Pulmonary effort is normal. No respiratory distress.      Breath sounds: Normal breath sounds.   Abdominal:      General: Abdomen is flat. There is no distension.      Palpations: Abdomen is soft.      Tenderness: There is no abdominal tenderness.   Musculoskeletal:         General: Normal range of motion.      Cervical back: Normal range of motion and neck supple.   Skin:     General: Skin is warm and dry.      Capillary Refill: Capillary refill takes less than 2 seconds.   Neurological:      General: No focal deficit present.      Mental Status: She is alert and oriented to person, place, and time.   Psychiatric:         Mood and Affect: Mood normal.         Behavior: Behavior normal.         MEDICATIONS GIVEN IN ER  Medications   ketorolac (TORADOL) injection 15 mg (15 mg Intravenous Given 10/14/24 2355)   prochlorperazine (COMPAZINE) injection 10 mg (10 mg Intravenous Given 10/14/24 2355)   diphenhydrAMINE (BENADRYL) injection 25 mg (25 mg Intravenous Given 10/14/24 2355)   magnesium sulfate 2g/50 mL (PREMIX) infusion (0 g Intravenous Stopped 10/15/24 0135)           OUTPATIENT MEDICATION MANAGEMENT:  No  current Epic-ordered facility-administered medications on file.     Current Outpatient Medications Ordered in Epic   Medication Sig Dispense Refill    baclofen (LIORESAL) 10 MG tablet Has new Rx that she has not started as of June 2024      ibuprofen (ADVIL,MOTRIN) 200 MG tablet Take 2 tablets by mouth Every 6 (Six) Hours As Needed for Mild Pain.      levETIRAcetam (KEPPRA) 1000 MG tablet Take 1 tablet by mouth 2 (Two) Times a Day. 60 tablet 2    pantoprazole (PROTONIX) 40 MG EC tablet Take 1 tablet by mouth Daily. 90 tablet 3    rizatriptan (MAXALT) 5 MG tablet Take 1 tablet by mouth 1 (One) Time As Needed for Migraine. May repeat in 2 hours if needed 12 tablet 2           PROGRESS, DATA ANALYSIS, CONSULTS, AND MEDICAL DECISION MAKING  ORDERS PLACED DURING THIS VISIT:  No orders of the defined types were placed in this encounter.      All labs have been independently interpreted by me.  All radiology studies have been reviewed by me. All EKG's have been independently viewed and interpreted by me.  Discussion below represents my analysis of pertinent findings related to patient's condition, differential diagnosis, treatment plan and final disposition.    Differential diagnosis includes but is not limited to:   My differential diagnosis for headache includes but is not limited to:  Migraine, cluster, ischemic stroke, subarachnoid hemorrhage, intracranial hemorrhage, vascular malformation, cerebral aneurysm, vascular dissection, vasculitis, temporal arteritis, malignant hypertension, pheochromocytoma, cerebral venous thrombosis, preeclampsia; bacterial meningitis, viral meningitis, fungal meningitis, encephalitis, brain abscess, pleural empyema, sinusitis, dental infection, influenza, viral syndrome; carbon monoxide exposure, analgesic abuse, hypoglycemia; trigeminal neuralgia, postherpetic neuralgia, occipital neuralgia; subdural hematoma, concussion, musculoskeletal tension, cervical osteoarthritis; glaucoma, TMJ  disease, pseudotumor cerebri, post LP headache, intracranial neoplasm, sleep apnea      ED Course:  ED Course as of 10/15/24 0348   Mon Oct 14, 2024   2335 I discussed the case with Dr. Zhang and he agrees to evaluate the patient at the bedside.    [CC]   Tue Oct 15, 2024   0203 I rechecked the patient.  She says she is feeling much better.  I discussed the patient's diagnosis, and plan for discharge.  A repeat exam reveals no new worrisome changes from my initial exam findings.  The patient understands that the fact that they are being discharged does not denote that nothing is abnormal, it indicates that no clinical emergency is present and that they must follow-up as directed in order to properly maintain their health.  Follow-up instructions (specifically listed below) and return to ER precautions were given at this time.  I specifically instructed the patient to follow-up with their PCP.  The patient understands and agrees with the plan, and is ready for discharge.  All questions answered. [CC]      ED Course User Index  [CC] Sara Guevara PA-C           AS OF 03:48 EDT VITALS:    BP - 133/78  HR - 66  TEMP - 97 °F (36.1 °C) (Tympanic)  O2 SATS - 96%        MDM:  Patient is a 39-year-old female who presents the emergency department today complaining of a posterior headache.  On arrival here in the emergency department vitals are reassuring, she is afebrile.  On my exam the patient appears to feel unwell but nontoxic.  She has a nonfocal neurologic exam.  She has no red flag symptoms concerning for intracranial abnormality.  Patient was treated with migraine cocktail with complete resolution of headache.  I did consider CT of the head but given that her symptoms resolved I do not believe it is indicated today as this presentation is consistent with simple migraine headache.  Educated patient on staying well-hydrated and using her migraine medication at home.  Patient is appropriate for discharge with  outpatient follow-up.        DIAGNOSIS  Final diagnoses:   Acute nonintractable headache, unspecified headache type         DISPOSITION  ED Disposition       ED Disposition   Discharge    Condition   Stable    Comment   --                    Please note that portions of this document were completed with a voice recognition program.    Note Disclaimer: At [facility], we believe that sharing information builds trust and better relationships. You are receiving this note because you recently visited [facility]. It is possible you will see health information before a provider has talked with you about it. This kind of information can be easy to misunderstand. To help you fully understand what it means for your health, we urge you to discuss this note with your provider.     Sara Guevara PA-C  10/15/24 0342

## 2024-10-15 NOTE — ED TRIAGE NOTES
Pt arrived ambulatory to ED via POV. Pt reports a migraine since 1330 today. The pt reports she has a hx of migraines, pain is 9/10.

## 2024-11-22 ENCOUNTER — OFFICE VISIT (OUTPATIENT)
Dept: NEUROLOGY | Facility: CLINIC | Age: 39
End: 2024-11-22
Payer: COMMERCIAL

## 2024-11-22 VITALS
BODY MASS INDEX: 48.76 KG/M2 | WEIGHT: 265 LBS | HEIGHT: 62 IN | DIASTOLIC BLOOD PRESSURE: 72 MMHG | HEART RATE: 87 BPM | SYSTOLIC BLOOD PRESSURE: 126 MMHG | OXYGEN SATURATION: 97 %

## 2024-11-22 DIAGNOSIS — G40.909 SEIZURE DISORDER: Primary | ICD-10-CM

## 2024-11-22 DIAGNOSIS — G43.009 MIGRAINE WITHOUT AURA AND WITHOUT STATUS MIGRAINOSUS, NOT INTRACTABLE: ICD-10-CM

## 2024-11-22 PROCEDURE — 99214 OFFICE O/P EST MOD 30 MIN: CPT | Performed by: PSYCHIATRY & NEUROLOGY

## 2024-11-22 RX ORDER — CETIRIZINE HYDROCHLORIDE 10 MG/1
1 TABLET ORAL EVERY 12 HOURS SCHEDULED
COMMUNITY
Start: 2024-10-29

## 2024-11-22 RX ORDER — LEVETIRACETAM 1000 MG/1
1000 TABLET ORAL 2 TIMES DAILY
Qty: 60 TABLET | Refills: 5 | Status: SHIPPED | OUTPATIENT
Start: 2024-11-22

## 2024-11-22 RX ORDER — RIZATRIPTAN BENZOATE 10 MG/1
10 TABLET ORAL ONCE AS NEEDED
Qty: 12 TABLET | Refills: 5 | Status: SHIPPED | OUTPATIENT
Start: 2024-11-22

## 2024-11-22 NOTE — ASSESSMENT & PLAN NOTE
She also has migraines and headaches since childhood.  Her headaches are all over her head.  She has sharp, throbbing and dull headaches which she rates as 10/10 on pain scale 1-10 when most severe with associated light and sound sensitivity along with nausea and rare vomiting.  She is currently getting 1-3 severe migraines per month which responds to the rizatriptan and has 1 or less headache days per week.  She has tried gabapentin which was not helpful.  There is family history of migraines in her mother.  She is not sure about triggers for her migraines.  She had eye examination done without evidence of papilledema in the hospital.  Her headaches responded to migraine medications in the hospital.  She has had tubal ligation and is not able to get pregnant.  I tried her on topiramate which she could not tolerate due to getting random rashes after starting the medicine so she stopped this medicine.  She is using the rizatriptan as needed which does help when she gets the migraines but sometimes she has to take 15 mg to help in 24 hours.  I will increase her dose of rizatriptan to 10 mg for further assistance with acute treatment.

## 2024-11-22 NOTE — PATIENT INSTRUCTIONS
Migraine Headache  A migraine headache is an intense, throbbing pain on one side or both sides of the head. Migraine headaches may also cause other symptoms, such as nausea, vomiting, and sensitivity to light and noise. A migraine headache can last from 4 hours to 3 days. Talk with your doctor about what things may bring on (trigger) your migraine headaches.  What are the causes?  The exact cause of this condition is not known. However, a migraine may be caused when nerves in the brain become irritated and release chemicals that cause inflammation of blood vessels. This inflammation causes pain. This condition may be triggered or caused by:  Drinking alcohol.  Smoking.  Taking medicines, such as:  Medicine used to treat chest pain (nitroglycerin).  Birth control pills.  Estrogen.  Certain blood pressure medicines.  Eating or drinking products that contain nitrates, glutamate, aspartame, or tyramine. Aged cheeses, chocolate, or caffeine may also be triggers.  Doing physical activity.  Other things that may trigger a migraine headache include:  Menstruation.  Pregnancy.  Hunger.  Stress.  Lack of sleep or too much sleep.  Weather changes.  Fatigue.  What increases the risk?  The following factors may make you more likely to experience migraine headaches:  Being a certain age. This condition is more common in people who are 25-55 years old.  Being female.  Having a family history of migraine headaches.  Being .  Having a mental health condition, such as depression or anxiety.  Being obese.  What are the signs or symptoms?  The main symptom of this condition is pulsating or throbbing pain. This pain may:  Happen in any area of the head, such as on one side or both sides.  Interfere with daily activities.  Get worse with physical activity.  Get worse with exposure to bright lights or loud noises.  Other symptoms may include:  Nausea.  Vomiting.  Dizziness.  General sensitivity to bright lights, loud noises, or  smells.  Before you get a migraine headache, you may get warning signs (an aura). An aura may include:  Seeing flashing lights or having blind spots.  Seeing bright spots, halos, or zigzag lines.  Having tunnel vision or blurred vision.  Having numbness or a tingling feeling.  Having trouble talking.  Having muscle weakness.  Some people have symptoms after a migraine headache (postdromal phase), such as:  Feeling tired.  Difficulty concentrating.  How is this diagnosed?  A migraine headache can be diagnosed based on:  Your symptoms.  A physical exam.  Tests, such as:  CT scan or an MRI of the head. These imaging tests can help rule out other causes of headaches.  Taking fluid from the spine (lumbar puncture) and analyzing it (cerebrospinal fluid analysis, or CSF analysis).  How is this treated?  This condition may be treated with medicines that:  Relieve pain.  Relieve nausea.  Prevent migraine headaches.  Treatment for this condition may also include:  Acupuncture.  Lifestyle changes like avoiding foods that trigger migraine headaches.  Biofeedback.  Cognitive behavioral therapy.  Follow these instructions at home:  Medicines  Take over-the-counter and prescription medicines only as told by your health care provider.  Ask your health care provider if the medicine prescribed to you:  Requires you to avoid driving or using heavy machinery.  Can cause constipation. You may need to take these actions to prevent or treat constipation:  Drink enough fluid to keep your urine pale yellow.  Take over-the-counter or prescription medicines.  Eat foods that are high in fiber, such as beans, whole grains, and fresh fruits and vegetables.  Limit foods that are high in fat and processed sugars, such as fried or sweet foods.  Lifestyle  Do not drink alcohol.  Do not use any products that contain nicotine or tobacco, such as cigarettes, e-cigarettes, and chewing tobacco. If you need help quitting, ask your health care  provider.  Get at least 8 hours of sleep every night.  Find ways to manage stress, such as meditation, deep breathing, or yoga.  General instructions               Keep a journal to find out what may trigger your migraine headaches. For example, write down:  What you eat and drink.  How much sleep you get.  Any change to your diet or medicines.  If you have a migraine headache:  Avoid things that make your symptoms worse, such as bright lights.  It may help to lie down in a dark, quiet room.  Do not drive or use heavy machinery.  Ask your health care provider what activities are safe for you while you are experiencing symptoms.  Keep all follow-up visits as told by your health care provider. This is important.  Contact a health care provider if:  You develop symptoms that are different or more severe than your usual migraine headache symptoms.  You have more than 15 headache days in one month.  Get help right away if:  Your migraine headache becomes severe.  Your migraine headache lasts longer than 72 hours.  You have a fever.  You have a stiff neck.  You have vision loss.  Your muscles feel weak or like you cannot control them.  You start to lose your balance often.  You have trouble walking.  You faint.  You have a seizure.  Summary  A migraine headache is an intense, throbbing pain on one side or both sides of the head. Migraines may also cause other symptoms, such as nausea, vomiting, and sensitivity to light and noise.  This condition may be treated with medicines and lifestyle changes. You may also need to avoid certain things that trigger a migraine headache.  Keep a journal to find out what may trigger your migraine headaches.  Contact your health care provider if you have more than 15 headache days in a month or you develop symptoms that are different or more severe than your usual migraine headache symptoms.  This information is not intended to replace advice given to you by your health care provider. Make  sure you discuss any questions you have with your health care provider.  Document Revised: 04/10/2020 Document Reviewed: 01/30/2020  Elsevier Patient Education © 2020 [employer]    In general, we recommend using good judgement when you are doing certain activities and to avoid those activities that if you were to have a seizure, you could harm yourself or others. In the state [facility], it is the law that you cannot drive within 90 days of a seizure. We also recommend not standing over open flames, not getting on high ladders or the roof, not swimming or taking baths by yourself (showers are ok) and not operating heavy machinery or power tools.  [facility]  Suzie Zimmerman MD  Neurology clinic  [phone]    With anti-seizure medications, you may initially notice side effects of fatigue, drowsiness, unsteadiness, and dizziness.  Other possible side effects include nausea, abdominal pain, headache, blurry or double vision, slurred speech and mood changes.  Generally, patients will noticed these symptoms when the medication is first started or with higher doses and will go away with time.    It is import to consistently take your medication every day.  Missing just one dose may put you at risk for a breakthrough seizure.  Consider using reminders on your phone or a pill box.    If you develop a rash, please call the neurology clinic immediately or notify another healthcare professional, as this may be potentially life-threatening.  If you are unable to reach a healthcare professional, go to the emergency room immediately for further evaluation.    If you develop thoughts of wanting to hurt yourself or others, please call the neurology clinic immediately to notify another healthcare professional.  If you are unable to reach a healthcare professional, go to the emergency room immediately for further evaluation.    It is the [place] state law that you cannot drive within 90 days of a  seizure.    You should avoid certain activities that if you were to have a seizure, you could harm yourself or others. In general, it is recommended that you avoid operating heavy machinery or power tools, swimming or taking baths by yourself (showers are ok), don't stand over open flames, don't get on high ladders or the roof.  I also recommend to avoid sleeping on your stomach.    For further information on epilepsy and resources available to patients and their families, please visit the [facility] at [email or web address] or call [phone].    **Check out the [facility]'s monthly Art Group Gathering.  They are located at [facility], [address] [place] [address].  Call Johana Alarcon at [phone] or email her at [email or web address] for the dates of future gatherings.**      **If you have having memory problems, consider HOBSCOTCH (Home-Based Self-management and Cognitive Training Changes lives).  It is an 8 week self-management program for adults with epilepsy and memory problems.  The program is free at the [facility].  Contact Gali Diaz at [phone] or [email or web address].**

## 2024-11-22 NOTE — PROGRESS NOTES
"Chief Complaint  Migraine (Stopped taking Topiramate, was breaking out in rashes. Went to ED for bad migraine 10/14/2024 - has had a few bad migraines since last visit - most recent migraine took 3 Rizatriptans and it eventually went away - No new sz )    Subjective          Magdalena Carreon presents to [facility] NEUROLOGY for   HISTORY OF PRESENT ILLNESS:    Magdalena Carreon is a 39 year old right handed woman who returns to neurology clinic for follow up evaluation and treatment of seizures and migraines with side effects to topiramate.  Of note she was evaluated by my colleagues in the hospital for her migraines in 6/2024. She reports a history of seizures starting when she was 24 years old related to history of brain aneurysm she she was 5 years old for which she had surgery in [place].  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  She had another seizure around a year later and does not remember where that happened and that is when she got established with her first neurologist who she can't remember their name.  She was started by that neurologist on levetiracetam 1000 mg BID which was continued by Dr. Bryson her prior neurologist at same dose.  She thinks her most recent seizure was at night time when she was sleeping around 2-3 years ago and does not think she has had any further seizures.  She has always had grandmal seizures.  No known family history of seizures or epilepsy.  She does report history of head trauma in childhood playing \"chicken\" when she fell backwards and hit her head.  She is tolerating the levetiracetam 1000 mg BID well and is able to afford the medicine well.  She has noticed that missing doses of medications can certainly trigger seizures.       She also has migraines and headaches since childhood.  Her headaches are all over her head.  She has sharp, throbbing and dull headaches which " she rates as 10/10 on pain scale 1-10 when most severe with associated light and sound sensitivity along with nausea and rare vomiting.  She is currently getting 1-3 severe migraines per month which responds to the rizatriptan and has 1 or less headache days per week.  She has tried gabapentin which was not helpful.  There is family history of migraines in her mother.  She is not sure about triggers for her migraines.  She had eye examination done without evidence of papilledema in the hospital.  Her headaches responded to migraine medications in the hospital.  She has had tubal ligation and is not able to get pregnant.  I tried her on topiramate which she could not tolerate due to getting random rashes after starting the medicine so she stopped this medicine.  She is using the rizatriptan as needed which does help when she gets the migraines but sometimes she has to take 15 mg to help in 24 hours.       Past Medical History:   Diagnosis Date    Abdominal pain     left upper pain    Abnormal Pap smear of cervix     Bronchitis     Cluster headache     GERD (gastroesophageal reflux disease)     Headache, tension-type     Hemorrhoids     Lower abdominal pain, unspecified     Migraine     Seizure disorder     last seizure 2 years ago        Family History   Problem Relation Age of Onset    Colon polyps Mother     Migraines Mother     Stroke Mother     Colon polyps Father     Melanoma Father     Diabetes Father     Diabetes Maternal Grandmother     Pancreatic cancer Paternal Grandmother     Diabetes Paternal Grandmother         Social History     Socioeconomic History    Marital status: Single   Tobacco Use    Smoking status: Every Day     Current packs/day: 1.00     Average packs/day: 1 pack/day for 25.5 years (25.5 ttl pk-yrs)     Types: Cigarettes     Start date: 6/5/1999   Vaping Use    Vaping status: Never Used   Substance and Sexual Activity    Alcohol use: Yes     Alcohol/week: 10.0 standard drinks of alcohol      "Types: 10 Shots of liquor per week     Comment: 2 to 3 per day    Drug use: Yes     Types: Marijuana     Comment: Smoking marijuana shouldn't fall under substance abuse.    Sexual activity: Yes     Partners: Male     Birth control/protection: Tubal ligation        I have reviewed and confirmed the accuracy of the ROS as documented by the MA/LPN/RN Suzie Zimmerman MD   Review of Systems   Neurological:  Positive for seizures, numbness (Both hands and arms) and headache. Negative for dizziness, tremors, syncope, facial asymmetry, speech difficulty, weakness, light-headedness, memory problem and confusion.   Psychiatric/Behavioral:  Negative for agitation, behavioral problems, decreased concentration, dysphoric mood, hallucinations, self-injury, sleep disturbance, suicidal ideas, negative for hyperactivity, depressed mood and stress. The patient is not nervous/anxious.         Objective   Vital Signs:   /72   Pulse 87   Ht 157.5 cm (62.01\")   Wt 120 kg (265 lb)   SpO2 97%   BMI 48.46 kg/m²       PHYSICAL EXAM:    General   Mental Status - Alert. General Appearance - Well developed, Well groomed, Oriented and Cooperative. Orientation - Oriented X3.       Head and Neck  Head - normocephalic, atraumatic with no lesions or palpable masses.  Neck    Global Assessment - supple.       Eye   Sclera/Conjunctiva - Bilateral - Normal.    ENMT  Mouth and Throat   Oral Cavity/Oropharynx: Oropharynx - the soft palate,uvula and tongue are normal in appearance.    Chest and Lung Exam   Chest - lung clear to auscultation bilaterally.    Cardiovascular   Cardiovascular examination reveals  - normal heart sounds, regular rate and rhythm.    Neurologic   Mental Status: Speech - Normal. Cognitive function - appropriate fund of knowledge. No impairment of attention, Impairment of concentration, impairment of long term memory or impairment of short term memory.  Cranial Nerves:   II Optic: Visual acuity - Left - Normal. Right - " Normal. Visual fields - Normal (to confrontation).  III Oculomotor: Pupillary constriction - Left - Normal. Right - Normal.  VII Facial: - Normal Bilaterally.   IX Glossopharyngeal / X Vagus - Normal.  XI Accessory: Trapezius - Bilateral - Normal. Sternocleidomastoid - Bilateral - Normal.  XII Hypoglossal - Bilateral - Normal.  Eye Movements: - Normal Bilaterally.  Sensory:   Light Touch: Intact - Globally.  Motor:   Bulk and Contour: - Normal.  Tone: - Normal.  Tremor: Not present.  Strength: 5/5 normal muscle strength - All Muscles.   General Assessment of Reflexes: - deep tendon reflexes are normal. Coordination - No Impairment of finger-to-nose or Impairment of rapid alternating movements. Gait - Normal.       Result Review :                 Assessment and Plan    Problem List Items Addressed This Visit       Migraine    Current Assessment & Plan     She also has migraines and headaches since childhood.  Her headaches are all over her head.  She has sharp, throbbing and dull headaches which she rates as 10/10 on pain scale 1-10 when most severe with associated light and sound sensitivity along with nausea and rare vomiting.  She is currently getting 1-3 severe migraines per month which responds to the rizatriptan and has 1 or less headache days per week.  She has tried gabapentin which was not helpful.  There is family history of migraines in her mother.  She is not sure about triggers for her migraines.  She had eye examination done without evidence of papilledema in the hospital.  Her headaches responded to migraine medications in the hospital.  She has had tubal ligation and is not able to get pregnant.  I tried her on topiramate which she could not tolerate due to getting random rashes after starting the medicine so she stopped this medicine.  She is using the rizatriptan as needed which does help when she gets the migraines but sometimes she has to take 15 mg to help in 24 hours.  I will increase her dose  "of rizatriptan to 10 mg for further assistance with acute treatment.           Relevant Medications    levETIRAcetam (KEPPRA) 1000 MG tablet    rizatriptan (MAXALT) 10 MG tablet    Seizure disorder - Primary    Current Assessment & Plan     39 year old right handed woman who returns to neurology clinic for follow up evaluation and treatment of seizures and migraines with side effects to topiramate.  Of note she was evaluated by my colleagues in the hospital for her migraines in 6/2024. She reports a history of seizures starting when she was 24 years old related to history of brain aneurysm she she was 5 years old for which she had surgery in [place].  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  She had another seizure around a year later and does not remember where that happened and that is when she got established with her first neurologist who she can't remember their name.  She was started by that neurologist on levetiracetam 1000 mg BID which was continued by Dr. Bryson her prior neurologist at same dose.  She thinks her most recent seizure was at night time when she was sleeping around 2-3 years ago and does not think she has had any further seizures.  She has always had grandmal seizures.  No known family history of seizures or epilepsy.  She does report history of head trauma in childhood playing \"chicken\" when she fell backwards and hit her head.  She is tolerating the levetiracetam 1000 mg BID well and is able to afford the medicine well.  She has noticed that missing doses of medications can certainly trigger seizures.  Will continue the current dose of levetiracetam which is working well to keep patient seizure free at this time. I advised her not to miss any doses of her seizure medications as seizures can be potentially fatal and discussed SUDEP.  Discussed seizure precautions.           Relevant Medications    " levETIRAcetam (KEPPRA) 1000 MG tablet     Follow Up   Return in about 6 months (around 5/22/2025).  Patient was given instructions and counseling regarding her condition or for health maintenance advice. Please see specific information pulled into the AVS if appropriate.

## 2024-11-22 NOTE — ASSESSMENT & PLAN NOTE
"39 year old right handed woman who returns to neurology clinic for follow up evaluation and treatment of seizures and migraines with side effects to topiramate.  Of note she was evaluated by my colleagues in the hospital for her migraines in 6/2024. She reports a history of seizures starting when she was 24 years old related to history of brain aneurysm she she was 5 years old for which she had surgery in [place].  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  She had another seizure around a year later and does not remember where that happened and that is when she got established with her first neurologist who she can't remember their name.  She was started by that neurologist on levetiracetam 1000 mg BID which was continued by Dr. Bryson her prior neurologist at same dose.  She thinks her most recent seizure was at night time when she was sleeping around 2-3 years ago and does not think she has had any further seizures.  She has always had grandmal seizures.  No known family history of seizures or epilepsy.  She does report history of head trauma in childhood playing \"chicken\" when she fell backwards and hit her head.  She is tolerating the levetiracetam 1000 mg BID well and is able to afford the medicine well.  She has noticed that missing doses of medications can certainly trigger seizures.  Will continue the current dose of levetiracetam which is working well to keep patient seizure free at this time. I advised her not to miss any doses of her seizure medications as seizures can be potentially fatal and discussed SUDEP.  Discussed seizure precautions.    "

## 2025-03-28 ENCOUNTER — TELEPHONE (OUTPATIENT)
Dept: NEUROLOGY | Facility: CLINIC | Age: 40
End: 2025-03-28

## 2025-03-28 NOTE — TELEPHONE ENCOUNTER
The [facility] received a fax that requires your attention. The document has been indexed to the patient’s chart for your review.      Reason for sending: RECEIVED REFILL REQUEST FOR RIZATRIPTAN 10MG    Documents Description: REFILL REQUEST_ [facility]_03/20/25    Name of Sender: [facility]    Date Indexed: 03/28/25    Notes (if needed):

## 2025-05-23 ENCOUNTER — SPECIALTY PHARMACY (OUTPATIENT)
Dept: NEUROLOGY | Facility: CLINIC | Age: 40
End: 2025-05-23
Payer: COMMERCIAL

## 2025-05-23 ENCOUNTER — OFFICE VISIT (OUTPATIENT)
Dept: NEUROLOGY | Facility: CLINIC | Age: 40
End: 2025-05-23
Payer: COMMERCIAL

## 2025-05-23 VITALS
DIASTOLIC BLOOD PRESSURE: 88 MMHG | HEART RATE: 66 BPM | BODY MASS INDEX: 49.32 KG/M2 | OXYGEN SATURATION: 97 % | HEIGHT: 62 IN | SYSTOLIC BLOOD PRESSURE: 126 MMHG | WEIGHT: 268 LBS

## 2025-05-23 DIAGNOSIS — F41.9 ANXIETY AND DEPRESSION: ICD-10-CM

## 2025-05-23 DIAGNOSIS — F32.A ANXIETY AND DEPRESSION: ICD-10-CM

## 2025-05-23 DIAGNOSIS — Z86.79 HISTORY OF ANEURYSM: ICD-10-CM

## 2025-05-23 DIAGNOSIS — G43.009 MIGRAINE WITHOUT AURA AND WITHOUT STATUS MIGRAINOSUS, NOT INTRACTABLE: ICD-10-CM

## 2025-05-23 DIAGNOSIS — G40.909 SEIZURE DISORDER: Primary | ICD-10-CM

## 2025-05-23 RX ORDER — LEVETIRACETAM 1000 MG/1
1000 TABLET ORAL 2 TIMES DAILY
Qty: 60 TABLET | Refills: 2 | Status: SHIPPED | OUTPATIENT
Start: 2025-05-23

## 2025-05-23 RX ORDER — ESCITALOPRAM OXALATE 5 MG/1
5 TABLET ORAL DAILY
Qty: 30 TABLET | Refills: 2 | Status: SHIPPED | OUTPATIENT
Start: 2025-05-23

## 2025-05-23 NOTE — PATIENT INSTRUCTIONS
Migraine Headache  A migraine headache is an intense, throbbing pain on one side or both sides of the head. Migraine headaches may also cause other symptoms, such as nausea, vomiting, and sensitivity to light and noise. A migraine headache can last from 4 hours to 3 days. Talk with your doctor about what things may bring on (trigger) your migraine headaches.  What are the causes?  The exact cause of this condition is not known. However, a migraine may be caused when nerves in the brain become irritated and release chemicals that cause inflammation of blood vessels. This inflammation causes pain. This condition may be triggered or caused by:  Drinking alcohol.  Smoking.  Taking medicines, such as:  Medicine used to treat chest pain (nitroglycerin).  Birth control pills.  Estrogen.  Certain blood pressure medicines.  Eating or drinking products that contain nitrates, glutamate, aspartame, or tyramine. Aged cheeses, chocolate, or caffeine may also be triggers.  Doing physical activity.  Other things that may trigger a migraine headache include:  Menstruation.  Pregnancy.  Hunger.  Stress.  Lack of sleep or too much sleep.  Weather changes.  Fatigue.  What increases the risk?  The following factors may make you more likely to experience migraine headaches:  Being a certain age. This condition is more common in people who are 25-55 years old.  Being female.  Having a family history of migraine headaches.  Being .  Having a mental health condition, such as depression or anxiety.  Being obese.  What are the signs or symptoms?  The main symptom of this condition is pulsating or throbbing pain. This pain may:  Happen in any area of the head, such as on one side or both sides.  Interfere with daily activities.  Get worse with physical activity.  Get worse with exposure to bright lights or loud noises.  Other symptoms may include:  Nausea.  Vomiting.  Dizziness.  General sensitivity to bright lights, loud noises, or  smells.  Before you get a migraine headache, you may get warning signs (an aura). An aura may include:  Seeing flashing lights or having blind spots.  Seeing bright spots, halos, or zigzag lines.  Having tunnel vision or blurred vision.  Having numbness or a tingling feeling.  Having trouble talking.  Having muscle weakness.  Some people have symptoms after a migraine headache (postdromal phase), such as:  Feeling tired.  Difficulty concentrating.  How is this diagnosed?  A migraine headache can be diagnosed based on:  Your symptoms.  A physical exam.  Tests, such as:  CT scan or an MRI of the head. These imaging tests can help rule out other causes of headaches.  Taking fluid from the spine (lumbar puncture) and analyzing it (cerebrospinal fluid analysis, or CSF analysis).  How is this treated?  This condition may be treated with medicines that:  Relieve pain.  Relieve nausea.  Prevent migraine headaches.  Treatment for this condition may also include:  Acupuncture.  Lifestyle changes like avoiding foods that trigger migraine headaches.  Biofeedback.  Cognitive behavioral therapy.  Follow these instructions at home:  Medicines  Take over-the-counter and prescription medicines only as told by your health care provider.  Ask your health care provider if the medicine prescribed to you:  Requires you to avoid driving or using heavy machinery.  Can cause constipation. You may need to take these actions to prevent or treat constipation:  Drink enough fluid to keep your urine pale yellow.  Take over-the-counter or prescription medicines.  Eat foods that are high in fiber, such as beans, whole grains, and fresh fruits and vegetables.  Limit foods that are high in fat and processed sugars, such as fried or sweet foods.  Lifestyle  Do not drink alcohol.  Do not use any products that contain nicotine or tobacco, such as cigarettes, e-cigarettes, and chewing tobacco. If you need help quitting, ask your health care  provider.  Get at least 8 hours of sleep every night.  Find ways to manage stress, such as meditation, deep breathing, or yoga.  General instructions               Keep a journal to find out what may trigger your migraine headaches. For example, write down:  What you eat and drink.  How much sleep you get.  Any change to your diet or medicines.  If you have a migraine headache:  Avoid things that make your symptoms worse, such as bright lights.  It may help to lie down in a dark, quiet room.  Do not drive or use heavy machinery.  Ask your health care provider what activities are safe for you while you are experiencing symptoms.  Keep all follow-up visits as told by your health care provider. This is important.  Contact a health care provider if:  You develop symptoms that are different or more severe than your usual migraine headache symptoms.  You have more than 15 headache days in one month.  Get help right away if:  Your migraine headache becomes severe.  Your migraine headache lasts longer than 72 hours.  You have a fever.  You have a stiff neck.  You have vision loss.  Your muscles feel weak or like you cannot control them.  You start to lose your balance often.  You have trouble walking.  You faint.  You have a seizure.  Summary  A migraine headache is an intense, throbbing pain on one side or both sides of the head. Migraines may also cause other symptoms, such as nausea, vomiting, and sensitivity to light and noise.  This condition may be treated with medicines and lifestyle changes. You may also need to avoid certain things that trigger a migraine headache.  Keep a journal to find out what may trigger your migraine headaches.  Contact your health care provider if you have more than 15 headache days in a month or you develop symptoms that are different or more severe than your usual migraine headache symptoms.  This information is not intended to replace advice given to you by your health care provider. Make  sure you discuss any questions you have with your health care provider.  Document Revised: 04/10/2020 Document Reviewed: 01/30/2020  Elsevier Patient Education © 2020 [employer]    [facility]  Suzie Zimmerman MD  Neurology clinic  [phone]    With anti-seizure medications, you may initially notice side effects of fatigue, drowsiness, unsteadiness, and dizziness.  Other possible side effects include nausea, abdominal pain, headache, blurry or double vision, slurred speech and mood changes.  Generally, patients will noticed these symptoms when the medication is first started or with higher doses and will go away with time.    It is import to consistently take your medication every day.  Missing just one dose may put you at risk for a breakthrough seizure.  Consider using reminders on your phone or a pill box.    If you develop a rash, please call the neurology clinic immediately or notify another healthcare professional, as this may be potentially life-threatening.  If you are unable to reach a healthcare professional, go to the emergency room immediately for further evaluation.    If you develop thoughts of wanting to hurt yourself or others, please call the neurology clinic immediately to notify another healthcare professional.  If you are unable to reach a healthcare professional, go to the emergency room immediately for further evaluation.    It is the [place] state law that you cannot drive within 90 days of a seizure.    You should avoid certain activities that if you were to have a seizure, you could harm yourself or others. In general, it is recommended that you avoid operating heavy machinery or power tools, swimming or taking baths by yourself (showers are ok), don't stand over open flames, don't get on high ladders or the roof.  I also recommend to avoid sleeping on your stomach.    For further information on epilepsy and resources available to patients and their families, please  visit the [facility] at www.efky.org or call [phone].    **Check out the [facility]'s monthly Art Group Gathering.  [facility] [place], [address] [place] [address].  Call Johana Agnes at [phone] or email her at [email or web address] for the dates of future gatherings.**      **If you have having memory problems, consider HOBSCOTCH (Home-Based Self-management and Cognitive Training Changes lives).  It is an 8 week self-management program for adults with epilepsy and memory problems.  The program is free at the [facility].  Contact Gali Diaz at [phone] or [email or web address].**

## 2025-05-23 NOTE — ASSESSMENT & PLAN NOTE
"40 year old right handed woman with seizures (likely structural due to history of aneurysm) and migraines with side effects to topiramate.  Of note she was evaluated by my colleagues in the hospital for her migraines in 6/2024. She reports a history of seizures starting when she was 24 years old related to history of brain aneurysm she she was 5 years old for which she had surgery in [place].  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  She had another seizure around a year later and does not remember where that happened and that is when she got established with her first neurologist who she can't remember their name.  She was started by that neurologist on levetiracetam 1000 mg BID which was continued by Dr. Bryson her prior neurologist at same dose.  She thinks her most recent seizure was at night time when she was sleeping around 3-4 years ago and does not think she has had any further seizures.  She has always had grandmal seizures.  No known family history of seizures or epilepsy.  She does report history of head trauma in childhood playing \"chicken\" when she fell backwards and hit her head.  She is tolerating the levetiracetam 1000 mg BID well and is able to afford the medicine well.  She has missing doses of medications at times but she has set alarm on her phone to help prevent her from missing doses.  I will continue current dose of levetiracetam for patient.  Discussed seizure precautions with patient and advised her to continue current medication as prescribed without missing any doses as seizures can be potentially fatal. They are reporting more spells where she is staring off and zoning out for a second.  Patient thinks it maybe that it maybe taking longer for her brain to process so I will order a prolonged EEG for further evaluation of possible breakthrough seizures.    "

## 2025-05-23 NOTE — PROGRESS NOTES
"Chief Complaint  Seizures (Accompanied by  (Kevan) No new sz) and Migraine (Between 4-10 a month )    Subjective          Magdalena Carreon presents to [facility] for   HISTORY OF PRESENT ILLNESS:    Magdalena Carreon is a 40 year old right handed woman who returns to neurology clinic, with her , Shadi, for follow up evaluation and treatment of seizures and migraines with side effects to topiramate.  Of note she was evaluated by my colleagues in the hospital for her migraines in 6/2024. She reports a history of seizures starting when she was 24 years old related to history of brain aneurysm she she was 5 years old for which she had surgery in [place].  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  She had another seizure around a year later and does not remember where that happened and that is when she got established with her first neurologist who she can't remember their name.  She was started by that neurologist on levetiracetam 1000 mg BID which was continued by Dr. Bryson her prior neurologist at same dose.  She thinks her most recent seizure was at night time when she was sleeping around 3-4 years ago and does not think she has had any further seizures.  She has always had grandmal seizures.  No known family history of seizures or epilepsy.  She does report history of head trauma in childhood playing \"chicken\" when she fell backwards and hit her head.  She is tolerating the levetiracetam 1000 mg BID well and is able to afford the medicine well.  She has missing doses of medications at times but she has set alarm on her phone to help prevent her from missing doses.  They are reporting more spells where she is staring off and zoning out for a second.  Patient thinks it maybe that it maybe taking longer for her brain to process.       She also has migraines and headaches since childhood.  Her " headaches are all over her head.  She has sharp, throbbing and dull headaches which she rates as 10/10 on pain scale 1-10 when most severe with associated light and sound sensitivity along with nausea and rare vomiting.  She is currently getting 4-10 severe migraines per month which responds to the rizatriptan.  She has tried gabapentin which was not helpful.  There is family history of migraines in her mother.  She is not sure about triggers for her migraines.  She had eye examination done without evidence of papilledema in the hospital.  Her headaches responded to migraine medications in the hospital.  She has had tubal ligation and is not able to get pregnant.  I tried her on topiramate which she could not tolerate due to getting random rashes after starting the medicine so she stopped this medicine.  She is using the rizatriptan as needed which does help but she is having more headaches and would like further assistance.  She does sleep well and she does not snore much or wake herself up out of sleep and no increased daytime sleepiness.      Past Medical History:   Diagnosis Date    Abdominal pain     left upper pain    Abnormal Pap smear of cervix     Bronchitis     Cluster headache     GERD (gastroesophageal reflux disease)     Headache, tension-type     Hemorrhoids     Lower abdominal pain, unspecified     Migraine     Seizure disorder     last seizure 2 years ago        Family History   Problem Relation Age of Onset    Colon polyps Mother     Migraines Mother     Stroke Mother     Colon polyps Father     Melanoma Father     Diabetes Father     Diabetes Maternal Grandmother     Pancreatic cancer Paternal Grandmother     Diabetes Paternal Grandmother         Social History     Socioeconomic History    Marital status: Single   Tobacco Use    Smoking status: Every Day     Current packs/day: 1.00     Average packs/day: 1 pack/day for 26.0 years (26.0 ttl pk-yrs)     Types: Cigarettes     Start date: 6/5/1999  "  Vaping Use    Vaping status: Never Used   Substance and Sexual Activity    Alcohol use: Yes     Alcohol/week: 10.0 standard drinks of alcohol     Types: 10 Shots of liquor per week     Comment: 2 to 3 per day    Drug use: Yes     Types: Marijuana     Comment: Smoking marijuana shouldn't fall under substance abuse.    Sexual activity: Yes     Partners: Male     Birth control/protection: Tubal ligation        I have reviewed and confirmed the accuracy of the ROS as documented by the MA/LPN/RN Suzie Zimmerman MD   Review of Systems   Neurological:  Positive for dizziness and headache. Negative for tremors, seizures, syncope, facial asymmetry, speech difficulty, weakness, light-headedness, numbness, memory problem and confusion.   Psychiatric/Behavioral:  Positive for agitation. Negative for behavioral problems, decreased concentration, dysphoric mood, hallucinations, self-injury, sleep disturbance, suicidal ideas, negative for hyperactivity, depressed mood and stress. The patient is not nervous/anxious.         Objective   Vital Signs:   /88   Pulse 66   Ht 157.5 cm (62.01\")   Wt 122 kg (268 lb)   SpO2 97%   BMI 49.01 kg/m²       PHYSICAL EXAM:    General   Mental Status - Alert. General Appearance - Well groomed, Oriented and Cooperative. Orientation - Oriented X3.       Head and Neck  Head - normocephalic, atraumatic with no lesions or palpable masses.  Neck    Global Assessment - supple.       Eye   Sclera/Conjunctiva - Bilateral - Normal.    ENMT  Mouth and Throat   Oral Cavity/Oropharynx: Oropharynx - the soft palate,uvula and tongue are normal in appearance.    Chest and Lung Exam   Chest - lung clear to auscultation bilaterally.    Cardiovascular   Cardiovascular examination reveals  - normal heart sounds, regular rate and rhythm.    Neurologic   Mental Status: Speech - Normal. Cognitive function - appropriate fund of knowledge. No impairment of attention, Impairment of concentration, impairment of " long term memory or impairment of short term memory.  Cranial Nerves:   II Optic: Visual acuity - Left - Normal. Right - Normal. Visual fields - Normal (to confrontation).  III Oculomotor: Pupillary constriction - Left - Normal. Right - Normal.  VII Facial: - Normal Bilaterally.   IX Glossopharyngeal / X Vagus - Normal.  XI Accessory: Trapezius - Bilateral - Normal. Sternocleidomastoid - Bilateral - Normal.  XII Hypoglossal - Bilateral - Normal.  Eye Movements: - Normal Bilaterally.  Sensory:   Light Touch: Intact - Globally.  Motor:   Bulk and Contour: - Normal.  Tone: - Normal.  Tremor: Not present.  Strength: 5/5 normal muscle strength - All Muscles.   General Assessment of Reflexes: - deep tendon reflexes are normal. Coordination - No Impairment of finger-to-nose or Impairment of rapid alternating movements. Gait - Normal.         Result Review :                 Assessment and Plan    Problem List Items Addressed This Visit       Migraine    Current Assessment & Plan   She also has migraines and headaches since childhood.  Her headaches are all over her head.  She has sharp, throbbing and dull headaches which she rates as 10/10 on pain scale 1-10 when most severe with associated light and sound sensitivity along with nausea and rare vomiting.  She is currently getting 4-10 severe migraines per month which responds to the rizatriptan.  She has tried gabapentin which was not helpful.  There is family history of migraines in her mother.  She is not sure about triggers for her migraines.  She had eye examination done without evidence of papilledema in the hospital.  Her headaches responded to migraine medications in the hospital.  She has had tubal ligation and is not able to get pregnant.  I tried her on topiramate which she could not tolerate due to getting random rashes after starting the medicine so she stopped this medicine.  She is using the rizatriptan as needed which does help but she is having more  "headaches and would like further assistance.   I will start her on Nurtec ODT every other day and as needed for her migraines.  No more than 1 Nurtec ODT in 24 hours, she can use the rizatriptan if she needs to for further assistance with migraines as well.           Relevant Medications    escitalopram (LEXAPRO) 5 MG tablet    levETIRAcetam (KEPPRA) 1000 MG tablet    rimegepant sulfate ODT (Nurtec) 75 MG disintegrating tablet    Seizure disorder - Primary    Current Assessment & Plan   40 year old right handed woman with seizures (likely structural due to history of aneurysm) and migraines with side effects to topiramate.  Of note she was evaluated by my colleagues in the hospital for her migraines in 6/2024. She reports a history of seizures starting when she was 24 years old related to history of brain aneurysm she she was 5 years old for which she had surgery in [place].  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  She had another seizure around a year later and does not remember where that happened and that is when she got established with her first neurologist who she can't remember their name.  She was started by that neurologist on levetiracetam 1000 mg BID which was continued by Dr. Bryson her prior neurologist at same dose.  She thinks her most recent seizure was at night time when she was sleeping around 3-4 years ago and does not think she has had any further seizures.  She has always had grandmal seizures.  No known family history of seizures or epilepsy.  She does report history of head trauma in childhood playing \"chicken\" when she fell backwards and hit her head.  She is tolerating the levetiracetam 1000 mg BID well and is able to afford the medicine well.  She has missing doses of medications at times but she has set alarm on her phone to help prevent her from missing doses.  I will continue current dose of " levetiracetam for patient.  Discussed seizure precautions with patient and advised her to continue current medication as prescribed without missing any doses as seizures can be potentially fatal. They are reporting more spells where she is staring off and zoning out for a second.  Patient thinks it maybe that it maybe taking longer for her brain to process so I will order a prolonged EEG for further evaluation of possible breakthrough seizures.           Relevant Medications    levETIRAcetam (KEPPRA) 1000 MG tablet    Other Relevant Orders    EEG Continuous Monitoring With Video    Anxiety and depression    Current Assessment & Plan   This is chronic issue that she has had and previously was seen by therapist in the past but not more recently.  She denies any current SI/HI but does report feeling pretty down in the past.  There is family history of depression.  I will start her on low dose of Lexapro today for further assistance.  I advised her to exercise and try to get out and socialize.  I advised her to be evaluated in the ED with any thoughts of hurting herself or anyone else.           Relevant Medications    escitalopram (LEXAPRO) 5 MG tablet    Other Relevant Orders    Ambulatory Referral to Psychology    Ambulatory Referral to Psychiatry    History of aneurysm    Current Assessment & Plan   Increased headaches, she does smoke, has history of aneurysm and no recent imaging.  Imaging for further evaluation.           Relevant Orders    MRI Angiogram Head Without Contrast     I spent 44 minutes caring for Magdalena on this date of service. This time includes time spent by me in the following activities:preparing for the visit, reviewing tests, obtaining and/or reviewing a separately obtained history, performing a medically appropriate examination and/or evaluation , counseling and educating the patient/family/caregiver, ordering medications, tests, or procedures, documenting information in the medical record, and  care coordination    Follow Up   Return in about 3 months (around 8/23/2025).  Patient was given instructions and counseling regarding her condition or for health maintenance advice. Please see specific information pulled into the AVS if appropriate.

## 2025-05-23 NOTE — ASSESSMENT & PLAN NOTE
This is chronic issue that she has had and previously was seen by therapist in the past but not more recently.  She denies any current SI/HI but does report feeling pretty down in the past.  There is family history of depression.  I will start her on low dose of Lexapro today for further assistance.  I advised her to exercise and try to get out and socialize.  I advised her to be evaluated in the ED with any thoughts of hurting herself or anyone else.

## 2025-05-23 NOTE — ASSESSMENT & PLAN NOTE
She also has migraines and headaches since childhood.  Her headaches are all over her head.  She has sharp, throbbing and dull headaches which she rates as 10/10 on pain scale 1-10 when most severe with associated light and sound sensitivity along with nausea and rare vomiting.  She is currently getting 4-10 severe migraines per month which responds to the rizatriptan.  She has tried gabapentin which was not helpful.  There is family history of migraines in her mother.  She is not sure about triggers for her migraines.  She had eye examination done without evidence of papilledema in the hospital.  Her headaches responded to migraine medications in the hospital.  She has had tubal ligation and is not able to get pregnant.  I tried her on topiramate which she could not tolerate due to getting random rashes after starting the medicine so she stopped this medicine.  She is using the rizatriptan as needed which does help but she is having more headaches and would like further assistance.   I will start her on Nurtec ODT every other day and as needed for her migraines.  No more than 1 Nurtec ODT in 24 hours, she can use the rizatriptan if she needs to for further assistance with migraines as well.

## 2025-05-23 NOTE — ASSESSMENT & PLAN NOTE
Increased headaches, she does smoke, has history of aneurysm and no recent imaging.  Imaging for further evaluation.

## 2025-05-26 ENCOUNTER — SPECIALTY PHARMACY (OUTPATIENT)
Dept: NEUROLOGY | Facility: CLINIC | Age: 40
End: 2025-05-26
Payer: COMMERCIAL

## 2025-05-26 NOTE — PROGRESS NOTES
Specialty Pharmacy Patient Management Program  Refill Outreach     Coral Gayle  5/26/2025  12:16 EDT

## 2025-05-27 ENCOUNTER — SPECIALTY PHARMACY (OUTPATIENT)
Dept: NEUROLOGY | Facility: CLINIC | Age: 40
End: 2025-05-27
Payer: COMMERCIAL

## 2025-05-27 NOTE — PROGRESS NOTES
Specialty Pharmacy Patient Management Program  Refill Outreach     [facility] QOD approved until 5/27/2026      Tara Gayle  5/27/2025  12:34 EDT

## 2025-05-28 ENCOUNTER — TELEPHONE (OUTPATIENT)
Dept: NEUROLOGY | Facility: CLINIC | Age: 40
End: 2025-05-28

## 2025-05-28 ENCOUNTER — SPECIALTY PHARMACY (OUTPATIENT)
Dept: NEUROLOGY | Facility: CLINIC | Age: 40
End: 2025-05-28
Payer: COMMERCIAL

## 2025-05-28 NOTE — PROGRESS NOTES
Specialty Pharmacy Patient Management Program  Neurology Initial and reassessment     Magdalena Carreon is a 40 y.o. female with chronic migraines seen by a Neurology provider and enrolled in the Neurology Patient Management program offered by [facility].  An initial outreach was conducted, including assessment of therapy appropriateness and specialty medication education for rimegepant (Nurtec). The patient was introduced to services offered by [facility], including: regular assessments, refill coordination, curbside pick-up or mail order delivery options, prior authorization maintenance, and financial assistance programs as applicable. The patient was also provided with contact information for the pharmacy team.     Insurance Coverage & Financial Support   Copay Card Added- Nurtec    Relevant Past Medical History and Comorbidities  Relevant medical history and concomitant health conditions were discussed with the patient. The patient's chart has been reviewed for relevant past medical history and comorbid health conditions and updated as necessary.   Past Medical History:   Diagnosis Date    Abdominal pain     left upper pain    Abnormal Pap smear of cervix     Bronchitis     Cluster headache     GERD (gastroesophageal reflux disease)     Headache, tension-type     Hemorrhoids     Lower abdominal pain, unspecified     Migraine     Seizure disorder     last seizure 2 years ago     Social History     Socioeconomic History    Marital status: Single   Tobacco Use    Smoking status: Every Day     Current packs/day: 1.00     Average packs/day: 1 pack/day for 26.0 years (26.0 ttl pk-yrs)     Types: Cigarettes     Start date: 6/5/1999   Vaping Use    Vaping status: Never Used   Substance and Sexual Activity    Alcohol use: Yes     Alcohol/week: 10.0 standard drinks of alcohol     Types: 10 Shots of liquor per week     Comment: 2 to 3 per day    Drug use: Yes      Types: Marijuana     Comment: Smoking marijuana shouldn't fall under substance abuse.    Sexual activity: Yes     Partners: Male     Birth control/protection: Tubal ligation       Problem list reviewed by Lindy Melton RPH on 5/28/2025 at 11:11 AM    Allergies  Known allergies and reactions were discussed with the patient. The patient's chart has been reviewed for  allergy information and updated as necessary.   Prednisone and Bactrim [sulfamethoxazole-trimethoprim]    Allergies reviewed by Lindy Melton RPH on 5/28/2025 at 11:11 AM    Current Medication List  This medication list has been reviewed with the patient and evaluated for any interactions or necessary modifications/recommendations, and updated to include all prescription medications, OTC medications, and supplements the patient is currently taking.  This list reflects what is contained in the patient's profile, which has also been marked as reviewed to communicate to other providers it is the most up to date version of the patient's current medication therapy.     Current Outpatient Medications:     baclofen (LIORESAL) 10 MG tablet, Has new Rx that she has not started as of June 2024 (Patient not taking: Reported on 5/23/2025), Disp: , Rfl:     cetirizine (zyrTEC) 10 MG tablet, Take 1 tablet by mouth Every 12 (Twelve) Hours., Disp: , Rfl:     escitalopram (LEXAPRO) 5 MG tablet, Take 1 tablet by mouth Daily., Disp: 30 tablet, Rfl: 2    ibuprofen (ADVIL,MOTRIN) 200 MG tablet, Take 2 tablets by mouth Every 6 (Six) Hours As Needed for Mild Pain., Disp: , Rfl:     levETIRAcetam (KEPPRA) 1000 MG tablet, Take 1 tablet by mouth 2 (Two) Times a Day., Disp: 60 tablet, Rfl: 2    pantoprazole (PROTONIX) 40 MG EC tablet, Take 1 tablet by mouth Daily., Disp: 90 tablet, Rfl: 3    rimegepant sulfate ODT (Nurtec) 75 MG disintegrating tablet, Place 1 tablet under the tongue Every Other Day., Disp: 16 tablet, Rfl: 2    rizatriptan (MAXALT) 10 MG tablet, Take 1 tablet by  mouth 1 (One) Time As Needed for Migraine. May repeat in 2 hours if needed, Disp: 12 tablet, Rfl: 5    Medicines reviewed by Lindy Melton RPH on 5/28/2025 at 11:11 AM    Drug Interactions  None of concern at this time     Medications Assessment  No recommended changes at this time.      Relevant Laboratory Values    Common labs          6/6/2024    06:20 6/7/2024    06:13 6/25/2024    11:53   Common Labs   Glucose 148  142  92    BUN 7  8  9    Creatinine 0.71  0.60  0.66    Sodium 139  139  138    Potassium 4.1  4.1  4.6    Chloride 111  111  107    Calcium 8.9  9.1  10.2    Albumin 3.4   4.3    Total Bilirubin 0.3   0.3    Alkaline Phosphatase 115   91    AST (SGOT) 31   17    ALT (SGPT) 77   29    WBC 8.53  13.27  7.17    Hemoglobin 12.7  12.6  14.1    Hematocrit 38.4  37.6  43.7    Platelets 253  274  383        Initial Education Provided for Specialty Medication  The patient has been provided with the following education and any applicable administration techniques (i.e. self-injection) have been demonstrated for the therapies indicated. All questions and concerns have been addressed prior to the patient receiving the medication, and the patient has verbalized understanding of the education and any materials provided.  Additional patient education shall be provided and documented upon request by the patient, provider or payer.      Nurtec (rimegepant) 75 mg ODT, 1 tablet by mouth every other day  Medication Expectations   Why am I taking this medication? You are taking this medication for migraine prophylaxis.   What should I expect while on this medication? You should expect to see a decrease in the frequency and severity of your migraines.   How does the medication work? Nurtec is a small molecule that binds to calcitonin gene-related peptide (CGRP) and blocks its binding to the receptor decreasing the severity of migraines.   How long will I be on this medication for? The amount of time you will be on  this medication will be determined by your doctor and your response to the medication.    How do I take this medication? Take as directed on your prescription label.   What are some possible side effects? Potential side effects including, but not limited to nausea. Patient verbalized understanding.   What happens if I miss a dose? Take the missed dose as soon as possible, and resume the every other day timed from the last dose.     Medication Safety   What are things I should warn my doctor immediately about? Hypersensitivity reactions - trouble breathing or swallowing.   What are things that I should be cautious of? Hypersensitivity reactions (eg, dyspnea, rash), including delayed serious reactions, have occurred; discontinue use if suspected    What are some medications that can interact with this one? Avoid concomitant administration of Nurtec ODT with strong inhibitors of CY, strong or moderate inducers of CYP3A or inhibitors of P-gp or BCRP. Avoid another dose of Nurtec ODT within 48 hours when it is administered with moderate inhibitors of CY. Ask your pharmacist or health care provider before starting new medications     Medication Storage/Handling   How should I handle this medication? Keep this medication out of reach of pets/children in original container. Ensure hands are dry before opening blister pack.   How does this medication need to be stored? Store at room temperature away from heat/cold, sunlight or moisture   How should I dispose of this medication? There should not be a need to dispose of this medication unless your provider decides to change the dose or therapy. If that is the case, take to your local police station for proper disposal. Some pharmacies also have take-back bins for medication drop-off.      Resources/Support   How can I remind myself to take this medication? You can download reminder apps to help you manage your refills. You may also set an alarm on your phone to remind  you. The pharmacy carries pill boxes that you can place next to an area you pass everyday (such as where you place your car keys or where you charge your phone)   Is financial support available?  Yes, [employer] can provide co-pay cards if you have commercial insurance or patient assistance if you have Medicare or no insurance.    Which vaccines are recommended for me? Talk to your doctor about these vaccines: Flu, Coronavirus (COVID-19), Pneumococcal (pneumonia), Tdap, Hepatitis B, Zoster (shingles)           Adherence and Self-Administration  Barriers to Patient Adherence and/or Self-Administration: None    Methods for Supporting Patient Adherence and/or Self-Administration: n/a      Adverse Drug Reactions  Adverse Reactions Experienced: None-new medication  Plan for ADR Management: No ADRs reported during patient encounter     Hospitalizations and Urgent Care Since Last Assessment  Hospitalizations or Admissions: None   ED Visits: None   Urgent Office Visits: None       Quality of Life Assessment   Quality of Life related to the patient's specialty therapy was discussed with the patient. The QOL segment of this outreach has been reviewed and updated.          Goals of Therapy   Goals Addressed Today        Specialty Pharmacy General Goal      5/28/25: Patient [facility] She is starting rimegepant 75 mg ODT every other day in addition to rizatriptan prn. Provided education during call. She is currently getting 4-10 severe migraines per month which responds to the rizatriptan. No new side effects or concerns at this time.                  Reassessment Plan & Follow-Up  Medication Therapy Changes: Start rimegepant 75 mg ODT every other day, and continue rizatriptan prn  Additional Plans, Therapy Recommendations, or Therapy Problems to Be Addressed: Nothing further to be addressed at this time.   Pharmacist to  perform regular reassessments no more than (6) months from the previous assessment.  Welcome information and patient satisfaction survey to be sent by retail team with patient's initial fill.  Care Coordinator to set up future refill outreaches, coordinate prescription delivery, and escalate clinical questions to pharmacist.     Attestation  I attest that the initiated specialty medication(s) are appropriate for the patient based on my assessment.  If the prescribed therapy is at any point deemed not appropriate based on the current or future assessments, a consultation will be initiated with the patient's specialty care provider to determine the best course of action. The revised plan of therapy will be documented along with any additional patient education provided.     Lindy Melton, PharmD  5/28/2025  11:21 EDT

## 2025-05-28 NOTE — TELEPHONE ENCOUNTER
Caller: BETTIE TOMPKINS    Best call back number: [phone]     Who are you requesting to speak with (clinical staff, provider,  specific staff member): SUDHAKAR OR SILVINO    What was the call regarding: THE PT'S MRA OF THE HEAD WO CONTRAST WAS APPROVED BUT DUE TO COST EFFECTIVENESS SHE IS WANTING TO GO TO [facility]. SHE IS SCHEDULED FOR 06/4/25 AND THEY NEED THE ORDERS FAXED TO THEM. [phone]    PLEASE REVIEW  THANK YOU

## 2025-06-10 DIAGNOSIS — Z86.79 HISTORY OF ANEURYSM: ICD-10-CM

## 2025-07-01 ENCOUNTER — SPECIALTY PHARMACY (OUTPATIENT)
Dept: NEUROLOGY | Facility: CLINIC | Age: 40
End: 2025-07-01
Payer: COMMERCIAL

## 2025-07-01 NOTE — PROGRESS NOTES
Specialty Pharmacy Patient Management Program  Refill Outreach     Magdalena was contacted today regarding refills of their medication(s).    Refill Questions      Flowsheet Row Most Recent Value   Changes to allergies? No   Changes to medications? No   New conditions or infections since last clinic visit No   Unplanned office visit, urgent care, ED, or hospital admission in the last 4 weeks  No   How does patient/caregiver feel medication is working? Very good   Financial problems or insurance changes  No   Since the previous refill, were any specialty medication doses or scheduled injections missed or delayed?  No   Does this patient require a clinical escalation to a pharmacist? No            Delivery Questions      Flowsheet Row Most Recent Value   Delivery method [employer]   Delivery address verified with patient/caregiver? Yes   Delivery address Home   Number of medications in delivery 1   Medication(s) being filled and delivered Rimegepant Sulfate (NURTEC-ODT)   Doses left of specialty medications 5   Copay verified? Yes   Copay amount $0   Copay form of payment No copayment ($0)   Delivery Date Selection 07/03/25   Signature Required No   Do you consent to receive electronic handouts?  Yes                 Follow-up: 21 day(s)     Stacy Hernadnez, Pharmacy Technician  7/1/2025  13:40 EDT

## 2025-07-08 ENCOUNTER — TELEMEDICINE (OUTPATIENT)
Dept: PSYCHIATRY | Facility: CLINIC | Age: 40
End: 2025-07-08
Payer: COMMERCIAL

## 2025-07-08 DIAGNOSIS — F41.1 GAD (GENERALIZED ANXIETY DISORDER): ICD-10-CM

## 2025-07-08 DIAGNOSIS — F33.1 MODERATE EPISODE OF RECURRENT MAJOR DEPRESSIVE DISORDER: Primary | ICD-10-CM

## 2025-07-08 DIAGNOSIS — Z79.899 MEDICATION MANAGEMENT: ICD-10-CM

## 2025-07-08 DIAGNOSIS — F32.A FATIGUE DUE TO DEPRESSION: ICD-10-CM

## 2025-07-08 DIAGNOSIS — R53.83 FATIGUE DUE TO DEPRESSION: ICD-10-CM

## 2025-07-08 PROCEDURE — 90792 PSYCH DIAG EVAL W/MED SRVCS: CPT

## 2025-07-08 RX ORDER — ESCITALOPRAM OXALATE 5 MG/1
15 TABLET ORAL DAILY
Qty: 90 TABLET | Refills: 0 | Status: SHIPPED | OUTPATIENT
Start: 2025-07-08

## 2025-07-08 NOTE — PROGRESS NOTES
This provider is located at the [facility] (through [facility]), [address], [facility], [address] using a secure MyChart Video Visit through [employer]. Patient is being seen remotely via telehealth at their home address in [place], and stated they are in a secure environment for this session. The patient's condition being diagnosed/treated is appropriate for telemedicine. The provider identified herself as well as her credentials.   The patient, and/or patients guardian, consent to be seen remotely, and when consent is given they understand that the consent allows for patient identifiable information to be sent to a third party as needed.   They may refuse to be seen remotely at any time. The electronic data is encrypted and password protected, and the patient and/or guardian has been advised of the potential risks to privacy not withstanding such measures.    You have chosen to receive care through a telehealth visit.  Do you consent to use a video/audio connection for your medical care today? Yes        Patient identifiers utilized: Name and date of birth.    Patient verbally confirmed consent for today's encounter:  July 8, 2025    Roxana Carreon is a 40 y.o. female who presents today for initial evaluation     Chief Complaint:  No chief complaint on file.       Referring Provider: Suzie Zimmerman MD    History of Present Illness:    History of Present Illness  Patient is a 40-year-old female presenting for initial psychiatric consultation, referred for agitation, depression, anxiety.  Patient's mental health history began around age 24.  She denies inpatient psychiatric hospitalizations or suicide attempts.  She is currently prescribed Lexapro 5 mg, has been prescribed since May.  Voices compliance with medications and denies side effects.  Has not noticed much efficacy with use.  PHQ score 14 indicates moderate depression patient rates a 7/10 on average.  " When depression is at its worst she cites \"random crying over nothing.\"  Also voices inability to make decisions.  Currently sleeping \"too much, 10 hours.\"  Sleep referrals have previously been discussed to assess for obstructive sleep apnea although patient acknowledges this is not financially feasible currently.  Denies both passive and active SI currently and is convincing.  Does experience moments of \"not wanting to hurt myself but not seeing the point, tired of living, I get really overwhelmed more than anything.  I do not want to hurt myself I am not going to kill myself just feel others might think it is easier if I was not around.\"  Also admits to dark humor \"joking about laying on a railroad track.\"  Denies intention or plan related to this.  He is future oriented and discussing goals and wanting to get back into therapy.  He is able to contract for safety at this time.  MARK score 12 indicates moderate anxiety which patient also rates a 7/10 \"fidgeting, sometimes my chest can get tight.  I have had panic once or twice.\"  None recently.  Currently \"I do not want to see people, it comes in waves.\"  Acknowledges \"poor decisions with money and relationships.\"  Is self-medicating somewhat with marijuana and alcohol, see notes below.  Denies HI, SIB, or hallucinations.  Denies appetite concerns, indicates she has consistently gained weight.  Suffers from fatigue.  Medical comorbidities listed below.  Chart indicates last seizure was in 2023, patient believes it to be longer than that.  She is followed closely by neurology.    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  She has 2 biological children she placed in an open adoption arrangement, no recent communication \"not stable enough to keep in contact.\"  Parents are , residing in [place], has 2 older sisters.  Relationship is good.  Patient born in [place], father in the .  She spent a lot of her life in " "[place] and moved to [place] in .  Is considering moving back to be closer to family.  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  Denies legalities.  Denies Anabaptism preference.  Regarding chemical dependency and substance abuse, currently is drinking approximately 2-3 shots per day of liquor, states alcohol use at its worst was 1/5/day although acknowledges this was years ago.  Denies symptoms currently or previously of DTs or withdrawal.  Longest period of sobriety from marijuana is a few weeks.  States she also has backed off of use related to this.  Does not currently have intention of complete sobriety from either.  Previously in therapy, currently expresses interest.       Last Menstrual Period:  \"Last month\"-tubal ligation    The patient denies any chance of pregnancy at this time.  The patient was educated that her prescribed medications can have potential risk to a developing fetus. The patient is advised to contact this APRN/this office if she becomes pregnant or plans to become pregnant.  Pt verbalizes understanding and acknowledged agreement with this plan in her own words.      The following portions of the patient's history were reviewed and updated as appropriate: allergies, current medications, past family history, past medical history, past social history, past surgical history and problem list.    Past Psychiatric History:  Began Treatment:24  Diagnoses:Depression and Anxiety  Psychiatrist:previously  Therapist:previously  Admission History:Denies  Medication Trials:effexor (quit cold turkey due to availability, efficacy \"I guess so,\" topamax (rash)  Self Harm: Denies  Suicide Attempts:Denies   Psychosis, Anxiety, Depression: PPD with both children    Past Medical History:  Past Medical History:   Diagnosis Date    Abdominal pain     left upper pain    Abnormal Pap smear of cervix     Bronchitis     Cluster " "headache     GERD (gastroesophageal reflux disease)     Headache, tension-type     Hemorrhoids     Lower abdominal pain, unspecified     Migraine     Seizure disorder     last seizure 2 years ago       Substance Abuse History:   Types:alcohol, marijuana use  Withdrawal Symptoms:Denies  Longest Period Sober:\"couple weeks here and there\"  AA: No    Social History:  Social History     Socioeconomic History    Marital status: Single    Number of children: 2   Tobacco Use    Smoking status: Every Day     Current packs/day: 1.00     Average packs/day: 1 pack/day for 26.1 years (26.1 ttl pk-yrs)     Types: Cigarettes     Start date: 6/5/1999   Vaping Use    Vaping status: Never Used   Substance and Sexual Activity    Alcohol use: Yes     Alcohol/week: 10.0 standard drinks of alcohol     Types: 10 Shots of liquor per week     Comment: 2 to 3 per day    Drug use: Yes     Types: Marijuana     Comment: Smoking marijuana shouldn't fall under substance abuse.    Sexual activity: Yes     Partners: Male     Birth control/protection: Tubal ligation       Family History:  Family History   Problem Relation Age of Onset    Colon polyps Mother     Migraines Mother     Stroke Mother     Anxiety disorder Father     Colon polyps Father     Melanoma Father     Diabetes Father     Anxiety disorder Sister     ADD / ADHD Sister     Diabetes Maternal Grandmother     Pancreatic cancer Paternal Grandmother     Diabetes Paternal Grandmother        Past Surgical History:  Past Surgical History:   Procedure Laterality Date    CEREBRAL ANEURYSM REPAIR      CHOLECYSTECTOMY  2020    CLOSED REDUCTION WRIST FRACTURE Left     COLONOSCOPY N/A 9/12/2024    Procedure: COLONOSCOPY;  Surgeon: Brian Abarca MD;  Location[facility];  Service: Gastroenterology;  Laterality: N/A;  polyp, hemorrhoids    D & C WITH SUCTION      ENDOSCOPY N/A 9/12/2024    Procedure: ESOPHAGOGASTRODUODENOSCOPY AND COLONOSCOPY;  Surgeon: Brian Abarca MD;  Location: [facility]" MAIN OR;  Service: Gastroenterology;  Laterality: N/A;    LAPAROSCOPIC TUBAL LIGATION         Problem List:  Patient Active Problem List   Diagnosis    Cellulitis    Migraine    Seizure disorder    Allergy to sulfa drugs    Dermatitis, unspecified, on abdominal wall    Left sided abdominal pain    Nausea and vomiting    Heartburn    Rectal bleeding    Anxiety and depression    History of aneurysm       Allergy:   Allergies   Allergen Reactions    Prednisone Hives    Bactrim [Sulfamethoxazole-Trimethoprim] Rash        Current Medications:   Current Outpatient Medications   Medication Sig Dispense Refill    cetirizine (zyrTEC) 10 MG tablet Take 1 tablet by mouth Every 12 (Twelve) Hours.      escitalopram (LEXAPRO) 5 MG tablet Take 3 tabs by mouth daily. 90 tablet 0    ibuprofen (ADVIL,MOTRIN) 200 MG tablet Take 2 tablets by mouth Every 6 (Six) Hours As Needed for Mild Pain.      levETIRAcetam (KEPPRA) 1000 MG tablet Take 1 tablet by mouth 2 (Two) Times a Day. 60 tablet 2    pantoprazole (PROTONIX) 40 MG EC tablet Take 1 tablet by mouth Daily. 90 tablet 3    rimegepant sulfate ODT (Nurtec) 75 MG disintegrating tablet Place 1 tablet under the tongue Every Other Day. 16 tablet 2    rizatriptan (MAXALT) 10 MG tablet Take 1 tablet by mouth 1 (One) Time As Needed for Migraine. May repeat in 2 hours if needed 12 tablet 5     No current facility-administered medications for this visit.       Review of Systems:    Review of Systems   Constitutional: Negative.    HENT: Negative.     Eyes: Negative.    Respiratory: Negative.     Cardiovascular: Negative.    Gastrointestinal:  Positive for diarrhea and indigestion.   Endocrine: Negative.    Genitourinary: Negative.    Musculoskeletal: Negative.    Skin:  Positive for dry skin.   Allergic/Immunologic: Positive for environmental allergies.   Neurological:  Positive for seizures.   Hematological: Negative.    Psychiatric/Behavioral:  Positive for dysphoric mood, depressed mood and  stress. The patient is nervous/anxious.          Physical Exam:   Physical Exam  Constitutional:       Appearance: Normal appearance.   HENT:      Head: Normocephalic.      Nose: Nose normal.   Pulmonary:      Effort: Pulmonary effort is normal.   Musculoskeletal:         General: Normal range of motion.      Cervical back: Normal range of motion.   Neurological:      General: No focal deficit present.      Mental Status: She is alert. Mental status is at baseline.   Psychiatric:         Attention and Perception: Attention and perception normal.         Mood and Affect: Mood is anxious and depressed. Affect is flat.         Speech: Speech normal.         Behavior: Behavior normal. Behavior is cooperative.         Thought Content: Thought content normal.         Cognition and Memory: Cognition and memory normal.         Judgment: Judgment is impulsive.         Vitals:  not currently breastfeeding. There is no height or weight on file to calculate BMI.  Due to extenuating circumstances and possible current health risks associated with the patient being present in a clinical setting (with current health restrictions in place in regards to possible COVID 19 transmission/exposure), the patient was seen remotely today via a MyChart Video Visit through [employer].  Unable to obtain vital signs due to nature of remote visit.  Height stated at 5ft2.5 inches.  Weight stated at 275 pounds.    Last 3 Blood Pressure Readings:  BP Readings from Last 3 Encounters:   05/23/25 126/88   11/22/24 126/72   10/14/24 133/78       PHQ-9 Score:   PHQ-9 Total Score:  PHQ-9 Depression Screening  Little interest or pleasure in doing things? More than half the days   Feeling down, depressed, or hopeless? More than half the days   PHQ-2 Total Score 4   Trouble falling or staying asleep, or sleeping too much? More than half the days   Feeling tired or having little energy? Nearly every day   Poor appetite or overeating? Not at all   Feeling bad about  yourself - or that you are a failure or have let yourself or your family down? Nearly every day   Trouble concentrating on things, such as reading the newspaper or watching television? More than half the days   Moving or speaking so slowly that other people could have noticed? Or the opposite - being so fidgety or restless that you have been moving around a lot more than usual? Not at all     Thoughts that you would be better off dead, or of hurting yourself in some way? Not at all   PHQ-9 Total Score 14   If you checked off any problems, how difficult have these problems made it for you to do your work, take care of things at home, or get along with other people? Extremely difficult           MARK-7 Score:   Feeling nervous, anxious or on edge: (Patient-Rptd) Nearly every day  Not being able to stop or control worrying: (Patient-Rptd) More than half the days  Worrying too much about different things: (Patient-Rptd) More than half the days  Trouble Relaxing: (Patient-Rptd) Several days  Being so restless that it is hard to sit still: (Patient-Rptd) Not at all  Feeling afraid as if something awful might happen: (Patient-Rptd) More than half the days  Becoming easily annoyed or irritable: (Patient-Rptd) More than half the days  MARK 7 Total Score: (Patient-Rptd) 12  If you checked any problems, how difficult have these problems made it for you to do your work, take care of things at home, or get along with other people: (Patient-Rptd) Somewhat difficult     Mental Status Exam:   Hygiene:   good  Cooperation:  Cooperative  Eye Contact:  Good  Psychomotor Behavior:  Appropriate  Affect:  flat  Mood: sad and anxious  Hopelessness: 9  Speech:  Monotone  Thought Process:  Linear  Thought Content:  Mood congruent  Suicidal:  None  Homicidal:  None  Hallucinations:  None  Delusion:  None  Memory:  Intact  Orientation:  Grossly intact  Reliability:  good  Insight:  Fair  Judgement:  Fair  Impulse Control:   Impaired  Physical/Medical Issues:  seizure d/o last 2023, h/o aneurysm, GINA, migraines, hemorrhoids, kidney stones, obesity       Lab Results:   No visits with results within 6 Month(s) from this visit.   Latest known visit with results is:   Admission on 09/12/2024, Discharged on 09/12/2024   Component Date Value Ref Range Status    HCG, Urine, QL 09/12/2024 Negative  Negative Final    Lot Number 09/12/2024 [id number]   Final    Internal Positive Control 09/12/2024 Passed  Positive, Passed Final    Internal Negative Control 09/12/2024 Passed  Negative, Passed Final    Expiration Date 09/12/2024 5-8-2025   Final    Case Report 09/12/2024    Final                    Value:Surgical Pathology Report                         Case: [id number]                                  Authorizing Provider:  Brian Abarca MD        Collected:           09/12/2024 10:43 AM          Ordering Location:     [facility]     Received:            09/12/2024 11:30 AM                                 [facility] MAIN OR                                                     Pathologist:           Dona Garcia MD                                                          Specimens:   1) - Small Intestine, Duodenum, duodenal bx                                                         2) - Gastric, Antrum, gastric bx                                                                    3) - Large Intestine, Cecum, cecal polyp                                                   Final Diagnosis 09/12/2024    Final                    Value:1.  Small bowel, duodenum, biopsy: Benign small bowel mucosa with                           A. Normal intact villous surface.                           B. No significant inflammation, no granulomas.                           C. No viral inclusions or other organisms on routinely stained sections.                                                    2.  Stomach, biopsy: Antral and oxyntic type gastric  "mucosa with                           A. Mild reactive/chemical gastropathy; no significant inflammation.                           B. No metaplasia, dysplasia nor malignancy.                           C. No H. pylori-like organisms identified.                                                    3.  Colon, cecum, biopsy:                           A.  Fragments of tubular adenoma.    Gross Description 09/12/2024    Final                    Value:1. Small Intestine, Duodenum.                          The specimen is received in formalin labeled with the patient's name and                           further designated 'duodenal biopsy' are multiple small fragments of                           gray-tan tissue. The specimen is submitted for embedding as received.                                                                              2. Gastric, Antrum.                          The specimen is received in formalin labeled with the patient's name and                           further designated 'gastric antral biopsy' are multiple small fragments of                           gray-tan tissue. The specimen is submitted for embedding as received.                                                                              3. Large Intestine, Cecum.                          The specimen is received in formalin labeled with the patient's name and                           further designated 'cecal polyp biopsy' are multiple small fragments of                           gray-tan tissue. The specimen is submitted for embedding as received.                                                        Microscopic Description 09/12/2024    Final                    Value:Unless \"gross only\" is specified, the final diagnosis for each specimen is                           based on microscopic examination of tissue.         Assessment & Plan   Problems Addressed this Visit    None  Visit Diagnoses         Moderate episode of recurrent " major depressive disorder    -  Primary    Relevant Medications    escitalopram (LEXAPRO) 5 MG tablet      MARK (generalized anxiety disorder)        Relevant Medications    escitalopram (LEXAPRO) 5 MG tablet      Fatigue due to depression        Relevant Medications    escitalopram (LEXAPRO) 5 MG tablet      Medication management        Relevant Medications    escitalopram (LEXAPRO) 5 MG tablet          Diagnoses         Codes Comments      Moderate episode of recurrent major depressive disorder    -  Primary ICD-10-CM: F33.1  ICD-9-CM: 296.32       MARK (generalized anxiety disorder)     ICD-10-CM: F41.1  ICD-9-CM: 300.02       Fatigue due to depression     ICD-10-CM: F32.A, R53.83  ICD-9-CM: 311, 780.79       Medication management     ICD-10-CM: Z79.899  ICD-9-CM: V58.69             Visit Diagnoses:    ICD-10-CM ICD-9-CM   1. Moderate episode of recurrent major depressive disorder  F33.1 296.32   2. MARK (generalized anxiety disorder)  F41.1 300.02   3. Fatigue due to depression  F32.A 311    R53.83 780.79   4. Medication management  Z79.899 V58.69       We will increase Lexapro to 10 mg x 7 days, if well-tolerated increased to 15 mg daily thereafter. Patient is educated upon risks versus benefits as well as side effects and when to seek care in an emergency setting.  Patient verbalized understanding. Instructions sent via my chart regarding new medication at this time.  Discussed this could be risky given seizure status also with alcohol and frequent marijuana use.  Patient verbalizes understanding and provides consent for treatment with Lexapro.  Discussed ultimately ideally patient would practice sobriety to lessen risk and ideally give full chance for medications to work.  Discussed should she have a seizure to abruptly stop medication and seek treatment in the emergency room, this is why we will do a slow titration.  Also discussed marijuana and alcohol use could be risky due to possibly inducing delirium,  memory deficits, psychosis in conjunction with psychotropic medications, also the possibility psychotropic medications will not be as beneficial with concurrent use.  Therapy encouraged, patient receptive, resources sent.  Follow up with this provider in 4 to 6 weeks or sooner if needed.    Risks, benefits, alternatives discussed with patient including GI upset, nausea vomiting diarrhea, theoretical decrease of seizure threshold predisposing the patient to a slightly higher seizure risk, headaches, sexual dysfunction, serotonin syndrome, bleeding risk, increased suicidality in patients 24 years and younger, switching to miles/hypomania.  After discussion of these risks and benefits, the patient voiced understanding and agreed to proceed.     Discussed with pt that combining ETOH with prescribed antidepressants can worsen depression in addition to causing drowsiness, nausea, dizziness, impaired motor control, and increasing risk of cardiovascular events. The combination has the potential to be fatal. Therefore, it is highly recommended that the patient avoid ETOH use while on psychotropic medications. This APRN offered pt resources for ETOH treatment and pt declined      GOALS:  Short Term Goals: Patient will be compliant with medication, and patient will have no significant medication related side effects.  Patient will be engaged in psychotherapy as indicated.  Patient will report subjective improvement of symptoms.  Long term goals: To stabilize mood and treat/improve subjective symptoms, the patient will stay out of the hospital, the patient will be at an optimal level of functioning, and the patient will take all medications as prescribed.  The patient/guardian verbalized understanding and agreement with goals that were mutually set.    Discussed practice no show policy, informed patient 3 no shows would results to referral for in-person psychiatry to better assure compliance in addressing mental  health.    Discussed limitations to telehealth, if at any point a higher level of care or closer monitoring would be warranted, referral to in person psychiatry would be placed. Also this provider does not make determinations related to disability status. If at any point in time patient feels they need to obtain disability, a referral to a higher level of care with in person psychiatrist will be made to more appropriately navigate determination of disability, whether short or long term is needed.     TREATMENT PLAN: Continue supportive psychotherapy efforts and medications as indicated.  Pharmacological and Non-Pharmacological treatment options discussed during today's visit. Patient/Guardian acknowledged and verbally consented with current treatment plan and was educated on the importance of compliance with treatment and follow-up appointments.      MEDICATION ISSUES:  Discussed medication options and treatment plan of prescribed medication as well as the risks, benefits, any black box warnings, and side effects including potential falls, possible impaired driving, and metabolic adversities among others. Patient is agreeable to call the office with any worsening of symptoms or onset of side effects, or if any concerns or questions arise.  The contact information for the office is made available to the patient. Patient is agreeable to call 911 or go to the nearest ER should they begin having any SI/HI, or if any urgent concerns arise. No medication side effects or related complaints today.     MEDS ORDERED DURING VISIT:  New Medications Ordered This Visit   Medications    escitalopram (LEXAPRO) 5 MG tablet     Sig: Take 3 tabs by mouth daily.     Dispense:  90 tablet     Refill:  0       Follow Up Appointment:   Return in about 4 weeks (around 8/5/2025) for Recheck.             This document has been electronically signed by ISAAC Bird  July 8, 2025 10:00 EDT    Dictated Utilizing Dragon Dictation: Part  of this note may be an electronic transcription/translation of spoken language to printed text using the Dragon Dictation System.

## 2025-07-08 NOTE — LETTER
July 8, 2025    Magdalenayudi Carreon  [address]  [facility] [address]      Therapy & Counseling Services in the [place] Area:     [facility]  Two [place] Locations  [phone]    [facility]  [address]  [place] [address]    [facility]  [address]  [place] [address]    [facility]  Two [place] Locations  [phone]    [address]  [place] [address]    [address]  [place] [address]    [facility]  [address] [place], [place] [address] [phone]    [facility]  Three Locations  [phone]    [address] [address]    [address] [place] [address]    [address] [place] [address]    [facility]  [phone]  Depending on the provider, the location may be on [facility], [facility], or [facility] in [facility]  Three Locations  [phone]    [address] [place] [address]    [address] [address]    [address] [place] [address]    [facility]  Various Locations in [place] and Surrounding Areas  [phone]    [facility] [place] [facility], [facility]  [address]  [place] [address]  [phone]    [facility]   Three [place] Locations    [address] [place] [address]  [phone]    [address] [place] [address]  [phone]    [address] [place], [place] [address] [phone]    [facility]  [address]  [place], [place] [address] [phone]    [facility]  [address] [place] [address]  [phone]    [facility]  [address] [facility]  [place], [place] [address] [phone]    [facility]  [address] [place], [place] [address] [phone]  [facility]  [address]  [place], [place] [address] [phone]    [facility]  [address]  [place] [address]  [phone]    [facility] [place] [facility]   [employer]ee  [address] [place], [place] [address] [phone]    [facility]  [address]  [place], [place] [address] [phone]    [facility]  [address]  [place], [place] [address] [phone]      *Always check and make sure providers are within your network if you wish to use your insurance*                            Ekaterina Carver, APRN

## 2025-07-11 RX ORDER — PANTOPRAZOLE SODIUM 40 MG/1
40 TABLET, DELAYED RELEASE ORAL DAILY
Qty: 90 TABLET | Refills: 3 | OUTPATIENT
Start: 2025-07-11

## 2025-07-31 DIAGNOSIS — G43.009 MIGRAINE WITHOUT AURA AND WITHOUT STATUS MIGRAINOSUS, NOT INTRACTABLE: ICD-10-CM

## 2025-07-31 RX ORDER — PANTOPRAZOLE SODIUM 40 MG/1
40 TABLET, DELAYED RELEASE ORAL DAILY
Qty: 90 TABLET | Refills: 3 | OUTPATIENT
Start: 2025-07-31

## 2025-07-31 RX ORDER — RIZATRIPTAN BENZOATE 10 MG/1
10 TABLET ORAL ONCE AS NEEDED
Qty: 12 TABLET | Refills: 0 | Status: SHIPPED | OUTPATIENT
Start: 2025-07-31

## 2025-08-06 ENCOUNTER — TELEMEDICINE (OUTPATIENT)
Dept: PSYCHIATRY | Facility: CLINIC | Age: 40
End: 2025-08-06
Payer: COMMERCIAL

## 2025-08-06 DIAGNOSIS — R53.83 FATIGUE DUE TO DEPRESSION: ICD-10-CM

## 2025-08-06 DIAGNOSIS — F33.1 MODERATE EPISODE OF RECURRENT MAJOR DEPRESSIVE DISORDER: Primary | ICD-10-CM

## 2025-08-06 DIAGNOSIS — F32.A FATIGUE DUE TO DEPRESSION: ICD-10-CM

## 2025-08-06 DIAGNOSIS — F41.1 GAD (GENERALIZED ANXIETY DISORDER): ICD-10-CM

## 2025-08-06 DIAGNOSIS — Z79.899 MEDICATION MANAGEMENT: ICD-10-CM

## 2025-08-06 RX ORDER — ESCITALOPRAM OXALATE 20 MG/1
20 TABLET ORAL DAILY
Qty: 30 TABLET | Refills: 0 | Status: SHIPPED | OUTPATIENT
Start: 2025-08-06

## 2025-08-08 ENCOUNTER — APPOINTMENT (OUTPATIENT)
Dept: CT IMAGING | Facility: HOSPITAL | Age: 40
End: 2025-08-08
Payer: COMMERCIAL

## 2025-08-08 ENCOUNTER — HOSPITAL ENCOUNTER (EMERGENCY)
Facility: HOSPITAL | Age: 40
Discharge: HOME | End: 2025-08-09
Attending: EMERGENCY MEDICINE
Payer: COMMERCIAL

## 2025-08-08 VITALS
DIASTOLIC BLOOD PRESSURE: 77 MMHG | TEMPERATURE: 97.5 F | BODY MASS INDEX: 50.09 KG/M2 | RESPIRATION RATE: 18 BRPM | WEIGHT: 272.2 LBS | HEIGHT: 62 IN | HEART RATE: 73 BPM | SYSTOLIC BLOOD PRESSURE: 118 MMHG | OXYGEN SATURATION: 95 %

## 2025-08-08 DIAGNOSIS — G43.909 MIGRAINE WITHOUT STATUS MIGRAINOSUS, NOT INTRACTABLE, UNSPECIFIED MIGRAINE TYPE: Primary | ICD-10-CM

## 2025-08-08 LAB
ALBUMIN SERPL-MCNC: 4.2 G/DL (ref 3.5–5.2)
ALBUMIN/GLOB SERPL: 1.5 G/DL
ALP SERPL-CCNC: 103 U/L (ref 39–117)
ALT SERPL W P-5'-P-CCNC: 42 U/L (ref 1–33)
ANION GAP SERPL CALCULATED.3IONS-SCNC: 10.4 MMOL/L (ref 5–15)
AST SERPL-CCNC: 34 U/L (ref 1–32)
BASOPHILS # BLD AUTO: 0.03 10*3/MM3 (ref 0–0.2)
BASOPHILS NFR BLD AUTO: 0.3 % (ref 0–1.5)
BILIRUB SERPL-MCNC: 0.3 MG/DL (ref 0–1.2)
BUN SERPL-MCNC: 8 MG/DL (ref 6–20)
BUN/CREAT SERPL: 11.6 (ref 7–25)
CALCIUM SPEC-SCNC: 9.5 MG/DL (ref 8.6–10.5)
CHLORIDE SERPL-SCNC: 104 MMOL/L (ref 98–107)
CO2 SERPL-SCNC: 23.6 MMOL/L (ref 22–29)
CREAT SERPL-MCNC: 0.69 MG/DL (ref 0.57–1)
DEPRECATED RDW RBC AUTO: 44.8 FL (ref 37–54)
EGFRCR SERPLBLD CKD-EPI 2021: 111.3 ML/MIN/1.73
EOSINOPHIL # BLD AUTO: 0.13 10*3/MM3 (ref 0–0.4)
EOSINOPHIL NFR BLD AUTO: 1.2 % (ref 0.3–6.2)
ERYTHROCYTE [DISTWIDTH] IN BLOOD BY AUTOMATED COUNT: 12 % (ref 12.3–15.4)
GEN 5 1HR TROPONIN T REFLEX: <6 NG/L
GLOBULIN UR ELPH-MCNC: 2.8 GM/DL
GLUCOSE SERPL-MCNC: 150 MG/DL (ref 65–99)
HCT VFR BLD AUTO: 44.4 % (ref 34–46.6)
HGB BLD-MCNC: 14.7 G/DL (ref 12–15.9)
IMM GRANULOCYTES # BLD AUTO: 0.03 10*3/MM3 (ref 0–0.05)
IMM GRANULOCYTES NFR BLD AUTO: 0.3 % (ref 0–0.5)
LYMPHOCYTES # BLD AUTO: 2.15 10*3/MM3 (ref 0.7–3.1)
LYMPHOCYTES NFR BLD AUTO: 19.4 % (ref 19.6–45.3)
MAGNESIUM SERPL-MCNC: 2.2 MG/DL (ref 1.6–2.6)
MCH RBC QN AUTO: 33.2 PG (ref 26.6–33)
MCHC RBC AUTO-ENTMCNC: 33.1 G/DL (ref 31.5–35.7)
MCV RBC AUTO: 100.2 FL (ref 79–97)
MONOCYTES # BLD AUTO: 0.72 10*3/MM3 (ref 0.1–0.9)
MONOCYTES NFR BLD AUTO: 6.5 % (ref 5–12)
NEUTROPHILS NFR BLD AUTO: 72.3 % (ref 42.7–76)
NEUTROPHILS NFR BLD AUTO: 8.02 10*3/MM3 (ref 1.7–7)
NRBC BLD AUTO-RTO: 0 /100 WBC (ref 0–0.2)
PLATELET # BLD AUTO: 340 10*3/MM3 (ref 140–450)
PMV BLD AUTO: 9.5 FL (ref 6–12)
POTASSIUM SERPL-SCNC: 3.9 MMOL/L (ref 3.5–5.2)
PROT SERPL-MCNC: 7 G/DL (ref 6–8.5)
QT INTERVAL: 397 MS
QTC INTERVAL: 451 MS
RBC # BLD AUTO: 4.43 10*6/MM3 (ref 3.77–5.28)
SODIUM SERPL-SCNC: 138 MMOL/L (ref 136–145)
TROPONIN T NUMERIC DELTA: NORMAL
TROPONIN T SERPL HS-MCNC: <6 NG/L
WBC NRBC COR # BLD AUTO: 11.08 10*3/MM3 (ref 3.4–10.8)

## 2025-08-08 PROCEDURE — 25010000002 KETOROLAC TROMETHAMINE PER 15 MG: Performed by: PHYSICIAN ASSISTANT

## 2025-08-08 PROCEDURE — 36415 COLL VENOUS BLD VENIPUNCTURE: CPT

## 2025-08-08 PROCEDURE — 83735 ASSAY OF MAGNESIUM: CPT | Performed by: PHYSICIAN ASSISTANT

## 2025-08-08 PROCEDURE — 85025 COMPLETE CBC W/AUTO DIFF WBC: CPT | Performed by: PHYSICIAN ASSISTANT

## 2025-08-08 PROCEDURE — 93010 ELECTROCARDIOGRAM REPORT: CPT | Performed by: INTERNAL MEDICINE

## 2025-08-08 PROCEDURE — 25010000002 PROCHLORPERAZINE 10 MG/2ML SOLUTION: Performed by: PHYSICIAN ASSISTANT

## 2025-08-08 PROCEDURE — 25810000003 LACTATED RINGERS SOLUTION: Performed by: PHYSICIAN ASSISTANT

## 2025-08-08 PROCEDURE — 70450 CT HEAD/BRAIN W/O DYE: CPT

## 2025-08-08 PROCEDURE — 96374 THER/PROPH/DIAG INJ IV PUSH: CPT

## 2025-08-08 PROCEDURE — 84484 ASSAY OF TROPONIN QUANT: CPT | Performed by: PHYSICIAN ASSISTANT

## 2025-08-08 PROCEDURE — 80053 COMPREHEN METABOLIC PANEL: CPT | Performed by: PHYSICIAN ASSISTANT

## 2025-08-08 PROCEDURE — 25010000002 DIPHENHYDRAMINE PER 50 MG: Performed by: PHYSICIAN ASSISTANT

## 2025-08-08 PROCEDURE — 99284 EMERGENCY DEPT VISIT MOD MDM: CPT

## 2025-08-08 PROCEDURE — 96361 HYDRATE IV INFUSION ADD-ON: CPT

## 2025-08-08 PROCEDURE — 93005 ELECTROCARDIOGRAM TRACING: CPT | Performed by: PHYSICIAN ASSISTANT

## 2025-08-08 PROCEDURE — 96375 TX/PRO/DX INJ NEW DRUG ADDON: CPT

## 2025-08-08 RX ORDER — DIPHENHYDRAMINE HYDROCHLORIDE 50 MG/ML
25 INJECTION, SOLUTION INTRAMUSCULAR; INTRAVENOUS ONCE
Status: COMPLETED | OUTPATIENT
Start: 2025-08-08 | End: 2025-08-08

## 2025-08-08 RX ORDER — PROCHLORPERAZINE EDISYLATE 5 MG/ML
10 INJECTION INTRAMUSCULAR; INTRAVENOUS ONCE
Status: COMPLETED | OUTPATIENT
Start: 2025-08-08 | End: 2025-08-08

## 2025-08-08 RX ORDER — KETOROLAC TROMETHAMINE 15 MG/ML
15 INJECTION, SOLUTION INTRAMUSCULAR; INTRAVENOUS ONCE
Status: COMPLETED | OUTPATIENT
Start: 2025-08-08 | End: 2025-08-08

## 2025-08-08 RX ADMIN — KETOROLAC TROMETHAMINE 15 MG: 15 INJECTION, SOLUTION INTRAMUSCULAR; INTRAVENOUS at 21:54

## 2025-08-08 RX ADMIN — SODIUM CHLORIDE, POTASSIUM CHLORIDE, SODIUM LACTATE AND CALCIUM CHLORIDE 1000 ML: 600; 310; 30; 20 INJECTION, SOLUTION INTRAVENOUS at 21:51

## 2025-08-08 RX ADMIN — DIPHENHYDRAMINE HYDROCHLORIDE 25 MG: 50 INJECTION, SOLUTION INTRAMUSCULAR; INTRAVENOUS at 21:55

## 2025-08-08 RX ADMIN — PROCHLORPERAZINE EDISYLATE 10 MG: 5 INJECTION INTRAMUSCULAR; INTRAVENOUS at 21:55

## 2025-08-18 ENCOUNTER — SPECIALTY PHARMACY (OUTPATIENT)
Dept: NEUROLOGY | Facility: CLINIC | Age: 40
End: 2025-08-18
Payer: COMMERCIAL

## (undated) DEVICE — MSK ENDO PORT O2 POM ELITE CURAPLEX A/

## (undated) DEVICE — BLCK/BITE BLOX W/DENTL/RIM W/STRAP 54F

## (undated) DEVICE — GOWN ,SIRUS,NONREINFORCED 3XL: Brand: MEDLINE

## (undated) DEVICE — Device

## (undated) DEVICE — SINGLE-USE BIOPSY FORCEPS: Brand: RADIAL JAW 4

## (undated) DEVICE — FLEX ADVANTAGE 1500CC: Brand: FLEX ADVANTAGE

## (undated) DEVICE — VIAL FORMLN CAP 10PCT 20ML

## (undated) DEVICE — GOWN ISOL W/THUMB UNIV BLU BX/15

## (undated) DEVICE — ADAPT CLN SCPE ENDO PORPOISE BX/50 DISP

## (undated) DEVICE — CANN O2 ETCO2 FITS ALL CONN CO2 SMPL A/ 7IN DISP LF

## (undated) DEVICE — KT ORCA ORCAPOD DISP STRL